# Patient Record
Sex: FEMALE | Race: BLACK OR AFRICAN AMERICAN | NOT HISPANIC OR LATINO
[De-identification: names, ages, dates, MRNs, and addresses within clinical notes are randomized per-mention and may not be internally consistent; named-entity substitution may affect disease eponyms.]

---

## 2022-06-20 PROBLEM — Z00.00 ENCOUNTER FOR PREVENTIVE HEALTH EXAMINATION: Status: ACTIVE | Noted: 2022-06-20

## 2022-06-21 PROBLEM — Z00.00 ENCOUNTER FOR PREVENTIVE HEALTH EXAMINATION: Status: ACTIVE | Noted: 2022-06-21

## 2022-07-07 ENCOUNTER — APPOINTMENT (OUTPATIENT)
Dept: VASCULAR SURGERY | Facility: CLINIC | Age: 77
End: 2022-07-07

## 2022-07-07 VITALS — HEART RATE: 84 BPM | DIASTOLIC BLOOD PRESSURE: 74 MMHG | SYSTOLIC BLOOD PRESSURE: 147 MMHG

## 2022-07-07 VITALS — HEART RATE: 84 BPM | DIASTOLIC BLOOD PRESSURE: 74 MMHG | TEMPERATURE: 97.3 F | SYSTOLIC BLOOD PRESSURE: 144 MMHG

## 2022-07-07 PROCEDURE — 99204 OFFICE O/P NEW MOD 45 MIN: CPT

## 2022-07-07 PROCEDURE — 93970 EXTREMITY STUDY: CPT

## 2022-07-15 ENCOUNTER — APPOINTMENT (OUTPATIENT)
Dept: WOUND CARE | Facility: CLINIC | Age: 77
End: 2022-07-15

## 2022-07-15 DIAGNOSIS — I89.0 LYMPHEDEMA, NOT ELSEWHERE CLASSIFIED: ICD-10-CM

## 2022-07-15 DIAGNOSIS — S80.821A BLISTER (NONTHERMAL), RIGHT LOWER LEG, INITIAL ENCOUNTER: ICD-10-CM

## 2022-07-15 DIAGNOSIS — M19.90 UNSPECIFIED OSTEOARTHRITIS, UNSPECIFIED SITE: ICD-10-CM

## 2022-07-15 DIAGNOSIS — Z78.9 OTHER SPECIFIED HEALTH STATUS: ICD-10-CM

## 2022-07-15 PROCEDURE — 99205 OFFICE O/P NEW HI 60 MIN: CPT

## 2022-07-15 RX ORDER — FUROSEMIDE 40 MG/1
40 TABLET ORAL
Refills: 0 | Status: ACTIVE | COMMUNITY

## 2022-07-15 RX ORDER — GABAPENTIN 100 MG/1
100 CAPSULE ORAL
Refills: 0 | Status: ACTIVE | COMMUNITY

## 2022-07-15 RX ORDER — ASPIRIN ENTERIC COATED TABLETS 81 MG 81 MG/1
81 TABLET, DELAYED RELEASE ORAL
Refills: 0 | Status: ACTIVE | COMMUNITY

## 2022-07-15 RX ORDER — BLOOD SUGAR DIAGNOSTIC
STRIP MISCELLANEOUS
Qty: 200 | Refills: 0 | Status: ACTIVE | COMMUNITY
Start: 2022-06-14

## 2022-07-15 RX ORDER — ATORVASTATIN CALCIUM 10 MG/1
10 TABLET, FILM COATED ORAL
Refills: 0 | Status: ACTIVE | COMMUNITY

## 2022-07-15 RX ORDER — AMLODIPINE BESYLATE 5 MG/1
5 TABLET ORAL
Refills: 0 | Status: ACTIVE | COMMUNITY

## 2022-07-15 RX ORDER — IRBESARTAN 300 MG/1
TABLET ORAL
Refills: 0 | Status: ACTIVE | COMMUNITY

## 2022-07-15 RX ORDER — NEBIVOLOL HYDROCHLORIDE 10 MG/1
10 TABLET ORAL
Refills: 0 | Status: ACTIVE | COMMUNITY

## 2022-07-15 RX ORDER — ALBUTEROL SULFATE 90 UG/1
108 (90 BASE) INHALANT RESPIRATORY (INHALATION)
Qty: 7 | Refills: 0 | Status: ACTIVE | COMMUNITY
Start: 2022-01-29

## 2022-07-15 RX ORDER — SEMAGLUTIDE 2.68 MG/ML
8 INJECTION, SOLUTION SUBCUTANEOUS
Qty: 9 | Refills: 0 | Status: ACTIVE | COMMUNITY
Start: 2022-06-14

## 2023-07-26 ENCOUNTER — APPOINTMENT (OUTPATIENT)
Dept: UROGYNECOLOGY | Facility: CLINIC | Age: 78
End: 2023-07-26
Payer: MEDICARE

## 2023-07-26 VITALS
WEIGHT: 238 LBS | BODY MASS INDEX: 40.63 KG/M2 | SYSTOLIC BLOOD PRESSURE: 150 MMHG | DIASTOLIC BLOOD PRESSURE: 75 MMHG | HEIGHT: 64 IN

## 2023-07-26 DIAGNOSIS — Z82.49 FAMILY HISTORY OF ISCHEMIC HEART DISEASE AND OTHER DISEASES OF THE CIRCULATORY SYSTEM: ICD-10-CM

## 2023-07-26 DIAGNOSIS — N39.41 URGE INCONTINENCE: ICD-10-CM

## 2023-07-26 DIAGNOSIS — Z86.79 PERSONAL HISTORY OF OTHER DISEASES OF THE CIRCULATORY SYSTEM: ICD-10-CM

## 2023-07-26 DIAGNOSIS — N36.41 HYPERMOBILITY OF URETHRA: ICD-10-CM

## 2023-07-26 DIAGNOSIS — Z86.39 PERSONAL HISTORY OF OTHER ENDOCRINE, NUTRITIONAL AND METABOLIC DISEASE: ICD-10-CM

## 2023-07-26 DIAGNOSIS — I38 ENDOCARDITIS, VALVE UNSPECIFIED: ICD-10-CM

## 2023-07-26 DIAGNOSIS — I51.9 HEART DISEASE, UNSPECIFIED: ICD-10-CM

## 2023-07-26 DIAGNOSIS — Z87.09 PERSONAL HISTORY OF OTHER DISEASES OF THE RESPIRATORY SYSTEM: ICD-10-CM

## 2023-07-26 DIAGNOSIS — Z82.5 FAMILY HISTORY OF ASTHMA AND OTHER CHRONIC LOWER RESPIRATORY DISEASES: ICD-10-CM

## 2023-07-26 DIAGNOSIS — Z83.3 FAMILY HISTORY OF DIABETES MELLITUS: ICD-10-CM

## 2023-07-26 DIAGNOSIS — Z83.42 FAMILY HISTORY OF FAMILIAL HYPERCHOLESTEROLEMIA: ICD-10-CM

## 2023-07-26 DIAGNOSIS — Z83.49 FAMILY HISTORY OF OTHER ENDOCRINE, NUTRITIONAL AND METABOLIC DISEASES: ICD-10-CM

## 2023-07-26 LAB
BILIRUB UR QL STRIP: NORMAL
CLARITY UR: CLEAR
COLLECTION METHOD: NORMAL
GLUCOSE UR-MCNC: 500
HCG UR QL: 0.2 EU/DL
HGB UR QL STRIP.AUTO: NORMAL
KETONES UR-MCNC: NORMAL
LEUKOCYTE ESTERASE UR QL STRIP: NORMAL
NITRITE UR QL STRIP: NORMAL
PH UR STRIP: 5.5
PROT UR STRIP-MCNC: 100
SP GR UR STRIP: 1.01

## 2023-07-26 PROCEDURE — 81003 URINALYSIS AUTO W/O SCOPE: CPT | Mod: QW

## 2023-07-26 PROCEDURE — 99205 OFFICE O/P NEW HI 60 MIN: CPT | Mod: 25

## 2023-07-26 PROCEDURE — 51701 INSERT BLADDER CATHETER: CPT

## 2023-07-26 NOTE — REASON FOR VISIT
[Initial Visit ___] : an initial visit for [unfilled] [Questionnaire Received] : Patient questionnaire received [Intake Form Reviewed] : Patient intake form with past medical history, surgical history, family history and social history reviewed today [Declined  Services] : Patient was offered free  services in ~his/her~ preferred language and declined services. Patient insisted on family member providing interpretation   [Source: ______] : History obtained from [unfilled]

## 2023-07-27 PROBLEM — Z83.3 FAMILY HISTORY OF DIABETES MELLITUS: Status: ACTIVE | Noted: 2023-07-27

## 2023-07-27 PROBLEM — Z82.49 FAMILY HISTORY OF HYPERTENSION: Status: ACTIVE | Noted: 2023-07-27

## 2023-07-27 PROBLEM — Z86.79 HISTORY OF HYPERTENSION: Status: RESOLVED | Noted: 2022-07-15 | Resolved: 2023-07-27

## 2023-07-27 PROBLEM — Z83.42 FAMILY HISTORY OF HYPERCHOLESTEROLEMIA: Status: ACTIVE | Noted: 2023-07-27

## 2023-07-27 PROBLEM — I38 LEAKY HEART VALVE: Status: RESOLVED | Noted: 2023-07-27 | Resolved: 2023-07-27

## 2023-07-27 PROBLEM — Z82.5 FAMILY HISTORY OF ASTHMA: Status: ACTIVE | Noted: 2023-07-27

## 2023-07-27 PROBLEM — Z87.09 HISTORY OF ASTHMA: Status: RESOLVED | Noted: 2022-07-15 | Resolved: 2023-07-27

## 2023-07-27 PROBLEM — Z86.39 HISTORY OF DIABETES MELLITUS: Status: RESOLVED | Noted: 2022-07-15 | Resolved: 2023-07-27

## 2023-07-27 PROBLEM — Z83.49 FAMILY HISTORY OF OBESITY: Status: ACTIVE | Noted: 2023-07-27

## 2023-07-27 PROBLEM — I51.9 HEART PROBLEM: Status: RESOLVED | Noted: 2023-07-27 | Resolved: 2023-07-27

## 2023-07-27 PROBLEM — Z86.39 HISTORY OF HYPERCHOLESTEROLEMIA: Status: RESOLVED | Noted: 2023-07-27 | Resolved: 2023-07-27

## 2023-07-27 RX ORDER — MONTELUKAST 10 MG/1
10 TABLET, FILM COATED ORAL
Refills: 0 | Status: ACTIVE | COMMUNITY

## 2023-07-27 RX ORDER — DAPAGLIFLOZIN 10 MG/1
10 TABLET, FILM COATED ORAL
Refills: 0 | Status: ACTIVE | COMMUNITY

## 2023-07-27 RX ORDER — INSULIN GLARGINE 100 [IU]/ML
INJECTION, SOLUTION SUBCUTANEOUS
Refills: 0 | Status: ACTIVE | COMMUNITY

## 2023-07-27 RX ORDER — PANTOPRAZOLE 40 MG/1
40 TABLET, DELAYED RELEASE ORAL
Refills: 0 | Status: ACTIVE | COMMUNITY

## 2023-07-27 RX ORDER — FLUTICASONE PROPIONATE AND SALMETEROL 50; 250 UG/1; UG/1
250-50 POWDER RESPIRATORY (INHALATION)
Refills: 0 | Status: ACTIVE | COMMUNITY

## 2023-07-28 NOTE — PROCEDURE
[Good Fit] : fits well [Erosion] : evidence of erosion [Pessary Inserted] : inserted [None] : no bleeding [Refit] : refit is not needed [Erythema] : no erythema [Discharge] : no vaginal discharge [Infection] : no evidence of infection [de-identified] : see above [FreeTextEntry1] : A catheterized urine was performed to rule out urinary tract infection and/or retention

## 2023-07-28 NOTE — COUNSELING
[FreeTextEntry1] : I reviewed the above findings with the patient with visual illustrations. Treatment options for the prolapse were discussed and included doing nothing, Kegel exercises and behavioral modification, a pessary, or surgical correction.  Is interested in surgical correction and not sexually active and is interested in vaginal closure.  We discussed due to the thickened extent of the prolapse today of placing a pessary which she was in agreement with.

## 2023-07-28 NOTE — PHYSICAL EXAM
[Chaperone Present] : A chaperone was present in the examining room during all aspects of the physical examination [No Acute Distress] : in no acute distress [Well developed] : well developed [Well Nourished] : ~L well nourished [Warm and Dry] : was warm and dry to touch [Normal] : normal external genitalia [Vulvar Atrophy] : vulvar atrophy [Normal Appearance] : general appearance was normal [Atrophy] : atrophy [Absent] : absent [Normal rectal exam] : was normal [Rectocele] : a rectocele [Cystocele] : a cystocele [Aa ____] : Aa [unfilled] [Ba ____] : Ba [unfilled] [C ____] : C [unfilled] [GH ____] : GH [unfilled] [Ap ____] : Ap [unfilled] [Bp ____] : Bp [unfilled] [Post Void Residual ____ml] : post void residual was [unfilled] ml [Tenderness] : ~T no ~M abdominal tenderness observed [Distended] : not distended [FreeTextEntry3] : + hypermobility [FreeTextEntry4] : + vaginal vault prolapse, + 2 small ulcerations

## 2023-07-28 NOTE — HISTORY OF PRESENT ILLNESS
[Uterine Prolapse] : no [] : years ago [Unable To Restrain Bowel Movement] : no [Urinary Frequency] : no [Urinary Tract Infection] : no [FreeTextEntry4] : one episode in April 2023 [FreeTextEntry5] : daily  [de-identified] : 2 [de-identified] : wears a diaper at night  [de-identified] : sometimes [de-identified] : sometimes  [de-identified] : voids in diapper  [de-identified] : wears a diapper  [de-identified] : not sexually active [FreeTextEntry1] : pt wears a diapper at night\par pt tried a doughnut in the past

## 2023-07-28 NOTE — DISCUSSION/SUMMARY
[FreeTextEntry1] : Pessary fitted as above.   We discussed that she i is at increased risk for morbidity and possibly mortality with surgical correction due to her medical history.  After further discussion she would like to try a pessary and will continue with it. \par We discussed the possibility of vaginal discharge developing, the pessary falling out, urinary incontinence developing/worsening, and the possibility of vaginal bleeding. I've asked her to call the office if any of the above happens.\par We discussed that urine dipstick showed glucosuria and protein and have asked her to follow-up with her medical doctor for such\par f/u in 2 weeks. \par All questions answered. \par IUGA patient info on pelvic organ prolapse, pessary, and overactive bladder was given to her.

## 2023-08-09 ENCOUNTER — APPOINTMENT (OUTPATIENT)
Dept: UROGYNECOLOGY | Facility: CLINIC | Age: 78
End: 2023-08-09

## 2023-08-16 ENCOUNTER — APPOINTMENT (OUTPATIENT)
Dept: UROGYNECOLOGY | Facility: CLINIC | Age: 78
End: 2023-08-16
Payer: MEDICARE

## 2023-08-16 DIAGNOSIS — N99.3 PROLAPSE OF VAGINAL VAULT AFTER HYSTERECTOMY: ICD-10-CM

## 2023-08-16 PROCEDURE — A4562: CPT

## 2023-08-16 PROCEDURE — 99214 OFFICE O/P EST MOD 30 MIN: CPT

## 2023-08-17 NOTE — HISTORY OF PRESENT ILLNESS
[FreeTextEntry1] : Krystina is a 76 y/o who presents for follow up for pelvic prolapse. She was fitted with a GH LS 3 1/4 at her last visit. Her sister reports that it fell out 3 days later. She would like to be refitted today.

## 2023-08-17 NOTE — PROCEDURE
[Refit] : refit needed [Pessary Inserted] : inserted [None] : no bleeding [Medication Review] : Medicaiton Review: Patient verbalizes understanding of risks and benefits [Bowel Management] : Bowel Management: patient verbalizes understanding of daily dietary fiber intake [Good Fit] : fit is not good [Erosion] : no evidence of erosion [Erythema] : no erythema [Discharge] : no vaginal discharge [Infection] : no evidence of infection [FreeTextEntry1] : GH LS 3 1/4 [FreeTextEntry8] : REviewed pericare

## 2023-08-17 NOTE — DISCUSSION/SUMMARY
[FreeTextEntry1] : Pelvic Prolapse: -refitted with GHG LS 3 1/2 -RTO in 2 weeks or sooner for recheck -Discussed the possibility of urinary incontinence developing/worsening. Patient knows to contact the office with any urinary issues. -Discussed the possibility of the pessary falling out. Importance of avoiding constipation reinforced. -We discussed the normal occurrence of vaginal discharge and minimal vaginal bleeding after placement. Bleeding precautions reviewed. She will contact the office with any questions or concerns. She is aware there is a covering MD overnight and during the weekend. Patient verbalizes understanding.

## 2023-08-17 NOTE — PHYSICAL EXAM
[No Acute Distress] : in no acute distress [Well developed] : well developed [Well Nourished] : ~L well nourished [Good Hygeine] : demonstrates good hygeine [Oriented x3] : oriented to person, place, and time [Normal Memory] : ~T memory was ~L unimpaired [Normal Mood/Affect] : mood and affect are normal [Vulvar Atrophy] : vulvar atrophy [Labia Majora] : were normal [Normal] : was normal [Atrophy] : atrophy [Rectocele] : a rectocele [Cystocele] : a cystocele [Uterine Prolapse] : uterine prolapse [No Bleeding] : there was no active vaginal bleeding [Anxiety] : patient is not anxious [de-identified] : ambulates with walker

## 2023-08-30 ENCOUNTER — APPOINTMENT (OUTPATIENT)
Dept: UROGYNECOLOGY | Facility: CLINIC | Age: 78
End: 2023-08-30
Payer: MEDICARE

## 2023-08-30 DIAGNOSIS — N81.6 RECTOCELE: ICD-10-CM

## 2023-08-30 DIAGNOSIS — N81.11 CYSTOCELE, MIDLINE: ICD-10-CM

## 2023-08-30 PROCEDURE — 99213 OFFICE O/P EST LOW 20 MIN: CPT

## 2023-08-30 NOTE — DISCUSSION/SUMMARY
[FreeTextEntry1] : Pelvic Prolapse: -Pessary left out for pelvic rest -Precautions and instructions reviewed -Advised patient to call the office with any questions or concerns -Will f/u in 3 weeks or sooner

## 2023-08-30 NOTE — PHYSICAL EXAM
[No Acute Distress] : in no acute distress [Well developed] : well developed [Well Nourished] : ~L well nourished [Good Hygeine] : demonstrates good hygeine [Oriented x3] : oriented to person, place, and time [Normal Memory] : ~T memory was ~L unimpaired [Normal Mood/Affect] : mood and affect are normal [Anxiety] : patient is not anxious [Vulvar Atrophy] : vulvar atrophy [Labia Majora] : were normal [Normal] : was normal [Atrophy] : atrophy [Rectocele] : a rectocele [Cystocele] : a cystocele [Uterine Prolapse] : uterine prolapse [No Bleeding] : there was no active vaginal bleeding [de-identified] : ambulates with walker [FreeTextEntry4] :  The pessary has mal rotated and the stem of pessary is facing towards the posterior wall of vagina.  There is no rectovaginal fistula on examination. Indentation on posterior vaginal wall.

## 2023-08-30 NOTE — HISTORY OF PRESENT ILLNESS
[FreeTextEntry1] : Krystina is a 78 y/o who presents for follow up for pelvic prolapse. She was fitted with a GH LS 3 1/2 at her last visit. Prior to that she had a GH LS 3 1/4 that fell out. She denies any vaginal bleeding, pelvic pain or discomfort. She denies any issues with urination or moving her bowels.

## 2023-08-30 NOTE — PROCEDURE
[Good Fit] : fit is not good [Refit] : refit is not needed [Erosion] : no evidence of erosion [Erythema] : erythema noted [Discharge] : no vaginal discharge [Infection] : no evidence of infection [Pessary Out] : removed [None] : no bleeding [Medication Review] : Medicaiton Review: Patient verbalizes understanding of risks and benefits [Bowel Management] : Bowel Management: patient verbalizes understanding of daily dietary fiber intake [FreeTextEntry1] : GH LS 3 1/4 [de-identified] : posterior vaginal wall [FreeTextEntry8] : REviewed pericare

## 2023-11-01 ENCOUNTER — APPOINTMENT (OUTPATIENT)
Dept: UROGYNECOLOGY | Facility: CLINIC | Age: 78
End: 2023-11-01

## 2023-11-23 ENCOUNTER — INPATIENT (INPATIENT)
Facility: HOSPITAL | Age: 78
LOS: 11 days | Discharge: NURSING, REHAB OR LONG-TERM CARE | End: 2023-12-05
Attending: INTERNAL MEDICINE | Admitting: INTERNAL MEDICINE
Payer: MEDICARE

## 2023-11-23 VITALS
HEART RATE: 80 BPM | TEMPERATURE: 97 F | DIASTOLIC BLOOD PRESSURE: 114 MMHG | OXYGEN SATURATION: 97 % | SYSTOLIC BLOOD PRESSURE: 154 MMHG | RESPIRATION RATE: 18 BRPM

## 2023-11-23 LAB
A1C WITH ESTIMATED AVERAGE GLUCOSE RESULT: 8.3 % — HIGH (ref 4–5.6)
A1C WITH ESTIMATED AVERAGE GLUCOSE RESULT: 8.3 % — HIGH (ref 4–5.6)
ALBUMIN SERPL ELPH-MCNC: 4.6 G/DL — SIGNIFICANT CHANGE UP (ref 3.3–5)
ALBUMIN SERPL ELPH-MCNC: 4.6 G/DL — SIGNIFICANT CHANGE UP (ref 3.3–5)
ALP SERPL-CCNC: 137 U/L — HIGH (ref 40–120)
ALP SERPL-CCNC: 137 U/L — HIGH (ref 40–120)
ALT FLD-CCNC: 17 U/L — SIGNIFICANT CHANGE UP (ref 4–33)
ALT FLD-CCNC: 17 U/L — SIGNIFICANT CHANGE UP (ref 4–33)
ANION GAP SERPL CALC-SCNC: 12 MMOL/L — SIGNIFICANT CHANGE UP (ref 7–14)
ANION GAP SERPL CALC-SCNC: 12 MMOL/L — SIGNIFICANT CHANGE UP (ref 7–14)
ANION GAP SERPL CALC-SCNC: 17 MMOL/L — HIGH (ref 7–14)
ANION GAP SERPL CALC-SCNC: 17 MMOL/L — HIGH (ref 7–14)
AST SERPL-CCNC: 26 U/L — SIGNIFICANT CHANGE UP (ref 4–32)
AST SERPL-CCNC: 26 U/L — SIGNIFICANT CHANGE UP (ref 4–32)
B-OH-BUTYR SERPL-SCNC: 0.6 MMOL/L — HIGH (ref 0–0.4)
B-OH-BUTYR SERPL-SCNC: 0.6 MMOL/L — HIGH (ref 0–0.4)
BASE EXCESS BLDV CALC-SCNC: -1 MMOL/L — SIGNIFICANT CHANGE UP (ref -2–3)
BASE EXCESS BLDV CALC-SCNC: -1 MMOL/L — SIGNIFICANT CHANGE UP (ref -2–3)
BASE EXCESS BLDV CALC-SCNC: -3.7 MMOL/L — LOW (ref -2–3)
BASE EXCESS BLDV CALC-SCNC: -3.7 MMOL/L — LOW (ref -2–3)
BASOPHILS # BLD AUTO: 0.01 K/UL — SIGNIFICANT CHANGE UP (ref 0–0.2)
BASOPHILS # BLD AUTO: 0.01 K/UL — SIGNIFICANT CHANGE UP (ref 0–0.2)
BASOPHILS NFR BLD AUTO: 0.1 % — SIGNIFICANT CHANGE UP (ref 0–2)
BASOPHILS NFR BLD AUTO: 0.1 % — SIGNIFICANT CHANGE UP (ref 0–2)
BILIRUB SERPL-MCNC: 0.4 MG/DL — SIGNIFICANT CHANGE UP (ref 0.2–1.2)
BILIRUB SERPL-MCNC: 0.4 MG/DL — SIGNIFICANT CHANGE UP (ref 0.2–1.2)
BLOOD GAS VENOUS COMPREHENSIVE RESULT: SIGNIFICANT CHANGE UP
BUN SERPL-MCNC: 38 MG/DL — HIGH (ref 7–23)
BUN SERPL-MCNC: 38 MG/DL — HIGH (ref 7–23)
BUN SERPL-MCNC: 46 MG/DL — HIGH (ref 7–23)
BUN SERPL-MCNC: 46 MG/DL — HIGH (ref 7–23)
CALCIUM SERPL-MCNC: 10.4 MG/DL — SIGNIFICANT CHANGE UP (ref 8.4–10.5)
CALCIUM SERPL-MCNC: 10.4 MG/DL — SIGNIFICANT CHANGE UP (ref 8.4–10.5)
CALCIUM SERPL-MCNC: 8.1 MG/DL — LOW (ref 8.4–10.5)
CALCIUM SERPL-MCNC: 8.1 MG/DL — LOW (ref 8.4–10.5)
CHLORIDE BLDV-SCNC: 103 MMOL/L — SIGNIFICANT CHANGE UP (ref 96–108)
CHLORIDE BLDV-SCNC: 103 MMOL/L — SIGNIFICANT CHANGE UP (ref 96–108)
CHLORIDE BLDV-SCNC: 99 MMOL/L — SIGNIFICANT CHANGE UP (ref 96–108)
CHLORIDE BLDV-SCNC: 99 MMOL/L — SIGNIFICANT CHANGE UP (ref 96–108)
CHLORIDE SERPL-SCNC: 106 MMOL/L — SIGNIFICANT CHANGE UP (ref 98–107)
CHLORIDE SERPL-SCNC: 106 MMOL/L — SIGNIFICANT CHANGE UP (ref 98–107)
CHLORIDE SERPL-SCNC: 95 MMOL/L — LOW (ref 98–107)
CHLORIDE SERPL-SCNC: 95 MMOL/L — LOW (ref 98–107)
CO2 BLDV-SCNC: 26.1 MMOL/L — HIGH (ref 22–26)
CO2 BLDV-SCNC: 26.1 MMOL/L — HIGH (ref 22–26)
CO2 BLDV-SCNC: 28.5 MMOL/L — HIGH (ref 22–26)
CO2 BLDV-SCNC: 28.5 MMOL/L — HIGH (ref 22–26)
CO2 SERPL-SCNC: 17 MMOL/L — LOW (ref 22–31)
CO2 SERPL-SCNC: 17 MMOL/L — LOW (ref 22–31)
CO2 SERPL-SCNC: 21 MMOL/L — LOW (ref 22–31)
CO2 SERPL-SCNC: 21 MMOL/L — LOW (ref 22–31)
CREAT SERPL-MCNC: 0.9 MG/DL — SIGNIFICANT CHANGE UP (ref 0.5–1.3)
CREAT SERPL-MCNC: 0.9 MG/DL — SIGNIFICANT CHANGE UP (ref 0.5–1.3)
CREAT SERPL-MCNC: 1.18 MG/DL — SIGNIFICANT CHANGE UP (ref 0.5–1.3)
CREAT SERPL-MCNC: 1.18 MG/DL — SIGNIFICANT CHANGE UP (ref 0.5–1.3)
EGFR: 48 ML/MIN/1.73M2 — LOW
EGFR: 48 ML/MIN/1.73M2 — LOW
EGFR: 66 ML/MIN/1.73M2 — SIGNIFICANT CHANGE UP
EGFR: 66 ML/MIN/1.73M2 — SIGNIFICANT CHANGE UP
EOSINOPHIL # BLD AUTO: 0.01 K/UL — SIGNIFICANT CHANGE UP (ref 0–0.5)
EOSINOPHIL # BLD AUTO: 0.01 K/UL — SIGNIFICANT CHANGE UP (ref 0–0.5)
EOSINOPHIL NFR BLD AUTO: 0.1 % — SIGNIFICANT CHANGE UP (ref 0–6)
EOSINOPHIL NFR BLD AUTO: 0.1 % — SIGNIFICANT CHANGE UP (ref 0–6)
ESTIMATED AVERAGE GLUCOSE: 192 — SIGNIFICANT CHANGE UP
ESTIMATED AVERAGE GLUCOSE: 192 — SIGNIFICANT CHANGE UP
GAS PNL BLDV: 125 MMOL/L — LOW (ref 136–145)
GAS PNL BLDV: 125 MMOL/L — LOW (ref 136–145)
GAS PNL BLDV: 132 MMOL/L — LOW (ref 136–145)
GAS PNL BLDV: 132 MMOL/L — LOW (ref 136–145)
GLUCOSE BLDV-MCNC: 318 MG/DL — HIGH (ref 70–99)
GLUCOSE BLDV-MCNC: 318 MG/DL — HIGH (ref 70–99)
GLUCOSE BLDV-MCNC: 447 MG/DL — HIGH (ref 70–99)
GLUCOSE BLDV-MCNC: 447 MG/DL — HIGH (ref 70–99)
GLUCOSE SERPL-MCNC: 280 MG/DL — HIGH (ref 70–99)
GLUCOSE SERPL-MCNC: 280 MG/DL — HIGH (ref 70–99)
GLUCOSE SERPL-MCNC: 392 MG/DL — HIGH (ref 70–99)
GLUCOSE SERPL-MCNC: 392 MG/DL — HIGH (ref 70–99)
HCO3 BLDV-SCNC: 24 MMOL/L — SIGNIFICANT CHANGE UP (ref 22–29)
HCO3 BLDV-SCNC: 24 MMOL/L — SIGNIFICANT CHANGE UP (ref 22–29)
HCO3 BLDV-SCNC: 27 MMOL/L — SIGNIFICANT CHANGE UP (ref 22–29)
HCO3 BLDV-SCNC: 27 MMOL/L — SIGNIFICANT CHANGE UP (ref 22–29)
HCT VFR BLD CALC: 40.2 % — SIGNIFICANT CHANGE UP (ref 34.5–45)
HCT VFR BLD CALC: 40.2 % — SIGNIFICANT CHANGE UP (ref 34.5–45)
HCT VFR BLDA CALC: 38 % — SIGNIFICANT CHANGE UP (ref 34.5–46.5)
HCT VFR BLDA CALC: 38 % — SIGNIFICANT CHANGE UP (ref 34.5–46.5)
HCT VFR BLDA CALC: 41 % — SIGNIFICANT CHANGE UP (ref 34.5–46.5)
HCT VFR BLDA CALC: 41 % — SIGNIFICANT CHANGE UP (ref 34.5–46.5)
HGB BLD CALC-MCNC: 12.7 G/DL — SIGNIFICANT CHANGE UP (ref 11.7–16.1)
HGB BLD CALC-MCNC: 12.7 G/DL — SIGNIFICANT CHANGE UP (ref 11.7–16.1)
HGB BLD CALC-MCNC: 13.6 G/DL — SIGNIFICANT CHANGE UP (ref 11.7–16.1)
HGB BLD CALC-MCNC: 13.6 G/DL — SIGNIFICANT CHANGE UP (ref 11.7–16.1)
HGB BLD-MCNC: 13 G/DL — SIGNIFICANT CHANGE UP (ref 11.5–15.5)
HGB BLD-MCNC: 13 G/DL — SIGNIFICANT CHANGE UP (ref 11.5–15.5)
IANC: 7.06 K/UL — SIGNIFICANT CHANGE UP (ref 1.8–7.4)
IANC: 7.06 K/UL — SIGNIFICANT CHANGE UP (ref 1.8–7.4)
IMM GRANULOCYTES NFR BLD AUTO: 0.4 % — SIGNIFICANT CHANGE UP (ref 0–0.9)
IMM GRANULOCYTES NFR BLD AUTO: 0.4 % — SIGNIFICANT CHANGE UP (ref 0–0.9)
LACTATE BLDV-MCNC: 1.5 MMOL/L — SIGNIFICANT CHANGE UP (ref 0.5–2)
LACTATE BLDV-MCNC: 1.5 MMOL/L — SIGNIFICANT CHANGE UP (ref 0.5–2)
LACTATE BLDV-MCNC: 1.8 MMOL/L — SIGNIFICANT CHANGE UP (ref 0.5–2)
LACTATE BLDV-MCNC: 1.8 MMOL/L — SIGNIFICANT CHANGE UP (ref 0.5–2)
LIDOCAIN IGE QN: 34 U/L — SIGNIFICANT CHANGE UP (ref 7–60)
LIDOCAIN IGE QN: 34 U/L — SIGNIFICANT CHANGE UP (ref 7–60)
LYMPHOCYTES # BLD AUTO: 0.65 K/UL — LOW (ref 1–3.3)
LYMPHOCYTES # BLD AUTO: 0.65 K/UL — LOW (ref 1–3.3)
LYMPHOCYTES # BLD AUTO: 8.1 % — LOW (ref 13–44)
LYMPHOCYTES # BLD AUTO: 8.1 % — LOW (ref 13–44)
MAGNESIUM SERPL-MCNC: 2 MG/DL — SIGNIFICANT CHANGE UP (ref 1.6–2.6)
MAGNESIUM SERPL-MCNC: 2 MG/DL — SIGNIFICANT CHANGE UP (ref 1.6–2.6)
MAGNESIUM SERPL-MCNC: 2.5 MG/DL — SIGNIFICANT CHANGE UP (ref 1.6–2.6)
MAGNESIUM SERPL-MCNC: 2.5 MG/DL — SIGNIFICANT CHANGE UP (ref 1.6–2.6)
MCHC RBC-ENTMCNC: 29.4 PG — SIGNIFICANT CHANGE UP (ref 27–34)
MCHC RBC-ENTMCNC: 29.4 PG — SIGNIFICANT CHANGE UP (ref 27–34)
MCHC RBC-ENTMCNC: 32.3 GM/DL — SIGNIFICANT CHANGE UP (ref 32–36)
MCHC RBC-ENTMCNC: 32.3 GM/DL — SIGNIFICANT CHANGE UP (ref 32–36)
MCV RBC AUTO: 91 FL — SIGNIFICANT CHANGE UP (ref 80–100)
MCV RBC AUTO: 91 FL — SIGNIFICANT CHANGE UP (ref 80–100)
MONOCYTES # BLD AUTO: 0.22 K/UL — SIGNIFICANT CHANGE UP (ref 0–0.9)
MONOCYTES # BLD AUTO: 0.22 K/UL — SIGNIFICANT CHANGE UP (ref 0–0.9)
MONOCYTES NFR BLD AUTO: 2.8 % — SIGNIFICANT CHANGE UP (ref 2–14)
MONOCYTES NFR BLD AUTO: 2.8 % — SIGNIFICANT CHANGE UP (ref 2–14)
NEUTROPHILS # BLD AUTO: 7.06 K/UL — SIGNIFICANT CHANGE UP (ref 1.8–7.4)
NEUTROPHILS # BLD AUTO: 7.06 K/UL — SIGNIFICANT CHANGE UP (ref 1.8–7.4)
NEUTROPHILS NFR BLD AUTO: 88.5 % — HIGH (ref 43–77)
NEUTROPHILS NFR BLD AUTO: 88.5 % — HIGH (ref 43–77)
NRBC # BLD: 0 /100 WBCS — SIGNIFICANT CHANGE UP (ref 0–0)
NRBC # BLD: 0 /100 WBCS — SIGNIFICANT CHANGE UP (ref 0–0)
NRBC # FLD: 0 K/UL — SIGNIFICANT CHANGE UP (ref 0–0)
NRBC # FLD: 0 K/UL — SIGNIFICANT CHANGE UP (ref 0–0)
PCO2 BLDV: 57 MMHG — HIGH (ref 39–52)
PH BLDV: 7.24 — LOW (ref 7.32–7.43)
PH BLDV: 7.24 — LOW (ref 7.32–7.43)
PH BLDV: 7.28 — LOW (ref 7.32–7.43)
PH BLDV: 7.28 — LOW (ref 7.32–7.43)
PHOSPHATE SERPL-MCNC: 3.6 MG/DL — SIGNIFICANT CHANGE UP (ref 2.5–4.5)
PHOSPHATE SERPL-MCNC: 3.6 MG/DL — SIGNIFICANT CHANGE UP (ref 2.5–4.5)
PHOSPHATE SERPL-MCNC: 4.4 MG/DL — SIGNIFICANT CHANGE UP (ref 2.5–4.5)
PHOSPHATE SERPL-MCNC: 4.4 MG/DL — SIGNIFICANT CHANGE UP (ref 2.5–4.5)
PLATELET # BLD AUTO: 237 K/UL — SIGNIFICANT CHANGE UP (ref 150–400)
PLATELET # BLD AUTO: 237 K/UL — SIGNIFICANT CHANGE UP (ref 150–400)
PO2 BLDV: 28 MMHG — SIGNIFICANT CHANGE UP (ref 25–45)
PO2 BLDV: 28 MMHG — SIGNIFICANT CHANGE UP (ref 25–45)
PO2 BLDV: 34 MMHG — SIGNIFICANT CHANGE UP (ref 25–45)
PO2 BLDV: 34 MMHG — SIGNIFICANT CHANGE UP (ref 25–45)
POTASSIUM BLDV-SCNC: 4.9 MMOL/L — SIGNIFICANT CHANGE UP (ref 3.5–5.1)
POTASSIUM BLDV-SCNC: 4.9 MMOL/L — SIGNIFICANT CHANGE UP (ref 3.5–5.1)
POTASSIUM BLDV-SCNC: SIGNIFICANT CHANGE UP MMOL/L (ref 3.5–5.1)
POTASSIUM BLDV-SCNC: SIGNIFICANT CHANGE UP MMOL/L (ref 3.5–5.1)
POTASSIUM SERPL-MCNC: 5 MMOL/L — SIGNIFICANT CHANGE UP (ref 3.5–5.3)
POTASSIUM SERPL-MCNC: 5 MMOL/L — SIGNIFICANT CHANGE UP (ref 3.5–5.3)
POTASSIUM SERPL-MCNC: 5.6 MMOL/L — HIGH (ref 3.5–5.3)
POTASSIUM SERPL-MCNC: 5.6 MMOL/L — HIGH (ref 3.5–5.3)
POTASSIUM SERPL-SCNC: 5 MMOL/L — SIGNIFICANT CHANGE UP (ref 3.5–5.3)
POTASSIUM SERPL-SCNC: 5 MMOL/L — SIGNIFICANT CHANGE UP (ref 3.5–5.3)
POTASSIUM SERPL-SCNC: 5.6 MMOL/L — HIGH (ref 3.5–5.3)
POTASSIUM SERPL-SCNC: 5.6 MMOL/L — HIGH (ref 3.5–5.3)
PROT SERPL-MCNC: 9.2 G/DL — HIGH (ref 6–8.3)
PROT SERPL-MCNC: 9.2 G/DL — HIGH (ref 6–8.3)
RBC # BLD: 4.42 M/UL — SIGNIFICANT CHANGE UP (ref 3.8–5.2)
RBC # BLD: 4.42 M/UL — SIGNIFICANT CHANGE UP (ref 3.8–5.2)
RBC # FLD: 12.1 % — SIGNIFICANT CHANGE UP (ref 10.3–14.5)
RBC # FLD: 12.1 % — SIGNIFICANT CHANGE UP (ref 10.3–14.5)
SAO2 % BLDV: 36.9 % — LOW (ref 67–88)
SAO2 % BLDV: 36.9 % — LOW (ref 67–88)
SAO2 % BLDV: 49.1 % — LOW (ref 67–88)
SAO2 % BLDV: 49.1 % — LOW (ref 67–88)
SODIUM SERPL-SCNC: 133 MMOL/L — LOW (ref 135–145)
SODIUM SERPL-SCNC: 133 MMOL/L — LOW (ref 135–145)
SODIUM SERPL-SCNC: 135 MMOL/L — SIGNIFICANT CHANGE UP (ref 135–145)
SODIUM SERPL-SCNC: 135 MMOL/L — SIGNIFICANT CHANGE UP (ref 135–145)
TROPONIN T, HIGH SENSITIVITY RESULT: 36 NG/L — SIGNIFICANT CHANGE UP
TROPONIN T, HIGH SENSITIVITY RESULT: 36 NG/L — SIGNIFICANT CHANGE UP
TROPONIN T, HIGH SENSITIVITY RESULT: 48 NG/L — SIGNIFICANT CHANGE UP
TROPONIN T, HIGH SENSITIVITY RESULT: 48 NG/L — SIGNIFICANT CHANGE UP
WBC # BLD: 7.98 K/UL — SIGNIFICANT CHANGE UP (ref 3.8–10.5)
WBC # BLD: 7.98 K/UL — SIGNIFICANT CHANGE UP (ref 3.8–10.5)
WBC # FLD AUTO: 7.98 K/UL — SIGNIFICANT CHANGE UP (ref 3.8–10.5)
WBC # FLD AUTO: 7.98 K/UL — SIGNIFICANT CHANGE UP (ref 3.8–10.5)

## 2023-11-23 PROCEDURE — 74177 CT ABD & PELVIS W/CONTRAST: CPT | Mod: 26,MA

## 2023-11-23 PROCEDURE — 76700 US EXAM ABDOM COMPLETE: CPT | Mod: 26

## 2023-11-23 PROCEDURE — 71045 X-RAY EXAM CHEST 1 VIEW: CPT | Mod: 26

## 2023-11-23 PROCEDURE — 99285 EMERGENCY DEPT VISIT HI MDM: CPT

## 2023-11-23 RX ORDER — ONDANSETRON 8 MG/1
4 TABLET, FILM COATED ORAL ONCE
Refills: 0 | Status: COMPLETED | OUTPATIENT
Start: 2023-11-23 | End: 2023-11-23

## 2023-11-23 RX ORDER — FUROSEMIDE 40 MG
10 TABLET ORAL ONCE
Refills: 0 | Status: DISCONTINUED | OUTPATIENT
Start: 2023-11-23 | End: 2023-11-23

## 2023-11-23 RX ORDER — ACETAMINOPHEN 500 MG
1000 TABLET ORAL ONCE
Refills: 0 | Status: COMPLETED | OUTPATIENT
Start: 2023-11-23 | End: 2023-11-23

## 2023-11-23 RX ORDER — SODIUM CHLORIDE 9 MG/ML
1000 INJECTION INTRAMUSCULAR; INTRAVENOUS; SUBCUTANEOUS ONCE
Refills: 0 | Status: COMPLETED | OUTPATIENT
Start: 2023-11-23 | End: 2023-11-23

## 2023-11-23 RX ORDER — INSULIN LISPRO 100/ML
5 VIAL (ML) SUBCUTANEOUS ONCE
Refills: 0 | Status: COMPLETED | OUTPATIENT
Start: 2023-11-23 | End: 2023-11-23

## 2023-11-23 RX ORDER — AMLODIPINE BESYLATE 2.5 MG/1
10 TABLET ORAL ONCE
Refills: 0 | Status: COMPLETED | OUTPATIENT
Start: 2023-11-23 | End: 2023-11-23

## 2023-11-23 RX ORDER — FAMOTIDINE 10 MG/ML
20 INJECTION INTRAVENOUS ONCE
Refills: 0 | Status: COMPLETED | OUTPATIENT
Start: 2023-11-23 | End: 2023-11-23

## 2023-11-23 RX ORDER — FUROSEMIDE 40 MG
20 TABLET ORAL ONCE
Refills: 0 | Status: COMPLETED | OUTPATIENT
Start: 2023-11-23 | End: 2023-11-23

## 2023-11-23 RX ADMIN — Medication 5 UNIT(S): at 17:30

## 2023-11-23 RX ADMIN — SODIUM CHLORIDE 1000 MILLILITER(S): 9 INJECTION INTRAMUSCULAR; INTRAVENOUS; SUBCUTANEOUS at 17:29

## 2023-11-23 RX ADMIN — FAMOTIDINE 20 MILLIGRAM(S): 10 INJECTION INTRAVENOUS at 16:16

## 2023-11-23 RX ADMIN — SODIUM CHLORIDE 1000 MILLILITER(S): 9 INJECTION INTRAMUSCULAR; INTRAVENOUS; SUBCUTANEOUS at 16:16

## 2023-11-23 RX ADMIN — AMLODIPINE BESYLATE 10 MILLIGRAM(S): 2.5 TABLET ORAL at 18:47

## 2023-11-23 RX ADMIN — Medication 20 MILLIGRAM(S): at 18:46

## 2023-11-23 RX ADMIN — Medication 400 MILLIGRAM(S): at 16:18

## 2023-11-23 RX ADMIN — ONDANSETRON 4 MILLIGRAM(S): 8 TABLET, FILM COATED ORAL at 16:14

## 2023-11-23 NOTE — ED ADULT NURSE NOTE - OBJECTIVE STATEMENT
Patient received in room 23. Patient A&Ox3 and ambulatory with walker and assistance at baseline. Patient brought in by EMS c/o abdominal pain and nausea for approximately 1 day. phx DM type 2, HTN, asthma. Patient states she has been experiencing generalized weakness, lethargy, chills, abdominal pain and nausea for approximately 1 day. Additionally, patient states she did not take her night time dose of lantus due to arthritis in her hands. Airway patent, chest rise equal bilaterally, respirations unlabored. Patient denies dyspnea, shortness of breath, and chest pain. Abdomen flat, soft and non-distended; upon palpation, patient endorses tenderness to right upper and lower quadrants. Patient denies changes in BMs/urine. Pulse/motor/sensory present and no edema noted to all four extremities. Skin warm/pink/dry; dark and rough skin noted to bilateral ankles. Labs collected and sent, medications administered as prescribed. Safety measures in place, call bell within reach and family at bedside.

## 2023-11-23 NOTE — ED PROVIDER NOTE - OBJECTIVE STATEMENT
78-year-old female with past medical history of hypertension, hyperlipidemia, diabetes on insulin (patient did not take her Lantus 60 units at bedtime yesterday because she was having difficulty using the injecting device) complaining of a 1 day history of lightheadedness, nausea, vomiting, localized right upper quadrant abdominal pain, and generalized weakness.  Patient is otherwise asymptomatic. Denies fevers, chills, dizziness, chest pain, SOB, dysuria, hematuria.

## 2023-11-23 NOTE — ED PROVIDER NOTE - CLINICAL SUMMARY MEDICAL DECISION MAKING FREE TEXT BOX
78-year-old female with past medical history of hypertension, hyperlipidemia, diabetes on insulin (patient did not take her Lantus 60 units at bedtime yesterday because she was having difficulty using the injecting device) complaining of a 1 day history of lightheadedness, nausea, vomiting, localized right upper quadrant abdominal pain, and generalized weakness.  Patient is otherwise asymptomatic. Denies fevers, chills, dizziness, chest pain, SOB, dysuria, hematuria. RUQ abd TTP, otherwise benign exam. Will screen for DKA, RUQ US for cholecystitis, lipase, and reassess.

## 2023-11-23 NOTE — ED PROVIDER NOTE - ATTENDING CONTRIBUTION TO CARE
78-year-old female past medical history hypertension, hyperlipidemia, diabetes (unsure what type, is on insulin) presents to ED for 1 day of right upper quadrant pain, nausea, vomiting, lightheadedness, fatigue.  Family at bedside note fingersticks at home have been elevated, in ED noted to be 343.  No fever, chills, chest pain, palpitations, shortness of breath, cough.  No diarrhea.  No urinary symptoms reported.    Exam as above    Will assess for acute intra-abdominal infection versus DKA, screen for ACS given hyperglycemia although overall lower suspicion.  Plan: Labs, chest x-ray, right upper quadrant ultrasound, IV fluids, symptom relief as needed, reassess.

## 2023-11-23 NOTE — ED PROVIDER NOTE - CARE PLAN
Principal Discharge DX:	Unspecified abdominal pain   1 Principal Discharge DX:	SBO (small bowel obstruction)

## 2023-11-23 NOTE — ED PROVIDER NOTE - PROGRESS NOTE DETAILS
Rodrigo [place]: Furosemide 10mg, amlodipine 10mg, nebivolol 10mg are the home BP medications. Will administer meds given 200s/100s BP. Walter [place] PGY3: CT showing SBO. evaluated by surgery who will admit.

## 2023-11-23 NOTE — ED ADULT TRIAGE NOTE - CHIEF COMPLAINT QUOTE
Pt. c/o nausea, vomiting, weakness, dizziness and right sided abdominal pain since this morning. Daughter states pt. was hyperglycemic and hypertensive. Hasn't taken her medications since yesterday. Phx: DM2, HTN, asthma

## 2023-11-23 NOTE — ED PROVIDER NOTE - PHYSICAL EXAMINATION
GENERAL: NAD  HEENT:  Atraumatic  CHEST/LUNG: Chest rise equal bilaterally  HEART: Regular rate and rhythm  ABDOMEN: Soft, RUQ abd TTP, Nondistended  EXTREMITIES:  Extremities warm  PSYCH: A&Ox3  SKIN: No obvious rashes or lesions  NEUROLOGY: strength and sensation intact in all extremities GENERAL: NAD  HEENT:  Atraumatic. PERRL, EOMI. MMM  CHEST/LUNG: Chest rise equal bilaterally. lungs clear to ausculation.   HEART: Regular rate and rhythm  ABDOMEN: Soft, mild RUQ abd TTP, Nondistended, no rebound or guarding.   MSK; Extremities warm, FROM. no midline or paraspinal tenderness.   PSYCH: A&Ox3  SKIN: No rash  NEUROLOGY: strength and sensation intact in all extremities

## 2023-11-24 DIAGNOSIS — Z98.891 HISTORY OF UTERINE SCAR FROM PREVIOUS SURGERY: Chronic | ICD-10-CM

## 2023-11-24 DIAGNOSIS — K56.609 UNSPECIFIED INTESTINAL OBSTRUCTION, UNSPECIFIED AS TO PARTIAL VERSUS COMPLETE OBSTRUCTION: ICD-10-CM

## 2023-11-24 DIAGNOSIS — Z90.710 ACQUIRED ABSENCE OF BOTH CERVIX AND UTERUS: Chronic | ICD-10-CM

## 2023-11-24 LAB
ANION GAP SERPL CALC-SCNC: 12 MMOL/L — SIGNIFICANT CHANGE UP (ref 7–14)
ANION GAP SERPL CALC-SCNC: 12 MMOL/L — SIGNIFICANT CHANGE UP (ref 7–14)
BUN SERPL-MCNC: 34 MG/DL — HIGH (ref 7–23)
BUN SERPL-MCNC: 34 MG/DL — HIGH (ref 7–23)
CALCIUM SERPL-MCNC: 9 MG/DL — SIGNIFICANT CHANGE UP (ref 8.4–10.5)
CALCIUM SERPL-MCNC: 9 MG/DL — SIGNIFICANT CHANGE UP (ref 8.4–10.5)
CHLORIDE SERPL-SCNC: 104 MMOL/L — SIGNIFICANT CHANGE UP (ref 98–107)
CHLORIDE SERPL-SCNC: 104 MMOL/L — SIGNIFICANT CHANGE UP (ref 98–107)
CO2 SERPL-SCNC: 25 MMOL/L — SIGNIFICANT CHANGE UP (ref 22–31)
CO2 SERPL-SCNC: 25 MMOL/L — SIGNIFICANT CHANGE UP (ref 22–31)
CREAT SERPL-MCNC: 1.14 MG/DL — SIGNIFICANT CHANGE UP (ref 0.5–1.3)
CREAT SERPL-MCNC: 1.14 MG/DL — SIGNIFICANT CHANGE UP (ref 0.5–1.3)
EGFR: 50 ML/MIN/1.73M2 — LOW
EGFR: 50 ML/MIN/1.73M2 — LOW
GLUCOSE BLDC GLUCOMTR-MCNC: 141 MG/DL — HIGH (ref 70–99)
GLUCOSE BLDC GLUCOMTR-MCNC: 141 MG/DL — HIGH (ref 70–99)
GLUCOSE BLDC GLUCOMTR-MCNC: 142 MG/DL — HIGH (ref 70–99)
GLUCOSE BLDC GLUCOMTR-MCNC: 142 MG/DL — HIGH (ref 70–99)
GLUCOSE BLDC GLUCOMTR-MCNC: 167 MG/DL — HIGH (ref 70–99)
GLUCOSE BLDC GLUCOMTR-MCNC: 167 MG/DL — HIGH (ref 70–99)
GLUCOSE BLDC GLUCOMTR-MCNC: 184 MG/DL — HIGH (ref 70–99)
GLUCOSE BLDC GLUCOMTR-MCNC: 184 MG/DL — HIGH (ref 70–99)
GLUCOSE SERPL-MCNC: 172 MG/DL — HIGH (ref 70–99)
GLUCOSE SERPL-MCNC: 172 MG/DL — HIGH (ref 70–99)
HCT VFR BLD CALC: 35.8 % — SIGNIFICANT CHANGE UP (ref 34.5–45)
HCT VFR BLD CALC: 35.8 % — SIGNIFICANT CHANGE UP (ref 34.5–45)
HCV AB S/CO SERPL IA: 0.09 S/CO — SIGNIFICANT CHANGE UP (ref 0–0.99)
HCV AB S/CO SERPL IA: 0.09 S/CO — SIGNIFICANT CHANGE UP (ref 0–0.99)
HCV AB SERPL-IMP: SIGNIFICANT CHANGE UP
HCV AB SERPL-IMP: SIGNIFICANT CHANGE UP
HGB BLD-MCNC: 11.6 G/DL — SIGNIFICANT CHANGE UP (ref 11.5–15.5)
HGB BLD-MCNC: 11.6 G/DL — SIGNIFICANT CHANGE UP (ref 11.5–15.5)
MAGNESIUM SERPL-MCNC: 2.1 MG/DL — SIGNIFICANT CHANGE UP (ref 1.6–2.6)
MAGNESIUM SERPL-MCNC: 2.1 MG/DL — SIGNIFICANT CHANGE UP (ref 1.6–2.6)
MCHC RBC-ENTMCNC: 30.1 PG — SIGNIFICANT CHANGE UP (ref 27–34)
MCHC RBC-ENTMCNC: 30.1 PG — SIGNIFICANT CHANGE UP (ref 27–34)
MCHC RBC-ENTMCNC: 32.4 GM/DL — SIGNIFICANT CHANGE UP (ref 32–36)
MCHC RBC-ENTMCNC: 32.4 GM/DL — SIGNIFICANT CHANGE UP (ref 32–36)
MCV RBC AUTO: 92.7 FL — SIGNIFICANT CHANGE UP (ref 80–100)
MCV RBC AUTO: 92.7 FL — SIGNIFICANT CHANGE UP (ref 80–100)
NRBC # BLD: 0 /100 WBCS — SIGNIFICANT CHANGE UP (ref 0–0)
NRBC # BLD: 0 /100 WBCS — SIGNIFICANT CHANGE UP (ref 0–0)
NRBC # FLD: 0 K/UL — SIGNIFICANT CHANGE UP (ref 0–0)
NRBC # FLD: 0 K/UL — SIGNIFICANT CHANGE UP (ref 0–0)
PHOSPHATE SERPL-MCNC: 3.3 MG/DL — SIGNIFICANT CHANGE UP (ref 2.5–4.5)
PHOSPHATE SERPL-MCNC: 3.3 MG/DL — SIGNIFICANT CHANGE UP (ref 2.5–4.5)
PLATELET # BLD AUTO: 200 K/UL — SIGNIFICANT CHANGE UP (ref 150–400)
PLATELET # BLD AUTO: 200 K/UL — SIGNIFICANT CHANGE UP (ref 150–400)
POTASSIUM SERPL-MCNC: 4.4 MMOL/L — SIGNIFICANT CHANGE UP (ref 3.5–5.3)
POTASSIUM SERPL-MCNC: 4.4 MMOL/L — SIGNIFICANT CHANGE UP (ref 3.5–5.3)
POTASSIUM SERPL-SCNC: 4.4 MMOL/L — SIGNIFICANT CHANGE UP (ref 3.5–5.3)
POTASSIUM SERPL-SCNC: 4.4 MMOL/L — SIGNIFICANT CHANGE UP (ref 3.5–5.3)
RBC # BLD: 3.86 M/UL — SIGNIFICANT CHANGE UP (ref 3.8–5.2)
RBC # BLD: 3.86 M/UL — SIGNIFICANT CHANGE UP (ref 3.8–5.2)
RBC # FLD: 12.6 % — SIGNIFICANT CHANGE UP (ref 10.3–14.5)
RBC # FLD: 12.6 % — SIGNIFICANT CHANGE UP (ref 10.3–14.5)
SODIUM SERPL-SCNC: 141 MMOL/L — SIGNIFICANT CHANGE UP (ref 135–145)
SODIUM SERPL-SCNC: 141 MMOL/L — SIGNIFICANT CHANGE UP (ref 135–145)
WBC # BLD: 7.05 K/UL — SIGNIFICANT CHANGE UP (ref 3.8–10.5)
WBC # BLD: 7.05 K/UL — SIGNIFICANT CHANGE UP (ref 3.8–10.5)
WBC # FLD AUTO: 7.05 K/UL — SIGNIFICANT CHANGE UP (ref 3.8–10.5)
WBC # FLD AUTO: 7.05 K/UL — SIGNIFICANT CHANGE UP (ref 3.8–10.5)

## 2023-11-24 PROCEDURE — 99232 SBSQ HOSP IP/OBS MODERATE 35: CPT | Mod: GC

## 2023-11-24 PROCEDURE — 71045 X-RAY EXAM CHEST 1 VIEW: CPT | Mod: 26,76

## 2023-11-24 RX ORDER — LOSARTAN POTASSIUM 100 MG/1
100 TABLET, FILM COATED ORAL DAILY
Refills: 0 | Status: DISCONTINUED | OUTPATIENT
Start: 2023-11-24 | End: 2023-11-26

## 2023-11-24 RX ORDER — GABAPENTIN 400 MG/1
100 CAPSULE ORAL
Refills: 0 | DISCHARGE

## 2023-11-24 RX ORDER — ACETAMINOPHEN 500 MG
975 TABLET ORAL EVERY 6 HOURS
Refills: 0 | Status: DISCONTINUED | OUTPATIENT
Start: 2023-11-24 | End: 2023-11-26

## 2023-11-24 RX ORDER — IRBESARTAN 75 MG/1
1 TABLET ORAL
Refills: 0 | DISCHARGE

## 2023-11-24 RX ORDER — DEXTROSE 50 % IN WATER 50 %
25 SYRINGE (ML) INTRAVENOUS ONCE
Refills: 0 | Status: DISCONTINUED | OUTPATIENT
Start: 2023-11-24 | End: 2023-12-05

## 2023-11-24 RX ORDER — ALBUTEROL 90 UG/1
2 AEROSOL, METERED ORAL DAILY
Refills: 0 | Status: DISCONTINUED | OUTPATIENT
Start: 2023-11-24 | End: 2023-12-05

## 2023-11-24 RX ORDER — ATORVASTATIN CALCIUM 80 MG/1
1 TABLET, FILM COATED ORAL
Refills: 0 | DISCHARGE

## 2023-11-24 RX ORDER — MONTELUKAST 4 MG/1
1 TABLET, CHEWABLE ORAL
Refills: 0 | DISCHARGE

## 2023-11-24 RX ORDER — SODIUM CHLORIDE 9 MG/ML
1000 INJECTION, SOLUTION INTRAVENOUS
Refills: 0 | Status: DISCONTINUED | OUTPATIENT
Start: 2023-11-24 | End: 2023-12-05

## 2023-11-24 RX ORDER — PANTOPRAZOLE SODIUM 20 MG/1
1 TABLET, DELAYED RELEASE ORAL
Refills: 0 | DISCHARGE

## 2023-11-24 RX ORDER — DEXTROSE 50 % IN WATER 50 %
12.5 SYRINGE (ML) INTRAVENOUS ONCE
Refills: 0 | Status: DISCONTINUED | OUTPATIENT
Start: 2023-11-24 | End: 2023-12-05

## 2023-11-24 RX ORDER — MONTELUKAST 4 MG/1
10 TABLET, CHEWABLE ORAL DAILY
Refills: 0 | Status: DISCONTINUED | OUTPATIENT
Start: 2023-11-24 | End: 2023-11-26

## 2023-11-24 RX ORDER — INSULIN LISPRO 100/ML
VIAL (ML) SUBCUTANEOUS EVERY 6 HOURS
Refills: 0 | Status: DISCONTINUED | OUTPATIENT
Start: 2023-11-24 | End: 2023-11-27

## 2023-11-24 RX ORDER — DEXTROSE 50 % IN WATER 50 %
15 SYRINGE (ML) INTRAVENOUS ONCE
Refills: 0 | Status: DISCONTINUED | OUTPATIENT
Start: 2023-11-24 | End: 2023-12-05

## 2023-11-24 RX ORDER — AMLODIPINE BESYLATE 2.5 MG/1
1 TABLET ORAL
Refills: 0 | DISCHARGE

## 2023-11-24 RX ORDER — ALBUTEROL 90 UG/1
2 AEROSOL, METERED ORAL
Refills: 0 | DISCHARGE

## 2023-11-24 RX ORDER — AMLODIPINE BESYLATE 2.5 MG/1
5 TABLET ORAL DAILY
Refills: 0 | Status: DISCONTINUED | OUTPATIENT
Start: 2023-11-24 | End: 2023-11-26

## 2023-11-24 RX ORDER — SERTRALINE 25 MG/1
1 TABLET, FILM COATED ORAL
Refills: 0 | DISCHARGE

## 2023-11-24 RX ORDER — HEPARIN SODIUM 5000 [USP'U]/ML
5000 INJECTION INTRAVENOUS; SUBCUTANEOUS EVERY 8 HOURS
Refills: 0 | Status: DISCONTINUED | OUTPATIENT
Start: 2023-11-24 | End: 2023-12-05

## 2023-11-24 RX ORDER — SODIUM CHLORIDE 9 MG/ML
1000 INJECTION, SOLUTION INTRAVENOUS
Refills: 0 | Status: DISCONTINUED | OUTPATIENT
Start: 2023-11-24 | End: 2023-11-25

## 2023-11-24 RX ADMIN — Medication 2: at 06:39

## 2023-11-24 RX ADMIN — HEPARIN SODIUM 5000 UNIT(S): 5000 INJECTION INTRAVENOUS; SUBCUTANEOUS at 06:06

## 2023-11-24 RX ADMIN — HEPARIN SODIUM 5000 UNIT(S): 5000 INJECTION INTRAVENOUS; SUBCUTANEOUS at 23:56

## 2023-11-24 RX ADMIN — ALBUTEROL 2 PUFF(S): 90 AEROSOL, METERED ORAL at 23:39

## 2023-11-24 RX ADMIN — HEPARIN SODIUM 5000 UNIT(S): 5000 INJECTION INTRAVENOUS; SUBCUTANEOUS at 15:12

## 2023-11-24 RX ADMIN — Medication 2: at 12:37

## 2023-11-24 NOTE — H&P ADULT - ATTENDING COMMENTS
I saw and examined the patient and agree with the above note.    K56.50 Small Bowel Obstruction due to adhesions  -NPO/IVF  -Resuscitation and trend labs and urine output   -Pain control via PRN IV meds  -Small bowel follow through  ---After initial CT scan, oral contrast should be administered through the NGT(then clamped for 2 hours) and an abd xray should be obtained after 4-6 hours to evaluate if contrast passes obstruction and is visualized within the  If no passage into the colon at this time, an abd x ray should be ordered 24 hours from when the contrast was administered.  If oral contrast was given at the initial CT scan, obtain xrays at the same intervals without administering more oral contrast.  -Operative indications include prolonged SBO, increasing pain/generalized peritonitis and/or concerning repeat imaging.    I spent 15min reviewing data, images and documentation as well as in care coordination.  Greater than 50% of my time was spent in discussion regarding pre- and post-operative care as well as risk and benefits of operative intervention.    Iron Montelongo MD   Acute and Critical Care Surgery  (Cell: [phone])  Email: [email or web address]    The Acute Care Surgery (B Team) Attending Group Practice:  Dr. Damari Pedersen, Dr. William Sheriff, Dr. Glenn Thomas, Dr. Iron Montelongo and Dr. Linette Knox    Urgent issues - spectra [phone] or [phone]  Nonurgent issues - [phone]  Patient appointments or after hours - [phone] I saw and examined the patient and agree with the above note.    K56.50 Small Bowel Obstruction due to adhesions  -NPO/IVF  -Resuscitation and trend labs and urine output   -Pain control via PRN IV meds  -Small bowel follow through  ---After initial CT scan, oral contrast should be administered through the NGT(then clamped for 2 hours) and an abd xray should be obtained after 4-6 hours to evaluate if contrast passes obstruction and is visualized within the  If no passage into the colon at this time, an abd x ray should be ordered 24 hours from when the contrast was administered.  If oral contrast was given at the initial CT scan, obtain xrays at the same intervals without administering more oral contrast.  -Operative indications include prolonged SBO, increasing pain/generalized peritonitis and/or concerning repeat imaging.    I spent 15min reviewing data, images and documentation as well as in care coordination.  Greater than 50% of my time was spent in discussion regarding pre- and post-operative care as well as risk and benefits of operative intervention.    Iron Montelongo MD   Acute and Critical Care Surgery  (Cell: [phone])  Email: [email or web address]    The Acute Care Surgery (B Team) Attending Group Practice:  Dr. Damari Pedersen, Dr. William Sheriff, Dr. Glenn Thomas, Dr. Iron Montelongo and Dr. Linette Knox    Urgent issues - spectra [phone] or [phone]  Nonurgent issues - [phone]  Patient appointments or after hours - [phone] I saw and examined the patient and agree with the above note.    K56.50 Small Bowel Obstruction due to adhesions  -NPO/IVF  -Resuscitation and trend labs and urine output   -Pain control via PRN IV meds  -Small bowel follow through  ---After initial CT scan, oral contrast should be administered through the NGT(then clamped for 2 hours) and an abd xray should be obtained after 4-6 hours to evaluate if contrast passes obstruction and is visualized within the  If no passage into the colon at this time, an abd x ray should be ordered 24 hours from when the contrast was administered.  If oral contrast was given at the initial CT scan, obtain xrays at the same intervals without administering more oral contrast.  -Operative indications include prolonged SBO, increasing pain/generalized peritonitis and/or concerning repeat imaging.    I spent 15min reviewing data, images and documentation as well as in care coordination.  Greater than 50% of my time was spent in discussion regarding pre- and post-operative care as well as risk and benefits of operative intervention.    Iron Montelongo MD   Acute and Critical Care Surgery  (Cell: [phone])  Email: [email or web address]    The Acute Care Surgery (B Team) Attending Group Practice:  Dr. Damari Pedersen, Dr. William Sheriff, Dr. Glenn Thomas, Dr. Iron Montelongo and Dr. Linette Knox    Urgent issues - spectra [phone] or [phone]  Nonurgent issues - [phone]  Patient appointments or after hours - [phone]

## 2023-11-24 NOTE — ED ADULT NURSE REASSESSMENT NOTE - NS ED NURSE REASSESS COMMENT FT1
Patient returns from ultrasound. Patient awake and resting in stretcher; respirations even and unlabored, no signs/symptoms of acute distress. Patient denies dyspnea, shortness of breath, and chest pain. Patient denies pain and offers no complaints at this time. Patient cleaned and changed; skin intact; patient placed in position of comfort and medications administered per eMAR. Safety measures in place and call bell within reach.
Return from break, patient awake and resting in stretcher; respirations even and unlabored, no signs/symptoms of acute distress. Patient denies dyspnea, shortness of breath, and chest pain. Patient denies pain and offers no complaints at this time. Patient cleaned and changed, safety measures in place and call bell within reach.
report received from MICKY Porter. pt received A&Ox4 c/o N/V. surgery at bedside placing NG tube to L nostril at this time. respirations even and unlabored. VS as noted in flowsheet. brief and linens changed at this time. denies c/p, SOB, weakness, fevers, diarrhea. awaiting bed assignment. safety maintained, call bell within reach

## 2023-11-24 NOTE — PROGRESS NOTE ADULT - SUBJECTIVE AND OBJECTIVE BOX
General Surgery Progress Note    Subjective: Patient resting in bed. Patient denies subjective fever/chills, CP, SOB, n/v, or abdominal pain. +Flatus/-BM. Pain well controlled.    Objective:  Vitals:  T(C): 36.8 (11-24-23 @ 10:00), Max: 37.2 (11-24-23 @ 06:09)  HR: 83 (11-24-23 @ 10:00) (80 - 100)  BP: 166/68 (11-24-23 @ 10:00) (132/83 - 218/75)  RR: 18 (11-24-23 @ 10:00) (18 - 19)  SpO2: 98% (11-24-23 @ 10:00) (96% - 100%)  Wt(kg): --    11-23 @ 07:01  -  11-24 @ 07:00  --------------------------------------------------------  IN:    Lactated Ringers: 400 mL  Total IN: 400 mL    OUT:    IV PiggyBack: 0 mL    Nasogastric/Oral tube (mL): 50 mL    Oral Fluid: 0 mL  Total OUT: 50 mL    Total NET: 350 mL          Physical Exam:  General Appearance: no acute distress, NTND   Chest: airway intact, non-labored breathing  CV: no JVD, palpable pulses b/l  Abdomen: soft, non-tender, non-distended, +BS   Extremities: [facility]       Labs:                        13.0   7.98  )-----------( 237      ( 23 Nov 2023 16:12 )             40.2     11-23    135  |  106  |  38<H>  ----------------------------<  280<H>  5.0   |  17<L>  |  0.90    Ca    8.1<L>      23 Nov 2023 18:54  Phos  3.6     11-23  Mg     2.00     11-23    TPro  9.2<H>  /  Alb  4.6  /  TBili  0.4  /  DBili  x   /  AST  26  /  ALT  17  /  AlkPhos  137<H>  11-23    LIVER FUNCTIONS - ( 23 Nov 2023 16:12 )  Alb: 4.6 g/dL / Pro: 9.2 g/dL / ALK PHOS: 137 U/L / ALT: 17 U/L / AST: 26 U/L / GGT: x             Urinalysis Basic - ( 23 Nov 2023 18:54 )    Color: x / Appearance: x / SG: x / pH: x  Gluc: 280 mg/dL / Ketone: x  / Bili: x / Urobili: x   Blood: x / Protein: x / Nitrite: x   Leuk Esterase: x / RBC: x / WBC x   Sq Epi: x / Non Sq Epi: x / Bacteria: x

## 2023-11-24 NOTE — PROGRESS NOTE ADULT - ASSESSMENT
78F w/ pmh of hypertension, hyperlipidemia, diabetes on insulin, s/p  and AVINASH complaining of 2 days abdominal pain n/v. Pain is RLQ, associated with multiple episodes of large volume emesis. She is not passing flatus, last BM 2 days ago. She denies fevers/chills.    Plan:  - remove NGT, c/w NPO sips and chip  - IVF   - DVT PPX  - F/u med rec  - trend bowel fucntion    B team surgery [id number]

## 2023-11-24 NOTE — H&P ADULT - NSHPPHYSICALEXAM_GEN_ALL_CORE
T(C): 36.7 (11-23-23 @ 20:03), Max: 36.7 (11-23-23 @ 18:12)  HR: 94 (11-23-23 @ 20:03) (80 - 94)  BP: 176/63 (11-23-23 @ 20:03) (154/114 - 218/75)  RR: 18 (11-23-23 @ 20:03) (18 - 18)  SpO2: 100% (11-23-23 @ 20:03) (97% - 100%)  Wt(kg): --    Physical Exam:    General: WN/WD NAD  Neurology: A&Ox3, nonfocal, follows commands  Eyes: PERRLA/ EOMI  Respiratory: CTA B/L  CV: Normal rate regular rhythm  Abdominal: Softly distended, tender RLQ  Extremities: No edema  Skin: No Rashes, Hematoma, Ecchymosis

## 2023-11-24 NOTE — H&P ADULT - ASSESSMENT
78F hx , AVINASH p/w 2 days abdominal pain N/V, concern for SBO    -admit to surgery, Dr. Montelongo  -NPO/IVF  -NGT placed, follow up cxr to confirm placement  -Sister to bring home med list tomorrow  -pain control prn    B surgery  [id number] 78F hx , AVINASH p/w 2 days abdominal pain N/V, concern for SBO    -admit to surgery, Dr. Montelongo  -NPO/IVF  -NGT placed, follow up cxr to confirm placement  -Sister to bring home med list tomorrow  -pain control prn    B surgery  [id number] 78F hx , AVINASH p/w 2 days abdominal pain N/V, concern for SBO    -admit to surgery, Dr. Montelongo  -NPO/IVF  -NGT placed, follow up cxr to confirm placement  -Sister to bring home med list tomorrow  -pain control prn    B surgery  [id number]

## 2023-11-24 NOTE — PATIENT PROFILE ADULT - FALL HARM RISK - HARM RISK INTERVENTIONS

## 2023-11-24 NOTE — H&P ADULT - NSHPLABSRESULTS_GEN_ALL_CORE
13.0   7.98  )-----------( 237      ( 23 Nov 2023 16:12 )             40.2     11-23    135  |  106  |  38<H>  ----------------------------<  280<H>  5.0   |  17<L>  |  0.90    Ca    8.1<L>      23 Nov 2023 18:54  Phos  3.6     11-23  Mg     2.00     11-23    TPro  9.2<H>  /  Alb  4.6  /  TBili  0.4  /  DBili  x   /  AST  26  /  ALT  17  /  AlkPhos  137<H>  11-23    LIVER FUNCTIONS - ( 23 Nov 2023 16:12 )  Alb: 4.6 g/dL / Pro: 9.2 g/dL / ALK PHOS: 137 U/L / ALT: 17 U/L / AST: 26 U/L / GGT: x             Urinalysis Basic - ( 23 Nov 2023 18:54 )    Color: x / Appearance: x / SG: x / pH: x  Gluc: 280 mg/dL / Ketone: x  / Bili: x / Urobili: x   Blood: x / Protein: x / Nitrite: x   Leuk Esterase: x / RBC: x / WBC x   Sq Epi: x / Non Sq Epi: x / Bacteria: x      < from: CT Abdomen and Pelvis w/ IV Cont (11.23.23 @ 20:47) >    FINDINGS:  LOWER CHEST: Clear    LIVER: Within normal limits.  BILE DUCTS: Normal caliber.  GALLBLADDER: Within normal limits.  SPLEEN: Within normal limits.  PANCREAS: Mild atrophy.  ADRENALS: Within normal limits.  KIDNEYS/URETERS: Right renal cysts. Duplicated right collecting system.   Moderate right hydroureteronephrosis. Mild left hydronephrosis..    BLADDER: Bladder is distended with severe prolapse, with approximately   10.2 cm of the bladder located below the level of the pubic symphysis.  REPRODUCTIVE ORGANS: Hysterectomy.    BOWEL: Small fluid in the distal esophagus. Diffusely dilated loops of   small bowel with a transition point in the right lower quadrant (301-82).    Appendix is obscured.  PERITONEUM: Trace perihepatic ascites.  VESSELS: Atherosclerotic changes.  RETROPERITONEUM/LYMPH NODES: No lymphadenopathy.  ABDOMINAL WALL: Fat-containing periumbilical hernia.  BONES: Degenerative changes of the spine.    IMPRESSION:    1. High-grade small bowel obstruction with transition in the right lower   quadrant.  2. Moderate right hydroureteronephrosis and mild left hydronephrosis, may   be secondary to underlying severe bladder prolapse.    < end of copied text >

## 2023-11-24 NOTE — H&P ADULT - HISTORY OF PRESENT ILLNESS
78F w/ pmh of hypertension, hyperlipidemia, diabetes on insulin, s/p  and AVINASH complaining of 2 days abdominal pain n/v. Pain is RLQ, associated with multiple episodes of large volume emesis. She is not passing flatus, last BM 2 days ago. She denies fevers/chills.

## 2023-11-25 LAB
ANION GAP SERPL CALC-SCNC: 12 MMOL/L — SIGNIFICANT CHANGE UP (ref 7–14)
ANION GAP SERPL CALC-SCNC: 12 MMOL/L — SIGNIFICANT CHANGE UP (ref 7–14)
BUN SERPL-MCNC: 30 MG/DL — HIGH (ref 7–23)
BUN SERPL-MCNC: 30 MG/DL — HIGH (ref 7–23)
CALCIUM SERPL-MCNC: 9 MG/DL — SIGNIFICANT CHANGE UP (ref 8.4–10.5)
CALCIUM SERPL-MCNC: 9 MG/DL — SIGNIFICANT CHANGE UP (ref 8.4–10.5)
CHLORIDE SERPL-SCNC: 105 MMOL/L — SIGNIFICANT CHANGE UP (ref 98–107)
CHLORIDE SERPL-SCNC: 105 MMOL/L — SIGNIFICANT CHANGE UP (ref 98–107)
CO2 SERPL-SCNC: 24 MMOL/L — SIGNIFICANT CHANGE UP (ref 22–31)
CO2 SERPL-SCNC: 24 MMOL/L — SIGNIFICANT CHANGE UP (ref 22–31)
CREAT SERPL-MCNC: 1.05 MG/DL — SIGNIFICANT CHANGE UP (ref 0.5–1.3)
CREAT SERPL-MCNC: 1.05 MG/DL — SIGNIFICANT CHANGE UP (ref 0.5–1.3)
EGFR: 55 ML/MIN/1.73M2 — LOW
EGFR: 55 ML/MIN/1.73M2 — LOW
GLUCOSE BLDC GLUCOMTR-MCNC: 125 MG/DL — HIGH (ref 70–99)
GLUCOSE BLDC GLUCOMTR-MCNC: 125 MG/DL — HIGH (ref 70–99)
GLUCOSE BLDC GLUCOMTR-MCNC: 136 MG/DL — HIGH (ref 70–99)
GLUCOSE BLDC GLUCOMTR-MCNC: 136 MG/DL — HIGH (ref 70–99)
GLUCOSE BLDC GLUCOMTR-MCNC: 140 MG/DL — HIGH (ref 70–99)
GLUCOSE BLDC GLUCOMTR-MCNC: 140 MG/DL — HIGH (ref 70–99)
GLUCOSE BLDC GLUCOMTR-MCNC: 145 MG/DL — HIGH (ref 70–99)
GLUCOSE BLDC GLUCOMTR-MCNC: 145 MG/DL — HIGH (ref 70–99)
GLUCOSE SERPL-MCNC: 145 MG/DL — HIGH (ref 70–99)
GLUCOSE SERPL-MCNC: 145 MG/DL — HIGH (ref 70–99)
HCT VFR BLD CALC: 35.9 % — SIGNIFICANT CHANGE UP (ref 34.5–45)
HCT VFR BLD CALC: 35.9 % — SIGNIFICANT CHANGE UP (ref 34.5–45)
HGB BLD-MCNC: 11.6 G/DL — SIGNIFICANT CHANGE UP (ref 11.5–15.5)
HGB BLD-MCNC: 11.6 G/DL — SIGNIFICANT CHANGE UP (ref 11.5–15.5)
MAGNESIUM SERPL-MCNC: 1.9 MG/DL — SIGNIFICANT CHANGE UP (ref 1.6–2.6)
MAGNESIUM SERPL-MCNC: 1.9 MG/DL — SIGNIFICANT CHANGE UP (ref 1.6–2.6)
MCHC RBC-ENTMCNC: 30.2 PG — SIGNIFICANT CHANGE UP (ref 27–34)
MCHC RBC-ENTMCNC: 30.2 PG — SIGNIFICANT CHANGE UP (ref 27–34)
MCHC RBC-ENTMCNC: 32.3 GM/DL — SIGNIFICANT CHANGE UP (ref 32–36)
MCHC RBC-ENTMCNC: 32.3 GM/DL — SIGNIFICANT CHANGE UP (ref 32–36)
MCV RBC AUTO: 93.5 FL — SIGNIFICANT CHANGE UP (ref 80–100)
MCV RBC AUTO: 93.5 FL — SIGNIFICANT CHANGE UP (ref 80–100)
NRBC # BLD: 0 /100 WBCS — SIGNIFICANT CHANGE UP (ref 0–0)
NRBC # BLD: 0 /100 WBCS — SIGNIFICANT CHANGE UP (ref 0–0)
NRBC # FLD: 0 K/UL — SIGNIFICANT CHANGE UP (ref 0–0)
NRBC # FLD: 0 K/UL — SIGNIFICANT CHANGE UP (ref 0–0)
PHOSPHATE SERPL-MCNC: 2.9 MG/DL — SIGNIFICANT CHANGE UP (ref 2.5–4.5)
PHOSPHATE SERPL-MCNC: 2.9 MG/DL — SIGNIFICANT CHANGE UP (ref 2.5–4.5)
PLATELET # BLD AUTO: 192 K/UL — SIGNIFICANT CHANGE UP (ref 150–400)
PLATELET # BLD AUTO: 192 K/UL — SIGNIFICANT CHANGE UP (ref 150–400)
POTASSIUM SERPL-MCNC: 3.7 MMOL/L — SIGNIFICANT CHANGE UP (ref 3.5–5.3)
POTASSIUM SERPL-MCNC: 3.7 MMOL/L — SIGNIFICANT CHANGE UP (ref 3.5–5.3)
POTASSIUM SERPL-SCNC: 3.7 MMOL/L — SIGNIFICANT CHANGE UP (ref 3.5–5.3)
POTASSIUM SERPL-SCNC: 3.7 MMOL/L — SIGNIFICANT CHANGE UP (ref 3.5–5.3)
RBC # BLD: 3.84 M/UL — SIGNIFICANT CHANGE UP (ref 3.8–5.2)
RBC # BLD: 3.84 M/UL — SIGNIFICANT CHANGE UP (ref 3.8–5.2)
RBC # FLD: 12.7 % — SIGNIFICANT CHANGE UP (ref 10.3–14.5)
RBC # FLD: 12.7 % — SIGNIFICANT CHANGE UP (ref 10.3–14.5)
SODIUM SERPL-SCNC: 141 MMOL/L — SIGNIFICANT CHANGE UP (ref 135–145)
SODIUM SERPL-SCNC: 141 MMOL/L — SIGNIFICANT CHANGE UP (ref 135–145)
WBC # BLD: 10.98 K/UL — HIGH (ref 3.8–10.5)
WBC # BLD: 10.98 K/UL — HIGH (ref 3.8–10.5)
WBC # FLD AUTO: 10.98 K/UL — HIGH (ref 3.8–10.5)
WBC # FLD AUTO: 10.98 K/UL — HIGH (ref 3.8–10.5)

## 2023-11-25 PROCEDURE — 99232 SBSQ HOSP IP/OBS MODERATE 35: CPT | Mod: GC

## 2023-11-25 PROCEDURE — 74018 RADEX ABDOMEN 1 VIEW: CPT | Mod: 26

## 2023-11-25 RX ORDER — SODIUM CHLORIDE 9 MG/ML
1000 INJECTION, SOLUTION INTRAVENOUS
Refills: 0 | Status: DISCONTINUED | OUTPATIENT
Start: 2023-11-25 | End: 2023-11-26

## 2023-11-25 RX ORDER — ONDANSETRON 8 MG/1
4 TABLET, FILM COATED ORAL EVERY 6 HOURS
Refills: 0 | Status: DISCONTINUED | OUTPATIENT
Start: 2023-11-25 | End: 2023-11-26

## 2023-11-25 RX ORDER — INSULIN GLARGINE 100 [IU]/ML
30 INJECTION, SOLUTION SUBCUTANEOUS AT BEDTIME
Refills: 0 | Status: DISCONTINUED | OUTPATIENT
Start: 2023-11-25 | End: 2023-11-28

## 2023-11-25 RX ORDER — POLYETHYLENE GLYCOL 3350 17 G/17G
17 POWDER, FOR SOLUTION ORAL DAILY
Refills: 0 | Status: DISCONTINUED | OUTPATIENT
Start: 2023-11-25 | End: 2023-11-26

## 2023-11-25 RX ADMIN — HEPARIN SODIUM 5000 UNIT(S): 5000 INJECTION INTRAVENOUS; SUBCUTANEOUS at 07:03

## 2023-11-25 RX ADMIN — Medication 975 MILLIGRAM(S): at 13:00

## 2023-11-25 RX ADMIN — Medication 975 MILLIGRAM(S): at 17:43

## 2023-11-25 RX ADMIN — MONTELUKAST 10 MILLIGRAM(S): 4 TABLET, CHEWABLE ORAL at 12:24

## 2023-11-25 RX ADMIN — SODIUM CHLORIDE 100 MILLILITER(S): 9 INJECTION, SOLUTION INTRAVENOUS at 20:00

## 2023-11-25 RX ADMIN — Medication 975 MILLIGRAM(S): at 22:30

## 2023-11-25 RX ADMIN — ONDANSETRON 4 MILLIGRAM(S): 8 TABLET, FILM COATED ORAL at 14:51

## 2023-11-25 RX ADMIN — HEPARIN SODIUM 5000 UNIT(S): 5000 INJECTION INTRAVENOUS; SUBCUTANEOUS at 22:41

## 2023-11-25 RX ADMIN — AMLODIPINE BESYLATE 5 MILLIGRAM(S): 2.5 TABLET ORAL at 07:01

## 2023-11-25 RX ADMIN — Medication 975 MILLIGRAM(S): at 02:08

## 2023-11-25 RX ADMIN — INSULIN GLARGINE 30 UNIT(S): 100 INJECTION, SOLUTION SUBCUTANEOUS at 22:31

## 2023-11-25 RX ADMIN — Medication 975 MILLIGRAM(S): at 12:23

## 2023-11-25 RX ADMIN — SODIUM CHLORIDE 100 MILLILITER(S): 9 INJECTION, SOLUTION INTRAVENOUS at 17:39

## 2023-11-25 RX ADMIN — HEPARIN SODIUM 5000 UNIT(S): 5000 INJECTION INTRAVENOUS; SUBCUTANEOUS at 14:52

## 2023-11-25 RX ADMIN — Medication 975 MILLIGRAM(S): at 18:10

## 2023-11-25 RX ADMIN — ALBUTEROL 2 PUFF(S): 90 AEROSOL, METERED ORAL at 10:00

## 2023-11-25 RX ADMIN — POLYETHYLENE GLYCOL 3350 17 GRAM(S): 17 POWDER, FOR SOLUTION ORAL at 14:52

## 2023-11-25 RX ADMIN — LOSARTAN POTASSIUM 100 MILLIGRAM(S): 100 TABLET, FILM COATED ORAL at 07:03

## 2023-11-25 RX ADMIN — Medication 975 MILLIGRAM(S): at 07:01

## 2023-11-25 RX ADMIN — Medication 975 MILLIGRAM(S): at 01:25

## 2023-11-25 NOTE — PROGRESS NOTE ADULT - SUBJECTIVE AND OBJECTIVE BOX
General Surgery Progress Note    Subjective: Patient resting in bed. Patient denies subjective fever/chills, CP, SOB, n/v, or abdominal pain. +Flatus/+BM. Pain well controlled, tolerating sips and chips.     Objective:  Vitals:  T(C): 37 (11-25-23 @ 06:00), Max: 38.1 (11-24-23 @ 17:27)  HR: 84 (11-25-23 @ 06:00) (83 - 93)  BP: 158/73 (11-25-23 @ 06:00) (121/49 - 178/75)  RR: 18 (11-25-23 @ 06:00) (17 - 18)  SpO2: 100% (11-25-23 @ 06:00) (96% - 100%)  Wt(kg): --    11-24 @ 07:01  -  11-25 @ 07:00  --------------------------------------------------------  IN:    Lactated Ringers: 1600 mL    Oral Fluid: 400 mL  Total IN: 2000 mL    OUT:    IV PiggyBack: 0 mL    Nasogastric/Oral tube (mL): 100 mL    Stool (mL): 1 mL    Voided (mL): 300 mL  Total OUT: 401 mL    Total NET: 1599 mL          Physical Exam:  General Appearance: no acute distress, NTND   Chest: airway intact, non-labored breathing  CV: no JVD, palpable pulses b/l  Abdomen: soft, non-tender, non-distended, +BS   Extremities: WWP       Labs:                        11.6   10.98 )-----------( 192      ( 25 Nov 2023 06:35 )             35.9     11-25    141  |  105  |  30<H>  ----------------------------<  145<H>  3.7   |  24  |  1.05    Ca    9.0      25 Nov 2023 06:35  Phos  2.9     11-25  Mg     1.90     11-25    TPro  9.2<H>  /  Alb  4.6  /  TBili  0.4  /  DBili  x   /  AST  26  /  ALT  17  /  AlkPhos  137<H>  11-23    LIVER FUNCTIONS - ( 23 Nov 2023 16:12 )  Alb: 4.6 g/dL / Pro: 9.2 g/dL / ALK PHOS: 137 U/L / ALT: 17 U/L / AST: 26 U/L / GGT: x             Urinalysis Basic - ( 25 Nov 2023 06:35 )    Color: x / Appearance: x / SG: x / pH: x  Gluc: 145 mg/dL / Ketone: x  / Bili: x / Urobili: x   Blood: x / Protein: x / Nitrite: x   Leuk Esterase: x / RBC: x / WBC x   Sq Epi: x / Non Sq Epi: x / Bacteria: x

## 2023-11-25 NOTE — PROGRESS NOTE ADULT - ASSESSMENT
78F w/ pmh of hypertension, hyperlipidemia, diabetes on insulin, s/p  and AVINASH complaining of 2 days abdominal pain n/v. Pain is RLQ, associated with multiple episodes of large volume emesis. She is not passing flatus, last BM 2 days ago. She denies fevers/chills.    Plan:  - CLD (CCD), advance to reg diet for lunch if tolerating  - Home meds restarted  - DVT PPX  - trend bowel fucntion    B team surgery [id number]

## 2023-11-26 LAB
ANION GAP SERPL CALC-SCNC: 12 MMOL/L — SIGNIFICANT CHANGE UP (ref 7–14)
ANION GAP SERPL CALC-SCNC: 12 MMOL/L — SIGNIFICANT CHANGE UP (ref 7–14)
BASOPHILS # BLD AUTO: 0.01 K/UL — SIGNIFICANT CHANGE UP (ref 0–0.2)
BASOPHILS # BLD AUTO: 0.01 K/UL — SIGNIFICANT CHANGE UP (ref 0–0.2)
BASOPHILS NFR BLD AUTO: 0.1 % — SIGNIFICANT CHANGE UP (ref 0–2)
BASOPHILS NFR BLD AUTO: 0.1 % — SIGNIFICANT CHANGE UP (ref 0–2)
BUN SERPL-MCNC: 26 MG/DL — HIGH (ref 7–23)
BUN SERPL-MCNC: 26 MG/DL — HIGH (ref 7–23)
CALCIUM SERPL-MCNC: 8.8 MG/DL — SIGNIFICANT CHANGE UP (ref 8.4–10.5)
CALCIUM SERPL-MCNC: 8.8 MG/DL — SIGNIFICANT CHANGE UP (ref 8.4–10.5)
CHLORIDE SERPL-SCNC: 101 MMOL/L — SIGNIFICANT CHANGE UP (ref 98–107)
CHLORIDE SERPL-SCNC: 101 MMOL/L — SIGNIFICANT CHANGE UP (ref 98–107)
CO2 SERPL-SCNC: 23 MMOL/L — SIGNIFICANT CHANGE UP (ref 22–31)
CO2 SERPL-SCNC: 23 MMOL/L — SIGNIFICANT CHANGE UP (ref 22–31)
CREAT SERPL-MCNC: 0.9 MG/DL — SIGNIFICANT CHANGE UP (ref 0.5–1.3)
CREAT SERPL-MCNC: 0.9 MG/DL — SIGNIFICANT CHANGE UP (ref 0.5–1.3)
EGFR: 66 ML/MIN/1.73M2 — SIGNIFICANT CHANGE UP
EGFR: 66 ML/MIN/1.73M2 — SIGNIFICANT CHANGE UP
EOSINOPHIL # BLD AUTO: 0.12 K/UL — SIGNIFICANT CHANGE UP (ref 0–0.5)
EOSINOPHIL # BLD AUTO: 0.12 K/UL — SIGNIFICANT CHANGE UP (ref 0–0.5)
EOSINOPHIL NFR BLD AUTO: 1.2 % — SIGNIFICANT CHANGE UP (ref 0–6)
EOSINOPHIL NFR BLD AUTO: 1.2 % — SIGNIFICANT CHANGE UP (ref 0–6)
GLUCOSE BLDC GLUCOMTR-MCNC: 100 MG/DL — HIGH (ref 70–99)
GLUCOSE BLDC GLUCOMTR-MCNC: 100 MG/DL — HIGH (ref 70–99)
GLUCOSE BLDC GLUCOMTR-MCNC: 101 MG/DL — HIGH (ref 70–99)
GLUCOSE BLDC GLUCOMTR-MCNC: 101 MG/DL — HIGH (ref 70–99)
GLUCOSE BLDC GLUCOMTR-MCNC: 110 MG/DL — HIGH (ref 70–99)
GLUCOSE BLDC GLUCOMTR-MCNC: 110 MG/DL — HIGH (ref 70–99)
GLUCOSE BLDC GLUCOMTR-MCNC: 127 MG/DL — HIGH (ref 70–99)
GLUCOSE BLDC GLUCOMTR-MCNC: 127 MG/DL — HIGH (ref 70–99)
GLUCOSE BLDC GLUCOMTR-MCNC: 59 MG/DL — LOW (ref 70–99)
GLUCOSE BLDC GLUCOMTR-MCNC: 59 MG/DL — LOW (ref 70–99)
GLUCOSE BLDC GLUCOMTR-MCNC: 62 MG/DL — LOW (ref 70–99)
GLUCOSE BLDC GLUCOMTR-MCNC: 62 MG/DL — LOW (ref 70–99)
GLUCOSE BLDC GLUCOMTR-MCNC: 64 MG/DL — LOW (ref 70–99)
GLUCOSE BLDC GLUCOMTR-MCNC: 64 MG/DL — LOW (ref 70–99)
GLUCOSE SERPL-MCNC: 122 MG/DL — HIGH (ref 70–99)
GLUCOSE SERPL-MCNC: 122 MG/DL — HIGH (ref 70–99)
HCT VFR BLD CALC: 35.9 % — SIGNIFICANT CHANGE UP (ref 34.5–45)
HCT VFR BLD CALC: 35.9 % — SIGNIFICANT CHANGE UP (ref 34.5–45)
HGB BLD-MCNC: 11.3 G/DL — LOW (ref 11.5–15.5)
HGB BLD-MCNC: 11.3 G/DL — LOW (ref 11.5–15.5)
IANC: 8.64 K/UL — HIGH (ref 1.8–7.4)
IANC: 8.64 K/UL — HIGH (ref 1.8–7.4)
IMM GRANULOCYTES NFR BLD AUTO: 0.3 % — SIGNIFICANT CHANGE UP (ref 0–0.9)
IMM GRANULOCYTES NFR BLD AUTO: 0.3 % — SIGNIFICANT CHANGE UP (ref 0–0.9)
LYMPHOCYTES # BLD AUTO: 0.79 K/UL — LOW (ref 1–3.3)
LYMPHOCYTES # BLD AUTO: 0.79 K/UL — LOW (ref 1–3.3)
LYMPHOCYTES # BLD AUTO: 7.9 % — LOW (ref 13–44)
LYMPHOCYTES # BLD AUTO: 7.9 % — LOW (ref 13–44)
MAGNESIUM SERPL-MCNC: 1.9 MG/DL — SIGNIFICANT CHANGE UP (ref 1.6–2.6)
MAGNESIUM SERPL-MCNC: 1.9 MG/DL — SIGNIFICANT CHANGE UP (ref 1.6–2.6)
MCHC RBC-ENTMCNC: 29.7 PG — SIGNIFICANT CHANGE UP (ref 27–34)
MCHC RBC-ENTMCNC: 29.7 PG — SIGNIFICANT CHANGE UP (ref 27–34)
MCHC RBC-ENTMCNC: 31.5 GM/DL — LOW (ref 32–36)
MCHC RBC-ENTMCNC: 31.5 GM/DL — LOW (ref 32–36)
MCV RBC AUTO: 94.5 FL — SIGNIFICANT CHANGE UP (ref 80–100)
MCV RBC AUTO: 94.5 FL — SIGNIFICANT CHANGE UP (ref 80–100)
MONOCYTES # BLD AUTO: 0.38 K/UL — SIGNIFICANT CHANGE UP (ref 0–0.9)
MONOCYTES # BLD AUTO: 0.38 K/UL — SIGNIFICANT CHANGE UP (ref 0–0.9)
MONOCYTES NFR BLD AUTO: 3.8 % — SIGNIFICANT CHANGE UP (ref 2–14)
MONOCYTES NFR BLD AUTO: 3.8 % — SIGNIFICANT CHANGE UP (ref 2–14)
NEUTROPHILS # BLD AUTO: 8.64 K/UL — HIGH (ref 1.8–7.4)
NEUTROPHILS # BLD AUTO: 8.64 K/UL — HIGH (ref 1.8–7.4)
NEUTROPHILS NFR BLD AUTO: 86.7 % — HIGH (ref 43–77)
NEUTROPHILS NFR BLD AUTO: 86.7 % — HIGH (ref 43–77)
NRBC # BLD: 0 /100 WBCS — SIGNIFICANT CHANGE UP (ref 0–0)
NRBC # BLD: 0 /100 WBCS — SIGNIFICANT CHANGE UP (ref 0–0)
NRBC # FLD: 0 K/UL — SIGNIFICANT CHANGE UP (ref 0–0)
NRBC # FLD: 0 K/UL — SIGNIFICANT CHANGE UP (ref 0–0)
PHOSPHATE SERPL-MCNC: 2.7 MG/DL — SIGNIFICANT CHANGE UP (ref 2.5–4.5)
PHOSPHATE SERPL-MCNC: 2.7 MG/DL — SIGNIFICANT CHANGE UP (ref 2.5–4.5)
PLATELET # BLD AUTO: 195 K/UL — SIGNIFICANT CHANGE UP (ref 150–400)
PLATELET # BLD AUTO: 195 K/UL — SIGNIFICANT CHANGE UP (ref 150–400)
POTASSIUM SERPL-MCNC: 4.3 MMOL/L — SIGNIFICANT CHANGE UP (ref 3.5–5.3)
POTASSIUM SERPL-MCNC: 4.3 MMOL/L — SIGNIFICANT CHANGE UP (ref 3.5–5.3)
POTASSIUM SERPL-SCNC: 4.3 MMOL/L — SIGNIFICANT CHANGE UP (ref 3.5–5.3)
POTASSIUM SERPL-SCNC: 4.3 MMOL/L — SIGNIFICANT CHANGE UP (ref 3.5–5.3)
RBC # BLD: 3.8 M/UL — SIGNIFICANT CHANGE UP (ref 3.8–5.2)
RBC # BLD: 3.8 M/UL — SIGNIFICANT CHANGE UP (ref 3.8–5.2)
RBC # FLD: 12.6 % — SIGNIFICANT CHANGE UP (ref 10.3–14.5)
RBC # FLD: 12.6 % — SIGNIFICANT CHANGE UP (ref 10.3–14.5)
SODIUM SERPL-SCNC: 136 MMOL/L — SIGNIFICANT CHANGE UP (ref 135–145)
SODIUM SERPL-SCNC: 136 MMOL/L — SIGNIFICANT CHANGE UP (ref 135–145)
WBC # BLD: 9.97 K/UL — SIGNIFICANT CHANGE UP (ref 3.8–10.5)
WBC # BLD: 9.97 K/UL — SIGNIFICANT CHANGE UP (ref 3.8–10.5)
WBC # FLD AUTO: 9.97 K/UL — SIGNIFICANT CHANGE UP (ref 3.8–10.5)
WBC # FLD AUTO: 9.97 K/UL — SIGNIFICANT CHANGE UP (ref 3.8–10.5)

## 2023-11-26 PROCEDURE — 99232 SBSQ HOSP IP/OBS MODERATE 35: CPT | Mod: GC

## 2023-11-26 PROCEDURE — 74018 RADEX ABDOMEN 1 VIEW: CPT | Mod: 26

## 2023-11-26 RX ORDER — ACETAMINOPHEN 500 MG
1000 TABLET ORAL ONCE
Refills: 0 | Status: COMPLETED | OUTPATIENT
Start: 2023-11-26 | End: 2023-11-26

## 2023-11-26 RX ORDER — DEXTROSE MONOHYDRATE, SODIUM CHLORIDE, AND POTASSIUM CHLORIDE 50; .745; 4.5 G/1000ML; G/1000ML; G/1000ML
1000 INJECTION, SOLUTION INTRAVENOUS
Refills: 0 | Status: DISCONTINUED | OUTPATIENT
Start: 2023-11-26 | End: 2023-11-29

## 2023-11-26 RX ORDER — ONDANSETRON 8 MG/1
4 TABLET, FILM COATED ORAL ONCE
Refills: 0 | Status: COMPLETED | OUTPATIENT
Start: 2023-11-26 | End: 2023-11-28

## 2023-11-26 RX ADMIN — Medication 1.25 MILLIGRAM(S): at 12:34

## 2023-11-26 RX ADMIN — INSULIN GLARGINE 30 UNIT(S): 100 INJECTION, SOLUTION SUBCUTANEOUS at 22:34

## 2023-11-26 RX ADMIN — HEPARIN SODIUM 5000 UNIT(S): 5000 INJECTION INTRAVENOUS; SUBCUTANEOUS at 14:13

## 2023-11-26 RX ADMIN — ALBUTEROL 2 PUFF(S): 90 AEROSOL, METERED ORAL at 12:34

## 2023-11-26 RX ADMIN — Medication 1000 MILLIGRAM(S): at 15:35

## 2023-11-26 RX ADMIN — LOSARTAN POTASSIUM 100 MILLIGRAM(S): 100 TABLET, FILM COATED ORAL at 07:06

## 2023-11-26 RX ADMIN — AMLODIPINE BESYLATE 5 MILLIGRAM(S): 2.5 TABLET ORAL at 07:06

## 2023-11-26 RX ADMIN — HEPARIN SODIUM 5000 UNIT(S): 5000 INJECTION INTRAVENOUS; SUBCUTANEOUS at 22:31

## 2023-11-26 RX ADMIN — Medication 1.25 MILLIGRAM(S): at 18:08

## 2023-11-26 RX ADMIN — DEXTROSE MONOHYDRATE, SODIUM CHLORIDE, AND POTASSIUM CHLORIDE 100 MILLILITER(S): 50; .745; 4.5 INJECTION, SOLUTION INTRAVENOUS at 09:46

## 2023-11-26 RX ADMIN — Medication 400 MILLIGRAM(S): at 15:02

## 2023-11-26 RX ADMIN — Medication 975 MILLIGRAM(S): at 07:05

## 2023-11-26 RX ADMIN — HEPARIN SODIUM 5000 UNIT(S): 5000 INJECTION INTRAVENOUS; SUBCUTANEOUS at 07:05

## 2023-11-26 NOTE — PROGRESS NOTE ADULT - ASSESSMENT
78F w/ pmh of hypertension, hyperlipidemia, diabetes on insulin, s/p  and AVINASH complaining of 2 days abdominal pain n/v. Pain is RLQ, associated with multiple episodes of large volume emesis. She is not passing flatus, last BM 2 days ago. She denies fevers/chills. Given resumed bowel function NGT was removed yesterday, however today she reports abdominal pain and one episode of emesis. AXR noted increased gas pattern and physical exam concerning for possible bowel obstruction.     Plan:  - NPO/IVF/NGT  - IV Meds  - DVT PPX  - Trend bowel function  - Abdominal exams before pain meds    B team surgery [id number] 78F w/ pmh of hypertension, hyperlipidemia, diabetes on insulin, s/p  and AVINASH complaining of 2 days abdominal pain n/v. Pain is RLQ, associated with multiple episodes of large volume emesis. She is not passing flatus, last BM 2 days ago. She denies fevers/chills. Given resumed bowel function NGT was removed yesterday, however today she reports abdominal pain and one episode of emesis. AXR noted increased gas pattern and physical exam concerning for possible bowel obstruction.     Plan:  - NPO/IVF (containing dextrose)/NGT  - Transition of meds to IV  - DVT PPX  - Trend bowel function  - Abdominal exams before pain meds    B team surgery [id number] 78F w/ pmh of hypertension, hyperlipidemia, diabetes on insulin, s/p  and AVINASH complaining of 2 days abdominal pain n/v. Pain is RLQ, associated with multiple episodes of large volume emesis. She is not passing flatus, last BM 2 days ago. She denies fevers/chills. Given resumed bowel function NGT was removed yesterday, however today she reports abdominal pain and one episode of emesis. AXR noted increased gas pattern and physical exam concerning for possible bowel obstruction.     Plan:  - NPO/IVF (containing dextrose)/NGT  - Transition of meds to IV  - DVT PPX  - Trend bowel function  - Abdominal exams before pain meds  - Patient can get gum and sips of water to favor peristalsis    B team surgery [id number]

## 2023-11-26 NOTE — PROGRESS NOTE ADULT - ATTENDING COMMENTS
clinically unresolved partial SBO, presumed adhesive in origin  recommendations as above  may need to consider operative intervention if fails to completely resolve in next 24-48hrs

## 2023-11-27 LAB
ANION GAP SERPL CALC-SCNC: 8 MMOL/L — SIGNIFICANT CHANGE UP (ref 7–14)
ANION GAP SERPL CALC-SCNC: 8 MMOL/L — SIGNIFICANT CHANGE UP (ref 7–14)
BUN SERPL-MCNC: 18 MG/DL — SIGNIFICANT CHANGE UP (ref 7–23)
BUN SERPL-MCNC: 18 MG/DL — SIGNIFICANT CHANGE UP (ref 7–23)
CALCIUM SERPL-MCNC: 8.3 MG/DL — LOW (ref 8.4–10.5)
CALCIUM SERPL-MCNC: 8.3 MG/DL — LOW (ref 8.4–10.5)
CHLORIDE SERPL-SCNC: 104 MMOL/L — SIGNIFICANT CHANGE UP (ref 98–107)
CHLORIDE SERPL-SCNC: 104 MMOL/L — SIGNIFICANT CHANGE UP (ref 98–107)
CO2 SERPL-SCNC: 24 MMOL/L — SIGNIFICANT CHANGE UP (ref 22–31)
CO2 SERPL-SCNC: 24 MMOL/L — SIGNIFICANT CHANGE UP (ref 22–31)
CREAT SERPL-MCNC: 0.83 MG/DL — SIGNIFICANT CHANGE UP (ref 0.5–1.3)
CREAT SERPL-MCNC: 0.83 MG/DL — SIGNIFICANT CHANGE UP (ref 0.5–1.3)
EGFR: 73 ML/MIN/1.73M2 — SIGNIFICANT CHANGE UP
EGFR: 73 ML/MIN/1.73M2 — SIGNIFICANT CHANGE UP
GLUCOSE BLDC GLUCOMTR-MCNC: 108 MG/DL — HIGH (ref 70–99)
GLUCOSE BLDC GLUCOMTR-MCNC: 108 MG/DL — HIGH (ref 70–99)
GLUCOSE BLDC GLUCOMTR-MCNC: 117 MG/DL — HIGH (ref 70–99)
GLUCOSE BLDC GLUCOMTR-MCNC: 117 MG/DL — HIGH (ref 70–99)
GLUCOSE BLDC GLUCOMTR-MCNC: 140 MG/DL — HIGH (ref 70–99)
GLUCOSE BLDC GLUCOMTR-MCNC: 140 MG/DL — HIGH (ref 70–99)
GLUCOSE BLDC GLUCOMTR-MCNC: 167 MG/DL — HIGH (ref 70–99)
GLUCOSE BLDC GLUCOMTR-MCNC: 167 MG/DL — HIGH (ref 70–99)
GLUCOSE SERPL-MCNC: 109 MG/DL — HIGH (ref 70–99)
GLUCOSE SERPL-MCNC: 109 MG/DL — HIGH (ref 70–99)
HCT VFR BLD CALC: 31.6 % — LOW (ref 34.5–45)
HCT VFR BLD CALC: 31.6 % — LOW (ref 34.5–45)
HGB BLD-MCNC: 10.2 G/DL — LOW (ref 11.5–15.5)
HGB BLD-MCNC: 10.2 G/DL — LOW (ref 11.5–15.5)
MAGNESIUM SERPL-MCNC: 1.8 MG/DL — SIGNIFICANT CHANGE UP (ref 1.6–2.6)
MAGNESIUM SERPL-MCNC: 1.8 MG/DL — SIGNIFICANT CHANGE UP (ref 1.6–2.6)
MCHC RBC-ENTMCNC: 30.1 PG — SIGNIFICANT CHANGE UP (ref 27–34)
MCHC RBC-ENTMCNC: 30.1 PG — SIGNIFICANT CHANGE UP (ref 27–34)
MCHC RBC-ENTMCNC: 32.3 GM/DL — SIGNIFICANT CHANGE UP (ref 32–36)
MCHC RBC-ENTMCNC: 32.3 GM/DL — SIGNIFICANT CHANGE UP (ref 32–36)
MCV RBC AUTO: 93.2 FL — SIGNIFICANT CHANGE UP (ref 80–100)
MCV RBC AUTO: 93.2 FL — SIGNIFICANT CHANGE UP (ref 80–100)
NRBC # BLD: 0 /100 WBCS — SIGNIFICANT CHANGE UP (ref 0–0)
NRBC # BLD: 0 /100 WBCS — SIGNIFICANT CHANGE UP (ref 0–0)
NRBC # FLD: 0 K/UL — SIGNIFICANT CHANGE UP (ref 0–0)
NRBC # FLD: 0 K/UL — SIGNIFICANT CHANGE UP (ref 0–0)
PHOSPHATE SERPL-MCNC: 2.2 MG/DL — LOW (ref 2.5–4.5)
PHOSPHATE SERPL-MCNC: 2.2 MG/DL — LOW (ref 2.5–4.5)
PLATELET # BLD AUTO: 183 K/UL — SIGNIFICANT CHANGE UP (ref 150–400)
PLATELET # BLD AUTO: 183 K/UL — SIGNIFICANT CHANGE UP (ref 150–400)
POTASSIUM SERPL-MCNC: 4.1 MMOL/L — SIGNIFICANT CHANGE UP (ref 3.5–5.3)
POTASSIUM SERPL-MCNC: 4.1 MMOL/L — SIGNIFICANT CHANGE UP (ref 3.5–5.3)
POTASSIUM SERPL-SCNC: 4.1 MMOL/L — SIGNIFICANT CHANGE UP (ref 3.5–5.3)
POTASSIUM SERPL-SCNC: 4.1 MMOL/L — SIGNIFICANT CHANGE UP (ref 3.5–5.3)
RBC # BLD: 3.39 M/UL — LOW (ref 3.8–5.2)
RBC # BLD: 3.39 M/UL — LOW (ref 3.8–5.2)
RBC # FLD: 12.5 % — SIGNIFICANT CHANGE UP (ref 10.3–14.5)
RBC # FLD: 12.5 % — SIGNIFICANT CHANGE UP (ref 10.3–14.5)
SODIUM SERPL-SCNC: 136 MMOL/L — SIGNIFICANT CHANGE UP (ref 135–145)
SODIUM SERPL-SCNC: 136 MMOL/L — SIGNIFICANT CHANGE UP (ref 135–145)
WBC # BLD: 7.65 K/UL — SIGNIFICANT CHANGE UP (ref 3.8–10.5)
WBC # BLD: 7.65 K/UL — SIGNIFICANT CHANGE UP (ref 3.8–10.5)
WBC # FLD AUTO: 7.65 K/UL — SIGNIFICANT CHANGE UP (ref 3.8–10.5)
WBC # FLD AUTO: 7.65 K/UL — SIGNIFICANT CHANGE UP (ref 3.8–10.5)

## 2023-11-27 PROCEDURE — 99232 SBSQ HOSP IP/OBS MODERATE 35: CPT | Mod: GC

## 2023-11-27 PROCEDURE — 74018 RADEX ABDOMEN 1 VIEW: CPT | Mod: 26

## 2023-11-27 RX ORDER — PANTOPRAZOLE SODIUM 20 MG/1
40 TABLET, DELAYED RELEASE ORAL
Refills: 0 | Status: DISCONTINUED | OUTPATIENT
Start: 2023-11-27 | End: 2023-12-05

## 2023-11-27 RX ORDER — GABAPENTIN 400 MG/1
100 CAPSULE ORAL THREE TIMES A DAY
Refills: 0 | Status: DISCONTINUED | OUTPATIENT
Start: 2023-11-27 | End: 2023-12-05

## 2023-11-27 RX ORDER — DIATRIZOATE MEGLUMINE 180 MG/ML
120 INJECTION, SOLUTION INTRAVESICAL ONCE
Refills: 0 | Status: DISCONTINUED | OUTPATIENT
Start: 2023-11-27 | End: 2023-11-27

## 2023-11-27 RX ORDER — AMLODIPINE BESYLATE 2.5 MG/1
5 TABLET ORAL DAILY
Refills: 0 | Status: DISCONTINUED | OUTPATIENT
Start: 2023-11-27 | End: 2023-12-04

## 2023-11-27 RX ORDER — LOSARTAN POTASSIUM 100 MG/1
100 TABLET, FILM COATED ORAL DAILY
Refills: 0 | Status: DISCONTINUED | OUTPATIENT
Start: 2023-11-27 | End: 2023-12-04

## 2023-11-27 RX ORDER — LABETALOL HCL 100 MG
5 TABLET ORAL ONCE
Refills: 0 | Status: COMPLETED | OUTPATIENT
Start: 2023-11-27 | End: 2023-11-27

## 2023-11-27 RX ORDER — LOSARTAN POTASSIUM 100 MG/1
100 TABLET, FILM COATED ORAL DAILY
Refills: 0 | Status: DISCONTINUED | OUTPATIENT
Start: 2023-11-27 | End: 2023-11-27

## 2023-11-27 RX ORDER — DIATRIZOATE MEGLUMINE 180 MG/ML
90 INJECTION, SOLUTION INTRAVESICAL ONCE
Refills: 0 | Status: COMPLETED | OUTPATIENT
Start: 2023-11-27 | End: 2023-11-27

## 2023-11-27 RX ORDER — INSULIN LISPRO 100/ML
VIAL (ML) SUBCUTANEOUS
Refills: 0 | Status: DISCONTINUED | OUTPATIENT
Start: 2023-11-27 | End: 2023-11-28

## 2023-11-27 RX ORDER — ATORVASTATIN CALCIUM 80 MG/1
20 TABLET, FILM COATED ORAL AT BEDTIME
Refills: 0 | Status: DISCONTINUED | OUTPATIENT
Start: 2023-11-27 | End: 2023-12-05

## 2023-11-27 RX ORDER — MONTELUKAST 4 MG/1
10 TABLET, CHEWABLE ORAL DAILY
Refills: 0 | Status: DISCONTINUED | OUTPATIENT
Start: 2023-11-27 | End: 2023-12-05

## 2023-11-27 RX ADMIN — HEPARIN SODIUM 5000 UNIT(S): 5000 INJECTION INTRAVENOUS; SUBCUTANEOUS at 21:41

## 2023-11-27 RX ADMIN — HEPARIN SODIUM 5000 UNIT(S): 5000 INJECTION INTRAVENOUS; SUBCUTANEOUS at 14:42

## 2023-11-27 RX ADMIN — AMLODIPINE BESYLATE 5 MILLIGRAM(S): 2.5 TABLET ORAL at 12:28

## 2023-11-27 RX ADMIN — GABAPENTIN 100 MILLIGRAM(S): 400 CAPSULE ORAL at 14:43

## 2023-11-27 RX ADMIN — MONTELUKAST 10 MILLIGRAM(S): 4 TABLET, CHEWABLE ORAL at 12:28

## 2023-11-27 RX ADMIN — Medication 5 MILLIGRAM(S): at 11:30

## 2023-11-27 RX ADMIN — DEXTROSE MONOHYDRATE, SODIUM CHLORIDE, AND POTASSIUM CHLORIDE 75 MILLILITER(S): 50; .745; 4.5 INJECTION, SOLUTION INTRAVENOUS at 16:06

## 2023-11-27 RX ADMIN — ATORVASTATIN CALCIUM 20 MILLIGRAM(S): 80 TABLET, FILM COATED ORAL at 21:41

## 2023-11-27 RX ADMIN — GABAPENTIN 100 MILLIGRAM(S): 400 CAPSULE ORAL at 21:41

## 2023-11-27 RX ADMIN — DIATRIZOATE MEGLUMINE 90 MILLILITER(S): 180 INJECTION, SOLUTION INTRAVESICAL at 09:37

## 2023-11-27 RX ADMIN — LOSARTAN POTASSIUM 100 MILLIGRAM(S): 100 TABLET, FILM COATED ORAL at 11:30

## 2023-11-27 RX ADMIN — Medication 2: at 21:38

## 2023-11-27 RX ADMIN — ALBUTEROL 2 PUFF(S): 90 AEROSOL, METERED ORAL at 12:28

## 2023-11-27 RX ADMIN — Medication 1.25 MILLIGRAM(S): at 05:59

## 2023-11-27 RX ADMIN — Medication 1.25 MILLIGRAM(S): at 01:34

## 2023-11-27 RX ADMIN — HEPARIN SODIUM 5000 UNIT(S): 5000 INJECTION INTRAVENOUS; SUBCUTANEOUS at 05:56

## 2023-11-27 RX ADMIN — DEXTROSE MONOHYDRATE, SODIUM CHLORIDE, AND POTASSIUM CHLORIDE 100 MILLILITER(S): 50; .745; 4.5 INJECTION, SOLUTION INTRAVENOUS at 05:52

## 2023-11-27 RX ADMIN — Medication 63.75 MILLIMOLE(S): at 11:30

## 2023-11-27 RX ADMIN — INSULIN GLARGINE 30 UNIT(S): 100 INJECTION, SOLUTION SUBCUTANEOUS at 21:40

## 2023-11-27 NOTE — PROGRESS NOTE ADULT - ASSESSMENT
78F w/ pmh of hypertension, hyperlipidemia, diabetes on insulin, s/p  and AVINASH complaining of 2 days abdominal pain n/v. Pain is RLQ, associated with multiple episodes of large volume emesis. She is not passing flatus, last BM 2 days ago. She denies fevers/chills. Given resumed bowel function NGT was removed yesterday, however today she reports abdominal pain and one episode of emesis. AXR noted increased gas pattern and physical exam concerning for possible bowel obstruction. Patient with resumed bowel function and no further nausea, will follow closely today.    Plan:  - GG challenge today PO, AXR in 4 hours  - NPO/IVF (containing dextrose)  - Multiple attempts to place a NGT failed  - Transition of meds to PO  - DVT PPX  - Trend bowel function  - Abdominal exams before pain meds  - Patient can get gum and sips of water to favor peristalsis    B team surgery [id number]

## 2023-11-27 NOTE — PROGRESS NOTE ADULT - ATTENDING COMMENTS
Patient with partial sbo, having diarrhea  gastrografin challenge  gyn evaluation    I have personally interviewed and examined this patient, reviewed pertinent labs and imaging, and discussed the case with colleagues, residents, and physician assistants on B Team rounds.    The active care issues are:  1. partial sbo    The Acute Care Surgery (B Team) Attending Group Practice:  Dr. Damari Pedersen, Dr. William Sheriff, Dr. Iron Montelongo, Dr. Glenn Thomas,     urgent issues - spectra [id number]  nonurgent issues - [phone]  patient appointments or afterhours - [phone]

## 2023-11-27 NOTE — PROGRESS NOTE ADULT - SUBJECTIVE AND OBJECTIVE BOX
B TEAM SURGERY DAILY PROGRESS NOTE:     INTERVAL EVENTS: Bowel function overnight    SUBJECTIVE/ROS: Patient seen and examined at bedside by surgical team. Reports two episodes of BM, and passing gas. Denies N/V, fever/chills, sob/chest pain. Reports mild abdominal pain.     OBJECTIVE:  Vital Signs Last 24 Hrs  T(C): 36.8 (27 Nov 2023 06:00), Max: 37.2 (26 Nov 2023 10:46)  T(F): 98.2 (27 Nov 2023 06:00), Max: 99 (26 Nov 2023 22:28)  HR: 82 (27 Nov 2023 06:00) (70 - 85)  BP: 159/67 (27 Nov 2023 06:00) (154/46 - 177/77)  BP(mean): --  RR: 17 (27 Nov 2023 06:00) (16 - 18)  SpO2: 97% (27 Nov 2023 06:00) (95% - 100%)    Parameters below as of 27 Nov 2023 06:00  Patient On (Oxygen Delivery Method): room air                            10.2   7.65  )-----------( 183      ( 27 Nov 2023 06:45 )             31.6     11-27    136  |  104  |  18  ----------------------------<  109<H>  4.1   |  24  |  0.83    Ca    8.3<L>      27 Nov 2023 06:45  Phos  2.2     11-27  Mg     1.80     11-27       I&O's Detail    26 Nov 2023 07:01  -  27 Nov 2023 07:00  --------------------------------------------------------  IN:    dextrose 5% + sodium chloride 0.9% w/ Additives: 1700 mL    IV PiggyBack: 100 mL    Oral Fluid: 100 mL  Total IN: 1900 mL    OUT:    Stool (mL): 2 mL    Voided (mL): 800 mL  Total OUT: 802 mL    Total NET: 1098 mL          IMAGING:      PHYSICAL EXAM:  Constitutional: NAD  Respiratory: non-labored breathing, patent airway  Gastrointestinal: abdomen soft, nontender, nondistended  Extremities: warm  Neurological: intact

## 2023-11-28 ENCOUNTER — TRANSCRIPTION ENCOUNTER (OUTPATIENT)
Age: 78
End: 2023-11-28

## 2023-11-28 LAB
ANION GAP SERPL CALC-SCNC: 7 MMOL/L — SIGNIFICANT CHANGE UP (ref 7–14)
ANION GAP SERPL CALC-SCNC: 7 MMOL/L — SIGNIFICANT CHANGE UP (ref 7–14)
BUN SERPL-MCNC: 13 MG/DL — SIGNIFICANT CHANGE UP (ref 7–23)
BUN SERPL-MCNC: 13 MG/DL — SIGNIFICANT CHANGE UP (ref 7–23)
CALCIUM SERPL-MCNC: 8.5 MG/DL — SIGNIFICANT CHANGE UP (ref 8.4–10.5)
CALCIUM SERPL-MCNC: 8.5 MG/DL — SIGNIFICANT CHANGE UP (ref 8.4–10.5)
CHLORIDE SERPL-SCNC: 107 MMOL/L — SIGNIFICANT CHANGE UP (ref 98–107)
CHLORIDE SERPL-SCNC: 107 MMOL/L — SIGNIFICANT CHANGE UP (ref 98–107)
CO2 SERPL-SCNC: 24 MMOL/L — SIGNIFICANT CHANGE UP (ref 22–31)
CO2 SERPL-SCNC: 24 MMOL/L — SIGNIFICANT CHANGE UP (ref 22–31)
CREAT SERPL-MCNC: 0.79 MG/DL — SIGNIFICANT CHANGE UP (ref 0.5–1.3)
CREAT SERPL-MCNC: 0.79 MG/DL — SIGNIFICANT CHANGE UP (ref 0.5–1.3)
EGFR: 77 ML/MIN/1.73M2 — SIGNIFICANT CHANGE UP
EGFR: 77 ML/MIN/1.73M2 — SIGNIFICANT CHANGE UP
GLUCOSE BLDC GLUCOMTR-MCNC: 139 MG/DL — HIGH (ref 70–99)
GLUCOSE BLDC GLUCOMTR-MCNC: 139 MG/DL — HIGH (ref 70–99)
GLUCOSE BLDC GLUCOMTR-MCNC: 155 MG/DL — HIGH (ref 70–99)
GLUCOSE BLDC GLUCOMTR-MCNC: 155 MG/DL — HIGH (ref 70–99)
GLUCOSE BLDC GLUCOMTR-MCNC: 159 MG/DL — HIGH (ref 70–99)
GLUCOSE BLDC GLUCOMTR-MCNC: 159 MG/DL — HIGH (ref 70–99)
GLUCOSE BLDC GLUCOMTR-MCNC: 228 MG/DL — HIGH (ref 70–99)
GLUCOSE BLDC GLUCOMTR-MCNC: 228 MG/DL — HIGH (ref 70–99)
GLUCOSE BLDC GLUCOMTR-MCNC: 46 MG/DL — CRITICAL LOW (ref 70–99)
GLUCOSE BLDC GLUCOMTR-MCNC: 46 MG/DL — CRITICAL LOW (ref 70–99)
GLUCOSE BLDC GLUCOMTR-MCNC: 52 MG/DL — CRITICAL LOW (ref 70–99)
GLUCOSE BLDC GLUCOMTR-MCNC: 52 MG/DL — CRITICAL LOW (ref 70–99)
GLUCOSE BLDC GLUCOMTR-MCNC: 56 MG/DL — LOW (ref 70–99)
GLUCOSE BLDC GLUCOMTR-MCNC: 56 MG/DL — LOW (ref 70–99)
GLUCOSE BLDC GLUCOMTR-MCNC: 65 MG/DL — LOW (ref 70–99)
GLUCOSE BLDC GLUCOMTR-MCNC: 65 MG/DL — LOW (ref 70–99)
GLUCOSE BLDC GLUCOMTR-MCNC: 67 MG/DL — LOW (ref 70–99)
GLUCOSE BLDC GLUCOMTR-MCNC: 67 MG/DL — LOW (ref 70–99)
GLUCOSE BLDC GLUCOMTR-MCNC: 83 MG/DL — SIGNIFICANT CHANGE UP (ref 70–99)
GLUCOSE BLDC GLUCOMTR-MCNC: 83 MG/DL — SIGNIFICANT CHANGE UP (ref 70–99)
GLUCOSE SERPL-MCNC: 50 MG/DL — CRITICAL LOW (ref 70–99)
GLUCOSE SERPL-MCNC: 50 MG/DL — CRITICAL LOW (ref 70–99)
HCT VFR BLD CALC: 31.8 % — LOW (ref 34.5–45)
HCT VFR BLD CALC: 31.8 % — LOW (ref 34.5–45)
HGB BLD-MCNC: 9.9 G/DL — LOW (ref 11.5–15.5)
HGB BLD-MCNC: 9.9 G/DL — LOW (ref 11.5–15.5)
MAGNESIUM SERPL-MCNC: 1.7 MG/DL — SIGNIFICANT CHANGE UP (ref 1.6–2.6)
MAGNESIUM SERPL-MCNC: 1.7 MG/DL — SIGNIFICANT CHANGE UP (ref 1.6–2.6)
MCHC RBC-ENTMCNC: 30 PG — SIGNIFICANT CHANGE UP (ref 27–34)
MCHC RBC-ENTMCNC: 30 PG — SIGNIFICANT CHANGE UP (ref 27–34)
MCHC RBC-ENTMCNC: 31.1 GM/DL — LOW (ref 32–36)
MCHC RBC-ENTMCNC: 31.1 GM/DL — LOW (ref 32–36)
MCV RBC AUTO: 96.4 FL — SIGNIFICANT CHANGE UP (ref 80–100)
MCV RBC AUTO: 96.4 FL — SIGNIFICANT CHANGE UP (ref 80–100)
NRBC # BLD: 0 /100 WBCS — SIGNIFICANT CHANGE UP (ref 0–0)
NRBC # BLD: 0 /100 WBCS — SIGNIFICANT CHANGE UP (ref 0–0)
NRBC # FLD: 0 K/UL — SIGNIFICANT CHANGE UP (ref 0–0)
NRBC # FLD: 0 K/UL — SIGNIFICANT CHANGE UP (ref 0–0)
PHOSPHATE SERPL-MCNC: 2.4 MG/DL — LOW (ref 2.5–4.5)
PHOSPHATE SERPL-MCNC: 2.4 MG/DL — LOW (ref 2.5–4.5)
PLATELET # BLD AUTO: 201 K/UL — SIGNIFICANT CHANGE UP (ref 150–400)
PLATELET # BLD AUTO: 201 K/UL — SIGNIFICANT CHANGE UP (ref 150–400)
POTASSIUM SERPL-MCNC: 3.8 MMOL/L — SIGNIFICANT CHANGE UP (ref 3.5–5.3)
POTASSIUM SERPL-MCNC: 3.8 MMOL/L — SIGNIFICANT CHANGE UP (ref 3.5–5.3)
POTASSIUM SERPL-SCNC: 3.8 MMOL/L — SIGNIFICANT CHANGE UP (ref 3.5–5.3)
POTASSIUM SERPL-SCNC: 3.8 MMOL/L — SIGNIFICANT CHANGE UP (ref 3.5–5.3)
RBC # BLD: 3.3 M/UL — LOW (ref 3.8–5.2)
RBC # BLD: 3.3 M/UL — LOW (ref 3.8–5.2)
RBC # FLD: 12.8 % — SIGNIFICANT CHANGE UP (ref 10.3–14.5)
RBC # FLD: 12.8 % — SIGNIFICANT CHANGE UP (ref 10.3–14.5)
SODIUM SERPL-SCNC: 138 MMOL/L — SIGNIFICANT CHANGE UP (ref 135–145)
SODIUM SERPL-SCNC: 138 MMOL/L — SIGNIFICANT CHANGE UP (ref 135–145)
WBC # BLD: 6.33 K/UL — SIGNIFICANT CHANGE UP (ref 3.8–10.5)
WBC # BLD: 6.33 K/UL — SIGNIFICANT CHANGE UP (ref 3.8–10.5)
WBC # FLD AUTO: 6.33 K/UL — SIGNIFICANT CHANGE UP (ref 3.8–10.5)
WBC # FLD AUTO: 6.33 K/UL — SIGNIFICANT CHANGE UP (ref 3.8–10.5)

## 2023-11-28 PROCEDURE — 99232 SBSQ HOSP IP/OBS MODERATE 35: CPT | Mod: GC

## 2023-11-28 PROCEDURE — 74018 RADEX ABDOMEN 1 VIEW: CPT | Mod: 26

## 2023-11-28 RX ORDER — ACETAMINOPHEN 500 MG
1000 TABLET ORAL EVERY 6 HOURS
Refills: 0 | Status: COMPLETED | OUTPATIENT
Start: 2023-11-28 | End: 2023-11-29

## 2023-11-28 RX ORDER — SODIUM,POTASSIUM PHOSPHATES 278-250MG
1 POWDER IN PACKET (EA) ORAL
Refills: 0 | Status: COMPLETED | OUTPATIENT
Start: 2023-11-28 | End: 2023-11-29

## 2023-11-28 RX ORDER — INSULIN LISPRO 100/ML
VIAL (ML) SUBCUTANEOUS
Refills: 0 | Status: DISCONTINUED | OUTPATIENT
Start: 2023-11-28 | End: 2023-11-29

## 2023-11-28 RX ORDER — INSULIN LISPRO 100/ML
VIAL (ML) SUBCUTANEOUS EVERY 6 HOURS
Refills: 0 | Status: DISCONTINUED | OUTPATIENT
Start: 2023-11-28 | End: 2023-11-28

## 2023-11-28 RX ORDER — ACETAMINOPHEN 500 MG
975 TABLET ORAL ONCE
Refills: 0 | Status: COMPLETED | OUTPATIENT
Start: 2023-11-28 | End: 2023-11-28

## 2023-11-28 RX ORDER — DEXTROSE 50 % IN WATER 50 %
25 SYRINGE (ML) INTRAVENOUS ONCE
Refills: 0 | Status: COMPLETED | OUTPATIENT
Start: 2023-11-28 | End: 2023-11-28

## 2023-11-28 RX ORDER — MAGNESIUM SULFATE 500 MG/ML
2 VIAL (ML) INJECTION ONCE
Refills: 0 | Status: COMPLETED | OUTPATIENT
Start: 2023-11-28 | End: 2023-11-28

## 2023-11-28 RX ORDER — INSULIN GLARGINE 100 [IU]/ML
20 INJECTION, SOLUTION SUBCUTANEOUS AT BEDTIME
Refills: 0 | Status: DISCONTINUED | OUTPATIENT
Start: 2023-11-28 | End: 2023-11-29

## 2023-11-28 RX ADMIN — Medication 25 GRAM(S): at 21:33

## 2023-11-28 RX ADMIN — Medication 25 GRAM(S): at 08:40

## 2023-11-28 RX ADMIN — LOSARTAN POTASSIUM 100 MILLIGRAM(S): 100 TABLET, FILM COATED ORAL at 06:13

## 2023-11-28 RX ADMIN — HEPARIN SODIUM 5000 UNIT(S): 5000 INJECTION INTRAVENOUS; SUBCUTANEOUS at 22:22

## 2023-11-28 RX ADMIN — Medication 1 PACKET(S): at 18:43

## 2023-11-28 RX ADMIN — GABAPENTIN 100 MILLIGRAM(S): 400 CAPSULE ORAL at 18:43

## 2023-11-28 RX ADMIN — Medication 25 GRAM(S): at 11:05

## 2023-11-28 RX ADMIN — Medication 975 MILLIGRAM(S): at 02:51

## 2023-11-28 RX ADMIN — AMLODIPINE BESYLATE 5 MILLIGRAM(S): 2.5 TABLET ORAL at 06:13

## 2023-11-28 RX ADMIN — Medication 400 MILLIGRAM(S): at 22:22

## 2023-11-28 RX ADMIN — Medication 400 MILLIGRAM(S): at 07:37

## 2023-11-28 RX ADMIN — GABAPENTIN 100 MILLIGRAM(S): 400 CAPSULE ORAL at 22:22

## 2023-11-28 RX ADMIN — PANTOPRAZOLE SODIUM 40 MILLIGRAM(S): 20 TABLET, DELAYED RELEASE ORAL at 06:13

## 2023-11-28 RX ADMIN — HEPARIN SODIUM 5000 UNIT(S): 5000 INJECTION INTRAVENOUS; SUBCUTANEOUS at 06:14

## 2023-11-28 RX ADMIN — GABAPENTIN 100 MILLIGRAM(S): 400 CAPSULE ORAL at 06:12

## 2023-11-28 RX ADMIN — HEPARIN SODIUM 5000 UNIT(S): 5000 INJECTION INTRAVENOUS; SUBCUTANEOUS at 18:43

## 2023-11-28 RX ADMIN — Medication 975 MILLIGRAM(S): at 02:21

## 2023-11-28 RX ADMIN — ALBUTEROL 2 PUFF(S): 90 AEROSOL, METERED ORAL at 12:47

## 2023-11-28 RX ADMIN — ONDANSETRON 4 MILLIGRAM(S): 8 TABLET, FILM COATED ORAL at 06:22

## 2023-11-28 RX ADMIN — ATORVASTATIN CALCIUM 20 MILLIGRAM(S): 80 TABLET, FILM COATED ORAL at 22:22

## 2023-11-28 RX ADMIN — Medication 1000 MILLIGRAM(S): at 08:07

## 2023-11-28 RX ADMIN — MONTELUKAST 10 MILLIGRAM(S): 4 TABLET, CHEWABLE ORAL at 12:48

## 2023-11-28 NOTE — PHYSICAL THERAPY INITIAL EVALUATION ADULT - ASSISTIVE DEVICE:SIT/SUPINE, REHAB EVAL
Anemia    Anxiety    Bipolar disorder    Depression    Diabetes    High cholesterol    Hypertension    Shingles
bed rails

## 2023-11-28 NOTE — PROGRESS NOTE ADULT - SUBJECTIVE AND OBJECTIVE BOX
General Surgery Progress Note    Subjective: Patient resting in bed. Reporting persistent mild diffuse abdominal pain described as gas like pain. Patient denies subjective fever/chills, CP, SOB, or n/v. -Flatus/+BM loose x4.  controlled.     Objective:  Vitals:  T(C): 37.3 (11-28-23 @ 14:35), Max: 37.4 (11-27-23 @ 22:00)  HR: 79 (11-28-23 @ 14:35) (67 - 84)  BP: 155/77 (11-28-23 @ 14:35) (145/56 - 165/73)  RR: 18 (11-28-23 @ 14:35) (18 - 19)  SpO2: 100% (11-28-23 @ 14:35) (97% - 100%)  Wt(kg): --    11-27 @ 07:01  -  11-28 @ 07:00  --------------------------------------------------------  IN:    dextrose 5% + sodium chloride 0.9% w/ Additives: 1050 mL    Oral Fluid: 880 mL  Total IN: 1930 mL    OUT:    Stool (mL): 1 mL  Total OUT: 1 mL    Total NET: 1929 mL      11-28 @ 07:01  -  11-28 @ 15:59  --------------------------------------------------------  IN:    dextrose 5% + sodium chloride 0.9% w/ Additives: 400 mL  Total IN: 400 mL    OUT:  Total OUT: 0 mL    Total NET: 400 mL          Physical Exam:  General Appearance: no acute distress, NTND   Chest: airway intact, non-labored breathing  CV: no JVD, palpable pulses b/l  Abdomen: soft, non-tender, non-distended, +BS   Extremities: WWP       Labs:                        9.9    6.33  )-----------( 201      ( 28 Nov 2023 04:45 )             31.8     11-28    138  |  107  |  13  ----------------------------<  50<LL>  3.8   |  24  |  0.79    Ca    8.5      28 Nov 2023 04:45  Phos  2.4     11-28  Mg     1.70     11-28          Urinalysis Basic - ( 28 Nov 2023 04:45 )    Color: x / Appearance: x / SG: x / pH: x  Gluc: 50 mg/dL / Ketone: x  / Bili: x / Urobili: x   Blood: x / Protein: x / Nitrite: x   Leuk Esterase: x / RBC: x / WBC x   Sq Epi: x / Non Sq Epi: x / Bacteria: x

## 2023-11-28 NOTE — PHYSICAL THERAPY INITIAL EVALUATION ADULT - PERTINENT HX OF CURRENT PROBLEM, REHAB EVAL
78 female with prior medical history of hypertension, hyperlipidemia, diabetes on insulin, complaining of 2 days abdominal pain + nausea and vomiting. Pain is RLQ, associated with multiple episodes of large volume emesis. Concerning for possible bowel obstruction.

## 2023-11-28 NOTE — PHYSICAL THERAPY INITIAL EVALUATION ADULT - ADDITIONAL COMMENTS
Patient lives alone in an apartment, +2 steps to enter. Patient reports using rollator and independence with ADLs. Does not have home health aide, family occasionally stays over to assist if needed.     Patient left semi-supine in no apparent distress, +bed alarm, +call bell.

## 2023-11-28 NOTE — DISCHARGE NOTE PROVIDER - NSDCFUSCHEDAPPT_GEN_ALL_CORE_FT
Kellen Armando  [facility]  UROGYN [address]  Scheduled Appointment: 12/27/2023     Kellen Armando  [facility]  UROGYN [address]  Scheduled Appointment: 12/27/2023     Kellen Armando  [facility]  UROGYN [address]  Scheduled Appointment: 12/27/2023

## 2023-11-28 NOTE — DISCHARGE NOTE PROVIDER - NSDCMRMEDTOKEN_GEN_ALL_CORE_FT
albuterol 90 mcg/inh inhalation aerosol: 2 puff(s) inhaled once a day  amLODIPine 5 mg oral tablet: 1 tab(s) orally once a day  atorvastatin 20 mg oral tablet: 1 tab(s) orally once a day  Farxiga 10 mg oral tablet: 1 tab(s) orally once a day  furosemide 40 mg oral tablet: 1 tab(s) orally once a day  gabapentin 100 mg oral tablet: 100 milligram(s) orally 3 times a day  insulin glargine 100 units/mL subcutaneous solution: 60 unit(s) subcutaneous once a day (at bedtime)  irbesartan 300 mg oral tablet: 1 tab(s) orally once a day  montelukast 10 mg oral tablet: 1 tab(s) orally once a day  Ozempic 8 mg/3 mL (2 mg dose) subcutaneous solution: 2 milligram(s) subcutaneously once a day  pantoprazole 40 mg oral delayed release tablet: 1 tab(s) orally once a day  sertraline 50 mg oral tablet: 1 tab(s) orally once a day   acetaminophen 325 mg oral tablet: 3 tab(s) orally every 6 hours As needed Mild Pain (1 - 3)  Admelog 100 units/mL injectable solution: 1 unit(s) subcutaneous 3 times a day (before meals) Sliding scale  1 unit if glucose 151-200.  2 units if glucose 201-250  3units if glucose 251-300.  4 units if glucose 301-350  5 units if glucose 351-400.  6units if 401+.  albuterol 90 mcg/inh inhalation aerosol: 2 puff(s) inhaled once a day  amLODIPine 5 mg oral tablet: 1 tab(s) orally once a day  atorvastatin 20 mg oral tablet: 1 tab(s) orally once a day  gabapentin 100 mg oral tablet: 100 milligram(s) orally 3 times a day  insulin glargine 100 units/mL subcutaneous solution: 10 unit(s) subcutaneous once a day (at bedtime)  insulin lispro 100 units/mL injectable solution: 1 unit(s) subcutaneous once a day (at bedtime) Sliding scale  1 unit if glucose 251-300.   2 units if glucose 301-350.   3 units if glucose 351-400.   4 units if glucose 400+.  irbesartan 300 mg oral tablet: 1 tab(s) orally once a day  montelukast 10 mg oral tablet: 1 tab(s) orally once a day  pantoprazole 40 mg oral delayed release tablet: 1 tab(s) orally once a day  sertraline 50 mg oral tablet: 1 tab(s) orally once a day

## 2023-11-28 NOTE — DISCHARGE NOTE PROVIDER - DETAILS OF MALNUTRITION DIAGNOSIS/DIAGNOSES
Solution: GlySal 7.5% Vacuum Pressure Low Setting (Will Not Render If Set To 0): 12 Number Of Passes: 0 Vacuum Pressure Low Setting (Will Not Render If Set To 0): [address] Procedure: Fusion Solution: Beta-HD Vacuum Pressure Low Setting (Will Not Render If Set To 0): [address] [facility] Solution Override Vacuum Pressure Low Setting (Will Not Render If Set To 0): [address] [facility] Solution: Antiox-6 Consent: Written consent obtained, risks reviewed including but not limited to crusting, scabbing, blistering, scarring, darker or lighter pigmentary change, bruising, and/or incomplete response. Post-Care Instructions: I reviewed with the patient in detail post-care instructions. Patient should stay away from the sun and wear sun protection. \\n\\nPatient was instructed to not use tretinoin, retinol, scrubs, or glycolics for 5 day post treatment. Tip: Hydropeel Tip, Blue Vacuum Pressure Low Setting (Will Not Render If Set To 0): [address] Procedure: Exfoliation Tip: Hydropeel Tip, Teal Indication: skin texture Number Of Passes: 2 Tip: Hydropeel Tip, Clear Number Of Passes: 1 Location: face Tip Override Procedure: Peel Solution: Activ-4 Procedure: Extraction This patient has been assessed with a concern for Malnutrition and was treated during this hospitalization for the following Nutrition diagnosis/diagnoses:     -  12/01/2023: Moderate protein-calorie malnutrition

## 2023-11-28 NOTE — DISCHARGE NOTE PROVIDER - CARE PROVIDER_API CALL
Glenn Thomas [facility]  Surgery  [address] [place] [id number]  Phone: [phone]  Fax: [phone]  Follow Up Time: 2 weeks   Glenn Thomas [facility]  Surgery  [address] [place] [id number]  Phone: [phone]  Fax: [phone]  Follow Up Time: 2 weeks   Glenn Thomas [facility]  Surgery  [address] [place] [id number]  Phone: [phone]  Fax: [phone]  Follow Up Time: 2 weeks

## 2023-11-28 NOTE — PHYSICAL THERAPY INITIAL EVALUATION ADULT - GAIT DEVIATIONS NOTED, PT EVAL
due to left ankle pain in weightbearing/decreased janice/increased time in double stance/decreased velocity of limb motion/decreased step length/decreased stride length/decreased weight-shifting ability

## 2023-11-28 NOTE — PROGRESS NOTE ADULT - ASSESSMENT
78F w/ pmh of hypertension, hyperlipidemia, diabetes on insulin, s/p  and AVINASH complaining of 2 days abdominal pain n/v. Pain is RLQ, associated with multiple episodes of large volume emesis. She is not passing flatus, last BM 2 days ago. She denies fevers/chills. Given resumed bowel function NGT was removed yesterday, however today she reports abdominal pain and one episode of emesis. AXR noted increased gas pattern and physical exam concerning for possible bowel obstruction. Patient with resumed bowel function and no further nausea, will follow closely today.    Plan:  - NPO/IVF w/ sips and chips   - If patient vomits >> CT w/ PO contrast  - Multiple attempts to place a NGT failed  - Transition of meds to PO  - DVT PPX  - Trend bowel function  - Abdominal exams before pain meds    B team surgery [id number]

## 2023-11-28 NOTE — DISCHARGE NOTE PROVIDER - HOSPITAL COURSE
Patient was admitted with a small bowel obstruction and treated conservatively with NGT decompression and bowel rest.   With decompression, patient had return of bowel function and NGT was removed. However, patient with increased abdominal distension, nausea and vomiting.  On 11/28  Patient was given gastrograffin and serial abdominal Xrays were taken, **** PENDING **** revealing contrast moving throughout the bowel and colon. Their diet was then slowly advanced as tolerated. Once patient was tolerating regular diet, voiding and ambulating without difficulty, they were found to be stable for discharge to home. Patient was admitted with a small bowel obstruction and treated conservatively with NGT decompression and bowel rest.   With decompression, patient had return of bowel function and NGT was removed. However, patient with increased abdominal distension, nausea and vomiting.  On 11/28  Patient was given gastrograffin and serial abdominal Xrays were taken revealing contrast moving throughout the bowel and colon. Their diet was then slowly advanced as tolerated.   11/29 GYN consulted for h/o prolapse. Recommended outpatient follow up.  11/30 Endocrine consulted, patient taking Lantus 60u at home however during admission only on Lantus 10u. For discharge, it was recommended ....   Once patient was tolerating regular diet, voiding and ambulating without difficulty, they were found to be stable for discharge. Patient was admitted with a small bowel obstruction and treated conservatively with NGT decompression and bowel rest.   With decompression, patient had return of bowel function and NGT was removed. However, patient with increased abdominal distension, nausea and vomiting.  On 11/28  Patient was given gastrograffin and serial abdominal Xrays were taken revealing contrast moving throughout the bowel and colon. Their diet was then slowly advanced as tolerated.  11/29 GYN consulted for h/o prolapse. Recommended outpatient follow up.  11/30 Endocrine consulted, patient taking Lantus 60u at home however during admission only on Lantus 10u. For discharge, it was recommended to continue lantus 10u at bedtime, with low dose sliding scale with meals and at bedtime.   Once patient was tolerating regular diet, voiding, and ambulating without difficulty, they were found to be stable for discharge to rehab.

## 2023-11-28 NOTE — PROGRESS NOTE ADULT - ATTENDING COMMENTS
Patient with partial sbo, multiple episodes of diarrhea. Patient appears distended however gastrografin challenge with contrast in colon  plan  sips and chips  advance as tolerated  if vomits will rescan    I have personally interviewed and examined this patient, reviewed pertinent labs and imaging, and discussed the case with colleagues, residents, and physician assistants on B Team rounds.    The active care issues are:  1. partial sbo    The Acute Care Surgery (B Team) Attending Group Practice:  Dr. Damari Pedersen, Dr. William Sheriff, Dr. Iron Montelongo, Dr. Glenn Thomas,     urgent issues - spectra [id number]  nonurgent issues - [phone]  patient appointments or afterhours - [phone]

## 2023-11-28 NOTE — DISCHARGE NOTE PROVIDER - CARE PROVIDERS DIRECT ADDRESSES
[email or web address]

## 2023-11-28 NOTE — PHYSICAL THERAPY INITIAL EVALUATION ADULT - GENERAL OBSERVATIONS, REHAB EVAL
Patient received semi-supine in no apparent distress, +IV, agreeable to participate in PT session. 99% oxygen saturation on room air.

## 2023-11-28 NOTE — DISCHARGE NOTE PROVIDER - PROVIDER TOKENS
PROVIDER:[TOKEN:[[id number]],FOLLOWUP:[2 weeks]] PROVIDER:[TOKEN:[[id number]],FOLLOWUP:[2 weeks]] PROVIDER:[TOKEN:[[id number]],FOLLOWUP:[2 weeks]]

## 2023-11-28 NOTE — DISCHARGE NOTE PROVIDER - NSDCCPCAREPLAN_GEN_ALL_CORE_FT
PRINCIPAL DISCHARGE DIAGNOSIS  Diagnosis: SBO (small bowel obstruction)  Assessment and Plan of Treatment: DIET: Return to your usual diet.  NOTIFY YOUR SURGEON IF YOU HAVE: any bleeding that does not stop, any pus draining from your wound(s), any fever (over 100.4 F) persistent nausea/vomiting, or if your pain is not controlled on your discharge pain medications, unable to urinate.  Please follow up with your primary care physician in one week regarding your hospitalization, bring copies of your discharge paperwork.  Please follow up with your surgeon, Dr. Thomas within 3 weeks of discharge. Please call to schedule an appoinment.     PRINCIPAL DISCHARGE DIAGNOSIS  Diagnosis: SBO (small bowel obstruction)  Assessment and Plan of Treatment: DIET: Return to your usual diet.  NOTIFY YOUR SURGEON IF YOU HAVE: any bleeding that does not stop, any pus draining from your wound(s), any fever (over 100.4 F) persistent nausea/vomiting, or if your pain is not controlled on your discharge pain medications, unable to urinate.  Please follow up with your primary care physician in one week regarding your hospitalization, bring copies of your discharge paperwork.  Please follow up with your surgeon, Dr. Thomas within 3 weeks of discharge. Please call to schedule an appoinment.      SECONDARY DISCHARGE DIAGNOSES  Diagnosis: DM2 (diabetes mellitus, type 2)  Assessment and Plan of Treatment: Please follow up with outpatient Endocrinology regarding resuming home diabetic medications. If you do not have one, please contact  to schedule an appointment with [facility] Endocrine located at [address] [place] [address], [address].     PRINCIPAL DISCHARGE DIAGNOSIS  Diagnosis: SBO (small bowel obstruction)  Assessment and Plan of Treatment: DIET: Return to your usual diet.  NOTIFY YOUR SURGEON IF YOU HAVE: any bleeding that does not stop, any pus draining from your wound(s), any fever (over 100.4 F) persistent nausea/vomiting, or if your pain is not controlled on your discharge pain medications, unable to urinate.  Please follow up with your primary care physician in one week regarding your hospitalization, bring copies of your discharge paperwork.  Please follow up with your surgeon, Dr. Thomas within 3 weeks of discharge. Please call to schedule an appoinment.      SECONDARY DISCHARGE DIAGNOSES  Diagnosis: DM2 (diabetes mellitus, type 2)  Assessment and Plan of Treatment: Please follow up with outpatient Endocrinology regarding resuming home diabetic medications. If you do not have one, please contact  to schedule an appointment with [facility] Endocrine located at [address] [place] [address], [address].     PRINCIPAL DISCHARGE DIAGNOSIS  Diagnosis: SBO (small bowel obstruction)  Assessment and Plan of Treatment: DIET: Return to your usual diet.  NOTIFY YOUR SURGEON IF YOU HAVE: any bleeding that does not stop, any pus draining from your wound(s), any fever (over 100.4 F) persistent nausea/vomiting, or if your pain is not controlled on your discharge pain medications, unable to urinate.  Please follow up with your primary care physician in one week regarding your hospitalization, bring copies of your discharge paperwork.  Please follow up with your surgeon, Dr. Thomas within 3 weeks of discharge. Please call to schedule an appoinment.      SECONDARY DISCHARGE DIAGNOSES  Diagnosis: DM2 (diabetes mellitus, type 2)  Assessment and Plan of Treatment: Please follow up with outpatient Endocrinology regarding resuming home diabetic medications. If you do not have one, please contact  to schedule an appointment with [facility] Endocrine located at [address] [place] [address], [address].     PRINCIPAL DISCHARGE DIAGNOSIS  Diagnosis: SBO (small bowel obstruction)  Assessment and Plan of Treatment: DIET: Return to your usual diet.  NOTIFY YOUR SURGEON IF YOU HAVE: any bleeding that does not stop, any pus draining from your wound(s), any fever (over 100.4 F) persistent nausea/vomiting, or if your pain is not controlled on your discharge pain medications, unable to urinate.  Please follow up with your primary care physician in one week regarding your hospitalization, bring copies of your discharge paperwork.  Please follow up with your surgeon, Dr. Thomas within 3 weeks of discharge. Please call to schedule an appoinment.      SECONDARY DISCHARGE DIAGNOSES  Diagnosis: DM2 (diabetes mellitus, type 2)  Assessment and Plan of Treatment: Please follow up with outpatient Endocrinologist, Dr. Te Saucedo, at [phone]; [address] [facility] [address]. If you have any difficulty, please contact  to schedule an appointment with [facility] Endocrine located at [address] [facility], [address].     PRINCIPAL DISCHARGE DIAGNOSIS  Diagnosis: SBO (small bowel obstruction)  Assessment and Plan of Treatment: DIET: Return to your usual diet.  NOTIFY YOUR SURGEON IF YOU HAVE: any bleeding that does not stop, any pus draining from your wound(s), any fever (over 100.4 F) persistent nausea/vomiting, or if your pain is not controlled on your discharge pain medications, unable to urinate.  Please follow up with your primary care physician in one week regarding your hospitalization, bring copies of your discharge paperwork.  Please follow up with your surgeon, Dr. Thomas within 3 weeks of discharge. Please call to schedule an appoinment.      SECONDARY DISCHARGE DIAGNOSES  Diagnosis: DM2 (diabetes mellitus, type 2)  Assessment and Plan of Treatment: Please follow up with outpatient Endocrinologist, Dr. Te Saucedo, at [phone]; [address] [facility] [address]. If you have any difficulty, please contact  to schedule an appointment with [facility] Endocrine located at [address] [facility], [address].     PRINCIPAL DISCHARGE DIAGNOSIS  Diagnosis: SBO (small bowel obstruction)  Assessment and Plan of Treatment: DIET: Return to your usual diet.  NOTIFY YOUR SURGEON IF YOU HAVE: any bleeding that does not stop, any pus draining from your wound(s), any fever (over 100.4 F) persistent nausea/vomiting, or if your pain is not controlled on your discharge pain medications, unable to urinate.  Please follow up with your primary care physician in one week regarding your hospitalization, bring copies of your discharge paperwork.  Please follow up with your surgeon, Dr. Thomas within 3 weeks of discharge. Please call to schedule an appoinment.      SECONDARY DISCHARGE DIAGNOSES  Diagnosis: DM2 (diabetes mellitus, type 2)  Assessment and Plan of Treatment: Please follow up with outpatient Endocrinologist, Dr. Te Saucedo, at [phone]; [address] [facility] [address]. If you have any difficulty, please contact  to schedule an appointment with [facility] Endocrine located at [address] [facility], [address].     PRINCIPAL DISCHARGE DIAGNOSIS  Diagnosis: SBO (small bowel obstruction)  Assessment and Plan of Treatment: DIET: Return to your usual diet.  NOTIFY YOUR SURGEON IF YOU HAVE: any bleeding that does not stop, any pus draining from your wound(s), any fever (over 100.4 F) persistent nausea/vomiting, or if your pain is not controlled on your discharge pain medications, unable to urinate.  Please follow up with your primary care physician in one week regarding your hospitalization, bring copies of your discharge paperwork.  Please follow up with your surgeon, Dr. Thomas within 3 weeks of discharge. Please call to schedule an appoinment.      SECONDARY DISCHARGE DIAGNOSES  Diagnosis: DM2 (diabetes mellitus, type 2)  Assessment and Plan of Treatment: Please follow up with outpatient Endocrinologist, Dr. Te Saucedo, at [phone]; [address] [facility] [address]. If you have any difficulty, please contact  to schedule an appointment with [facility] Endocrine located at [address] [facility], [address].  C-peptide 2.0 (pt producing endogenous insulin) serum glucose 127; pt could possibly be off insulin and switched to an oral regimen from rehab to home; tentative plan could be Farxiga 10mg p.o. Daily, Metformin (dose TBD based on GFR), And Ozempic 2mg sq weekly (dose may need to be adjusted depending on how long pt has been off of Ozempic); If must go home on insulin; would not start metformin and would defer restarting Farxiga and Ozempic (sister administers) once follow up with Endocrinologist if glucose remains stable at rehab.  As per Sister she is able to give Lantus once a day for pt.     PRINCIPAL DISCHARGE DIAGNOSIS  Diagnosis: SBO (small bowel obstruction)  Assessment and Plan of Treatment: DIET: Return to your usual diet.  NOTIFY YOUR SURGEON IF YOU HAVE: any bleeding that does not stop, any pus draining from your wound(s), any fever (over 100.4 F) persistent nausea/vomiting, or if your pain is not controlled on your discharge pain medications, unable to urinate.  Please follow up with your primary care physician in one week regarding your hospitalization, bring copies of your discharge paperwork.  Please follow up with your surgeon, Dr. Thomas within 3 weeks of discharge. Please call to schedule an appoinment.      SECONDARY DISCHARGE DIAGNOSES  Diagnosis: DM2 (diabetes mellitus, type 2)  Assessment and Plan of Treatment: Please follow up with outpatient Endocrinologist, Dr. Te Saucedo, at [phone]; [address] [facility] [address]. If you have any difficulty, please contact  to schedule an appointment with [facility] Endocrine located at [address] [facility], [address].  C-peptide 2.0 (pt producing endogenous insulin) serum glucose 127; pt could possibly be off insulin and switched to an oral regimen from rehab to home; tentative plan could be Farxiga 10mg p.o. Daily, Metformin (dose TBD based on GFR), And Ozempic 2mg sq weekly (dose may need to be adjusted depending on how long pt has been off of Ozempic); If must go home on insulin; would not start metformin and would defer restarting Farxiga and Ozempic (sister administers) once follow up with Endocrinologist if glucose remains stable at rehab.  As per Sister she is able to give Lantus once a day for pt.     PRINCIPAL DISCHARGE DIAGNOSIS  Diagnosis: SBO (small bowel obstruction)  Assessment and Plan of Treatment: DIET: Return to your usual diet.  NOTIFY YOUR SURGEON IF YOU HAVE: any bleeding that does not stop, any pus draining from your wound(s), any fever (over 100.4 F) persistent nausea/vomiting, or if your pain is not controlled on your discharge pain medications, unable to urinate.  Please follow up with your primary care physician in one week regarding your hospitalization, bring copies of your discharge paperwork.  Please follow up with your surgeon, Dr. Thomas within 3 weeks of discharge. Please call to schedule an appoinment.      SECONDARY DISCHARGE DIAGNOSES  Diagnosis: DM2 (diabetes mellitus, type 2)  Assessment and Plan of Treatment: Please follow up with outpatient Endocrinologist, Dr. Te Saucedo, at [phone]; [phone] [facility] [address]. If you have any difficulty, please contact  to schedule an appointment with [facility] Endocrine located at [address] [facility], [address].  C-peptide 2.0 (pt producing endogenous insulin) serum glucose 127; pt could possibly be off insulin and switched to an oral regimen from rehab to home; tentative plan could be Farxiga 10mg p.o. Daily, Metformin (dose TBD based on GFR), And Ozempic 2mg sq weekly (dose may need to be adjusted depending on how long pt has been off of Ozempic); If must go home on insulin; would not start metformin and would defer restarting Farxiga and Ozempic (sister administers) once follow up with Endocrinologist if glucose remains stable at rehab.  As per Sister she is able to give Lantus once a day for pt.

## 2023-11-29 LAB
ANION GAP SERPL CALC-SCNC: 9 MMOL/L — SIGNIFICANT CHANGE UP (ref 7–14)
ANION GAP SERPL CALC-SCNC: 9 MMOL/L — SIGNIFICANT CHANGE UP (ref 7–14)
BUN SERPL-MCNC: 14 MG/DL — SIGNIFICANT CHANGE UP (ref 7–23)
BUN SERPL-MCNC: 14 MG/DL — SIGNIFICANT CHANGE UP (ref 7–23)
CALCIUM SERPL-MCNC: 8.5 MG/DL — SIGNIFICANT CHANGE UP (ref 8.4–10.5)
CALCIUM SERPL-MCNC: 8.5 MG/DL — SIGNIFICANT CHANGE UP (ref 8.4–10.5)
CHLORIDE SERPL-SCNC: 106 MMOL/L — SIGNIFICANT CHANGE UP (ref 98–107)
CHLORIDE SERPL-SCNC: 106 MMOL/L — SIGNIFICANT CHANGE UP (ref 98–107)
CO2 SERPL-SCNC: 22 MMOL/L — SIGNIFICANT CHANGE UP (ref 22–31)
CO2 SERPL-SCNC: 22 MMOL/L — SIGNIFICANT CHANGE UP (ref 22–31)
CREAT SERPL-MCNC: 1.04 MG/DL — SIGNIFICANT CHANGE UP (ref 0.5–1.3)
CREAT SERPL-MCNC: 1.04 MG/DL — SIGNIFICANT CHANGE UP (ref 0.5–1.3)
EGFR: 55 ML/MIN/1.73M2 — LOW
EGFR: 55 ML/MIN/1.73M2 — LOW
GLUCOSE BLDC GLUCOMTR-MCNC: 101 MG/DL — HIGH (ref 70–99)
GLUCOSE BLDC GLUCOMTR-MCNC: 101 MG/DL — HIGH (ref 70–99)
GLUCOSE BLDC GLUCOMTR-MCNC: 104 MG/DL — HIGH (ref 70–99)
GLUCOSE BLDC GLUCOMTR-MCNC: 104 MG/DL — HIGH (ref 70–99)
GLUCOSE BLDC GLUCOMTR-MCNC: 124 MG/DL — HIGH (ref 70–99)
GLUCOSE BLDC GLUCOMTR-MCNC: 124 MG/DL — HIGH (ref 70–99)
GLUCOSE BLDC GLUCOMTR-MCNC: 139 MG/DL — HIGH (ref 70–99)
GLUCOSE BLDC GLUCOMTR-MCNC: 139 MG/DL — HIGH (ref 70–99)
GLUCOSE BLDC GLUCOMTR-MCNC: 173 MG/DL — HIGH (ref 70–99)
GLUCOSE BLDC GLUCOMTR-MCNC: 173 MG/DL — HIGH (ref 70–99)
GLUCOSE BLDC GLUCOMTR-MCNC: 180 MG/DL — HIGH (ref 70–99)
GLUCOSE BLDC GLUCOMTR-MCNC: 180 MG/DL — HIGH (ref 70–99)
GLUCOSE SERPL-MCNC: 119 MG/DL — HIGH (ref 70–99)
GLUCOSE SERPL-MCNC: 119 MG/DL — HIGH (ref 70–99)
HCT VFR BLD CALC: 34.3 % — LOW (ref 34.5–45)
HCT VFR BLD CALC: 34.3 % — LOW (ref 34.5–45)
HGB BLD-MCNC: 10.4 G/DL — LOW (ref 11.5–15.5)
HGB BLD-MCNC: 10.4 G/DL — LOW (ref 11.5–15.5)
MAGNESIUM SERPL-MCNC: 2.1 MG/DL — SIGNIFICANT CHANGE UP (ref 1.6–2.6)
MAGNESIUM SERPL-MCNC: 2.1 MG/DL — SIGNIFICANT CHANGE UP (ref 1.6–2.6)
MCHC RBC-ENTMCNC: 29.5 PG — SIGNIFICANT CHANGE UP (ref 27–34)
MCHC RBC-ENTMCNC: 29.5 PG — SIGNIFICANT CHANGE UP (ref 27–34)
MCHC RBC-ENTMCNC: 30.3 GM/DL — LOW (ref 32–36)
MCHC RBC-ENTMCNC: 30.3 GM/DL — LOW (ref 32–36)
MCV RBC AUTO: 97.4 FL — SIGNIFICANT CHANGE UP (ref 80–100)
MCV RBC AUTO: 97.4 FL — SIGNIFICANT CHANGE UP (ref 80–100)
NRBC # BLD: 0 /100 WBCS — SIGNIFICANT CHANGE UP (ref 0–0)
NRBC # BLD: 0 /100 WBCS — SIGNIFICANT CHANGE UP (ref 0–0)
NRBC # FLD: 0 K/UL — SIGNIFICANT CHANGE UP (ref 0–0)
NRBC # FLD: 0 K/UL — SIGNIFICANT CHANGE UP (ref 0–0)
PHOSPHATE SERPL-MCNC: 3.1 MG/DL — SIGNIFICANT CHANGE UP (ref 2.5–4.5)
PHOSPHATE SERPL-MCNC: 3.1 MG/DL — SIGNIFICANT CHANGE UP (ref 2.5–4.5)
PLATELET # BLD AUTO: 228 K/UL — SIGNIFICANT CHANGE UP (ref 150–400)
PLATELET # BLD AUTO: 228 K/UL — SIGNIFICANT CHANGE UP (ref 150–400)
POTASSIUM SERPL-MCNC: 4.9 MMOL/L — SIGNIFICANT CHANGE UP (ref 3.5–5.3)
POTASSIUM SERPL-MCNC: 4.9 MMOL/L — SIGNIFICANT CHANGE UP (ref 3.5–5.3)
POTASSIUM SERPL-SCNC: 4.9 MMOL/L — SIGNIFICANT CHANGE UP (ref 3.5–5.3)
POTASSIUM SERPL-SCNC: 4.9 MMOL/L — SIGNIFICANT CHANGE UP (ref 3.5–5.3)
RBC # BLD: 3.52 M/UL — LOW (ref 3.8–5.2)
RBC # BLD: 3.52 M/UL — LOW (ref 3.8–5.2)
RBC # FLD: 12.9 % — SIGNIFICANT CHANGE UP (ref 10.3–14.5)
RBC # FLD: 12.9 % — SIGNIFICANT CHANGE UP (ref 10.3–14.5)
SODIUM SERPL-SCNC: 137 MMOL/L — SIGNIFICANT CHANGE UP (ref 135–145)
SODIUM SERPL-SCNC: 137 MMOL/L — SIGNIFICANT CHANGE UP (ref 135–145)
WBC # BLD: 5.57 K/UL — SIGNIFICANT CHANGE UP (ref 3.8–10.5)
WBC # BLD: 5.57 K/UL — SIGNIFICANT CHANGE UP (ref 3.8–10.5)
WBC # FLD AUTO: 5.57 K/UL — SIGNIFICANT CHANGE UP (ref 3.8–10.5)
WBC # FLD AUTO: 5.57 K/UL — SIGNIFICANT CHANGE UP (ref 3.8–10.5)

## 2023-11-29 PROCEDURE — 99232 SBSQ HOSP IP/OBS MODERATE 35: CPT | Mod: GC

## 2023-11-29 PROCEDURE — 99221 1ST HOSP IP/OBS SF/LOW 40: CPT

## 2023-11-29 RX ORDER — INSULIN LISPRO 100/ML
VIAL (ML) SUBCUTANEOUS AT BEDTIME
Refills: 0 | Status: DISCONTINUED | OUTPATIENT
Start: 2023-11-29 | End: 2023-12-05

## 2023-11-29 RX ORDER — INSULIN GLARGINE 100 [IU]/ML
10 INJECTION, SOLUTION SUBCUTANEOUS AT BEDTIME
Refills: 0 | Status: DISCONTINUED | OUTPATIENT
Start: 2023-11-29 | End: 2023-12-05

## 2023-11-29 RX ORDER — INSULIN LISPRO 100/ML
VIAL (ML) SUBCUTANEOUS
Refills: 0 | Status: DISCONTINUED | OUTPATIENT
Start: 2023-11-29 | End: 2023-12-05

## 2023-11-29 RX ADMIN — Medication 1 PACKET(S): at 06:35

## 2023-11-29 RX ADMIN — ATORVASTATIN CALCIUM 20 MILLIGRAM(S): 80 TABLET, FILM COATED ORAL at 21:30

## 2023-11-29 RX ADMIN — GABAPENTIN 100 MILLIGRAM(S): 400 CAPSULE ORAL at 21:30

## 2023-11-29 RX ADMIN — GABAPENTIN 100 MILLIGRAM(S): 400 CAPSULE ORAL at 13:30

## 2023-11-29 RX ADMIN — DEXTROSE MONOHYDRATE, SODIUM CHLORIDE, AND POTASSIUM CHLORIDE 50 MILLILITER(S): 50; .745; 4.5 INJECTION, SOLUTION INTRAVENOUS at 13:30

## 2023-11-29 RX ADMIN — Medication 400 MILLIGRAM(S): at 03:11

## 2023-11-29 RX ADMIN — HEPARIN SODIUM 5000 UNIT(S): 5000 INJECTION INTRAVENOUS; SUBCUTANEOUS at 06:36

## 2023-11-29 RX ADMIN — AMLODIPINE BESYLATE 5 MILLIGRAM(S): 2.5 TABLET ORAL at 06:35

## 2023-11-29 RX ADMIN — GABAPENTIN 100 MILLIGRAM(S): 400 CAPSULE ORAL at 06:35

## 2023-11-29 RX ADMIN — LOSARTAN POTASSIUM 100 MILLIGRAM(S): 100 TABLET, FILM COATED ORAL at 06:35

## 2023-11-29 RX ADMIN — HEPARIN SODIUM 5000 UNIT(S): 5000 INJECTION INTRAVENOUS; SUBCUTANEOUS at 21:30

## 2023-11-29 RX ADMIN — ALBUTEROL 2 PUFF(S): 90 AEROSOL, METERED ORAL at 13:36

## 2023-11-29 RX ADMIN — INSULIN GLARGINE 10 UNIT(S): 100 INJECTION, SOLUTION SUBCUTANEOUS at 21:30

## 2023-11-29 RX ADMIN — HEPARIN SODIUM 5000 UNIT(S): 5000 INJECTION INTRAVENOUS; SUBCUTANEOUS at 13:31

## 2023-11-29 RX ADMIN — MONTELUKAST 10 MILLIGRAM(S): 4 TABLET, CHEWABLE ORAL at 13:30

## 2023-11-29 RX ADMIN — PANTOPRAZOLE SODIUM 40 MILLIGRAM(S): 20 TABLET, DELAYED RELEASE ORAL at 06:35

## 2023-11-29 NOTE — PROVIDER CONTACT NOTE (HYPOGLYCEMIA EVENT) - NS PROVIDER CONTACT CONTRIBUTING FACTORS OF EPISODE
Patient NPO greater than 8 hours
Poor oral intake within the last 24 hours
Poor oral intake within the last 24 hours

## 2023-11-29 NOTE — CONSULT NOTE ADULT - SUBJECTIVE AND OBJECTIVE BOX
MALDONADO JACKSON  78y  Female [id number]    HPI:  78F w/ PMH of hypertension, hyperlipidemia, diabetes on insulin currently admitted for management of SBO. GYN consulted for erosions on pelvic organ prolapse.       Name of GYN Physician: [facility]- Dr. Man  OBHx: x2 c/s   GynHx: Denies fibroids, cysts, endometriosis, STI's, Abnormal pap smears   PMH: hypertension, hyperlipidemia, diabetes on insulin  PSH: AVINASH  Meds: Patient unsure of names of medication  Allx: Penicillin  Social History:  Denies smoking use, drug use, alcohol use.       Vital Signs Last 24 Hrs  T(C): 36.9 (29 Nov 2023 10:00), Max: 37.3 (28 Nov 2023 14:35)  T(F): 98.5 (29 Nov 2023 10:00), Max: 99.2 (28 Nov 2023 14:35)  HR: 73 (29 Nov 2023 10:00) (65 - 82)  BP: 157/54 (29 Nov 2023 10:00) (152/70 - 171/71)  BP(mean): --  RR: 18 (29 Nov 2023 10:00) (16 - 18)  SpO2: 98% (29 Nov 2023 10:00) (95% - 100%)    Parameters below as of 29 Nov 2023 10:00  Patient On (Oxygen Delivery Method): room air        Physical Exam:   General: lying comfortably in bed, NAD   HEENT: neck supple, full ROM  Lungs: normal respirations  Abd: Soft, non-tender, non-distended  : vaginal bulge visualized extending beyond the introitus. [id number] vertical erosion noted, no bleeding or purulence noted from erosion.          LABS:                        10.4   5.57  )-----------( 228      ( 29 Nov 2023 07:05 )             34.3     11-29    137  |  106  |  14  ----------------------------<  119<H>  4.9   |  22  |  1.04    Ca    8.5      29 Nov 2023 07:05  Phos  3.1     11-29  Mg     2.10     11-29      I&O's Detail    28 Nov 2023 07:01  -  29 Nov 2023 07:00  --------------------------------------------------------  IN:    dextrose 5% + sodium chloride 0.9% w/ Additives: 1300 mL    Oral Fluid: 580 mL  Total IN: 1880 mL    OUT:    Voided (mL): 400 mL  Total OUT: 400 mL    Total NET: 1480 mL      29 Nov 2023 07:01  -  29 Nov 2023 12:24  --------------------------------------------------------  IN:    dextrose 5% + sodium chloride 0.9% w/ Additives: 300 mL    Oral Fluid: 200 mL  Total IN: 500 mL    OUT:  Total OUT: 0 mL    Total NET: 500 mL          Urinalysis Basic - ( 29 Nov 2023 07:05 )    Color: x / Appearance: x / SG: x / pH: x  Gluc: 119 mg/dL / Ketone: x  / Bili: x / Urobili: x   Blood: x / Protein: x / Nitrite: x   Leuk Esterase: x / RBC: x / WBC x   Sq Epi: x / Non Sq Epi: x / Bacteria: x        RADIOLOGY & ADDITIONAL STUDIES:     MALDONADO JACKSON  78y  Female [id number]    HPI:  78F w/ PMH of hypertension, hyperlipidemia, diabetes on insulin currently admitted for management of SBO. GYN consulted for erosions on pelvic organ prolapse. Patient states she had a pessary placed for prolapse in the summer which she states fell out after one day. She states she then had another placed which helped for unknown duration of time but then was removed in September due to pain and discomfort. Patient states since the pessary was moved 3 months ago she has been dealing with the prolapse on the outside of the introitus She states recently she has noticed a red area on the mucosal wall that is causing a burning sensation.       Name of GYN Physician: [facility]- Dr. Man  OBHx: x2 c/s   GynHx: Denies fibroids, cysts, endometriosis, STI's, Abnormal pap smears   PMH: hypertension, hyperlipidemia, diabetes on insulin  PSH: AVINASH  Meds: Patient unsure of names of medication  Allx: Penicillin  Social History:  Denies smoking use, drug use, alcohol use.       Vital Signs Last 24 Hrs  T(C): 36.9 (29 Nov 2023 10:00), Max: 37.3 (28 Nov 2023 14:35)  T(F): 98.5 (29 Nov 2023 10:00), Max: 99.2 (28 Nov 2023 14:35)  HR: 73 (29 Nov 2023 10:00) (65 - 82)  BP: 157/54 (29 Nov 2023 10:00) (152/70 - 171/71)  BP(mean): --  RR: 18 (29 Nov 2023 10:00) (16 - 18)  SpO2: 98% (29 Nov 2023 10:00) (95% - 100%)    Parameters below as of 29 Nov 2023 10:00  Patient On (Oxygen Delivery Method): room air        Physical Exam:   General: lying comfortably in bed, NAD   HEENT: neck supple, full ROM  Lungs: normal respirations  Abd: Soft, non-tender, non-distended  : vaginal bulge visualized extending beyond the introitus. [id number] vertical erosion noted, no bleeding or purulence noted from erosion.          LABS:                        10.4   5.57  )-----------( 228      ( 29 Nov 2023 07:05 )             34.3     11-29    137  |  106  |  14  ----------------------------<  119<H>  4.9   |  22  |  1.04    Ca    8.5      29 Nov 2023 07:05  Phos  3.1     11-29  Mg     2.10     11-29      I&O's Detail    28 Nov 2023 07:01  -  29 Nov 2023 07:00  --------------------------------------------------------  IN:    dextrose 5% + sodium chloride 0.9% w/ Additives: 1300 mL    Oral Fluid: 580 mL  Total IN: 1880 mL    OUT:    Voided (mL): 400 mL  Total OUT: 400 mL    Total NET: 1480 mL      29 Nov 2023 07:01  -  29 Nov 2023 12:24  --------------------------------------------------------  IN:    dextrose 5% + sodium chloride 0.9% w/ Additives: 300 mL    Oral Fluid: 200 mL  Total IN: 500 mL    OUT:  Total OUT: 0 mL    Total NET: 500 mL          Urinalysis Basic - ( 29 Nov 2023 07:05 )    Color: x / Appearance: x / SG: x / pH: x  Gluc: 119 mg/dL / Ketone: x  / Bili: x / Urobili: x   Blood: x / Protein: x / Nitrite: x   Leuk Esterase: x / RBC: x / WBC x   Sq Epi: x / Non Sq Epi: x / Bacteria: x        RADIOLOGY & ADDITIONAL STUDIES:     MALDONADO JACKSON  78y  Female [id number]    HPI:  78F w/ PMH of hypertension, hyperlipidemia, diabetes on insulin currently admitted for management of SBO. GYN consulted for erosions on pelvic organ prolapse. Patient states she had a pessary placed for prolapse in the summer which she states fell out after one day. She states she then had another placed which helped for unknown duration of time but then was removed in September due to discomfort and improper fit. Patient states since the pessary was removed 3 months ago she has been dealing with the prolapse on the outside of the introitus which is new for her. She states recently she has noticed a red area on the mucosal wall that is causing a burning sensation. She denies issues with urination or bowel movements prior to her most recent admission. She denies vaginal bleeding, fevers, chills, chest pain, SOB.       Name of GYN Physician: [facility]- Dr. Man  OBHx: x2 c/s   GynHx: Denies fibroids, cysts, endometriosis, STI's, Abnormal pap smears   PMH: hypertension, hyperlipidemia, diabetes on insulin  PSH: AVINASH  Meds: Patient unsure of names of medication  Allx: Penicillin  Social History:  Denies smoking use, drug use, alcohol use.       Vital Signs Last 24 Hrs  T(C): 36.9 (29 Nov 2023 10:00), Max: 37.3 (28 Nov 2023 14:35)  T(F): 98.5 (29 Nov 2023 10:00), Max: 99.2 (28 Nov 2023 14:35)  HR: 73 (29 Nov 2023 10:00) (65 - 82)  BP: 157/54 (29 Nov 2023 10:00) (152/70 - 171/71)  BP(mean): --  RR: 18 (29 Nov 2023 10:00) (16 - 18)  SpO2: 98% (29 Nov 2023 10:00) (95% - 100%)    Parameters below as of 29 Nov 2023 10:00  Patient On (Oxygen Delivery Method): room air        Physical Exam:   General: lying comfortably in bed, NAD   HEENT: neck supple, full ROM  Lungs: normal respirations  Abd: Soft, non-tender, non-distended  : vaginal bulge visualized extending beyond the introitus. [id number] vertical erosion noted, no bleeding or purulence noted from erosion.           LABS:                        10.4   5.57  )-----------( 228      ( 29 Nov 2023 07:05 )             34.3     11-29    137  |  106  |  14  ----------------------------<  119<H>  4.9   |  22  |  1.04    Ca    8.5      29 Nov 2023 07:05  Phos  3.1     11-29  Mg     2.10     11-29      I&O's Detail    28 Nov 2023 07:01  -  29 Nov 2023 07:00  --------------------------------------------------------  IN:    dextrose 5% + sodium chloride 0.9% w/ Additives: 1300 mL    Oral Fluid: 580 mL  Total IN: 1880 mL    OUT:    Voided (mL): 400 mL  Total OUT: 400 mL    Total NET: 1480 mL      29 Nov 2023 07:01  -  29 Nov 2023 12:24  --------------------------------------------------------  IN:    dextrose 5% + sodium chloride 0.9% w/ Additives: 300 mL    Oral Fluid: 200 mL  Total IN: 500 mL    OUT:  Total OUT: 0 mL    Total NET: 500 mL          Urinalysis Basic - ( 29 Nov 2023 07:05 )    Color: x / Appearance: x / SG: x / pH: x  Gluc: 119 mg/dL / Ketone: x  / Bili: x / Urobili: x   Blood: x / Protein: x / Nitrite: x   Leuk Esterase: x / RBC: x / WBC x   Sq Epi: x / Non Sq Epi: x / Bacteria: x        RADIOLOGY & ADDITIONAL STUDIES:  < from: CT Abdomen and Pelvis w/ IV Cont (11.23.23 @ 20:47) >  ACC: [id number] EXAM:  CT ABDOMEN AND PELVIS IC   ORDERED BY: BRANDI GALARZA     PROCEDURE DATE:  11/23/2023          INTERPRETATION:  CLINICAL INFORMATION: Lightheadedness, nausea, vomiting,   right upper quadrant pain for one day.    COMPARISON: Abdominal ultrasound performed same day    CONTRAST/COMPLICATIONS:  IV Contrast: Omnipaque 350  90 cc administered   10 cc discarded  Oral Contrast: NONE  Complications: None reported at time of study completion    PROCEDURE:  CT of the Abdomen and Pelvis was performed.  Sagittal and coronal reformats were performed.    FINDINGS:  LOWER CHEST: Clear    LIVER: Within normal limits.  BILE DUCTS: Normal caliber.  GALLBLADDER: Within normal limits.  SPLEEN: Within normal limits.  PANCREAS: Mild atrophy.  ADRENALS: Within normal limits.  KIDNEYS/URETERS: Right renal cysts. Duplicated right collecting system.   Moderate right hydroureteronephrosis. Mild left hydronephrosis..    BLADDER: Bladder is distended with severe prolapse, with approximately   10.2 cm of the bladder located below the level of the pubic symphysis.  REPRODUCTIVE ORGANS: Hysterectomy.    BOWEL: Small fluid in the distal esophagus. Diffusely dilated loops of   small bowel with a transition point in the right lower quadrant (301-82).    Appendix is obscured.  PERITONEUM: Trace perihepatic ascites.  VESSELS: Atherosclerotic changes.  RETROPERITONEUM/LYMPH NODES: No lymphadenopathy.  ABDOMINAL WALL: Fat-containing periumbilical hernia.  BONES: Degenerative changes of the spine.    IMPRESSION:    1. High-grade small bowel obstruction with transition in the right lower   quadrant.  2. Moderate right hydroureteronephrosis and mild left hydronephrosis, may   be secondary to underlying severe bladder prolapse.    --- End of Report ---        VAIBHAV BAÑUELOS MD; Resident Radiologist  This document has been electronically signed.  SANJUANITA STEVENSON MD; Attending Radiologist  This document has been electronically signed. Nov 23 2023 10:24PM    < end of copied text >     MALDONADO JACKSON  78y  Female [id number]    HPI:  78F w/ PMH of hypertension, hyperlipidemia, diabetes on insulin currently admitted for management of SBO. GYN consulted for erosions on pelvic organ prolapse. Patient states she had a pessary placed for prolapse in the summer which she states fell out after one day. She states she then had another placed which helped for unknown duration of time but then was removed in September due to discomfort and improper fit. Patient states since the pessary was removed 3 months ago she has been dealing with the prolapse on the outside of the introitus which is new for her. She states recently she has noticed a red area on the mucosal wall that is causing a burning sensation. She denies issues with urination or bowel movements prior to her most recent admission. She denies vaginal bleeding, fevers, chills, chest pain, SOB.       Name of GYN Physician: [facility]- Dr. Man  OBHx: x2 c/s   GynHx: Denies fibroids, cysts, endometriosis, STI's, Abnormal pap smears   PMH: hypertension, hyperlipidemia, diabetes on insulin  PSH: AVINASH  Meds: Patient unsure of names of medication  Allx: Penicillin  Social History:  Denies smoking use, drug use, alcohol use.       Vital Signs Last 24 Hrs  T(C): 36.9 (29 Nov 2023 10:00), Max: 37.3 (28 Nov 2023 14:35)  T(F): 98.5 (29 Nov 2023 10:00), Max: 99.2 (28 Nov 2023 14:35)  HR: 73 (29 Nov 2023 10:00) (65 - 82)  BP: 157/54 (29 Nov 2023 10:00) (152/70 - 171/71)  BP(mean): --  RR: 18 (29 Nov 2023 10:00) (16 - 18)  SpO2: 98% (29 Nov 2023 10:00) (95% - 100%)    Parameters below as of 29 Nov 2023 10:00  Patient On (Oxygen Delivery Method): room air        Physical Exam:   General: lying comfortably in bed, NAD   HEENT: neck supple, full ROM  Lungs: normal respirations  Abd: Soft, non-tender, non-distended  : limited due to patient's mobility limitations. Complete procidentia with vaginal bulge visualized extending beyond the introitus. 3x1cm vertical erosion noted, without bleeding or purulence. Severe vulvovaginal atrophy.     LABS:                        10.4   5.57  )-----------( 228      ( 29 Nov 2023 07:05 )             34.3     11-29    137  |  106  |  14  ----------------------------<  119<H>  4.9   |  22  |  1.04    Ca    8.5      29 Nov 2023 07:05  Phos  3.1     11-29  Mg     2.10     11-29      I&O's Detail    28 Nov 2023 07:01  -  29 Nov 2023 07:00  --------------------------------------------------------  IN:    dextrose 5% + sodium chloride 0.9% w/ Additives: 1300 mL    Oral Fluid: 580 mL  Total IN: 1880 mL    OUT:    Voided (mL): 400 mL  Total OUT: 400 mL    Total NET: 1480 mL      29 Nov 2023 07:01  -  29 Nov 2023 12:24  --------------------------------------------------------  IN:    dextrose 5% + sodium chloride 0.9% w/ Additives: 300 mL    Oral Fluid: 200 mL  Total IN: 500 mL    OUT:  Total OUT: 0 mL    Total NET: 500 mL          Urinalysis Basic - ( 29 Nov 2023 07:05 )    Color: x / Appearance: x / SG: x / pH: x  Gluc: 119 mg/dL / Ketone: x  / Bili: x / Urobili: x   Blood: x / Protein: x / Nitrite: x   Leuk Esterase: x / RBC: x / WBC x   Sq Epi: x / Non Sq Epi: x / Bacteria: x        RADIOLOGY & ADDITIONAL STUDIES:  < from: CT Abdomen and Pelvis w/ IV Cont (11.23.23 @ 20:47) >  ACC: [id number] EXAM:  CT ABDOMEN AND PELVIS IC   ORDERED BY: BRANDI GEOVANNI     PROCEDURE DATE:  11/23/2023          INTERPRETATION:  CLINICAL INFORMATION: Lightheadedness, nausea, vomiting,   right upper quadrant pain for one day.    COMPARISON: Abdominal ultrasound performed same day    CONTRAST/COMPLICATIONS:  IV Contrast: Omnipaque 350  90 cc administered   10 cc discarded  Oral Contrast: NONE  Complications: None reported at time of study completion    PROCEDURE:  CT of the Abdomen and Pelvis was performed.  Sagittal and coronal reformats were performed.    FINDINGS:  LOWER CHEST: Clear    LIVER: Within normal limits.  BILE DUCTS: Normal caliber.  GALLBLADDER: Within normal limits.  SPLEEN: Within normal limits.  PANCREAS: Mild atrophy.  ADRENALS: Within normal limits.  KIDNEYS/URETERS: Right renal cysts. Duplicated right collecting system.   Moderate right hydroureteronephrosis. Mild left hydronephrosis..    BLADDER: Bladder is distended with severe prolapse, with approximately   10.2 cm of the bladder located below the level of the pubic symphysis.  REPRODUCTIVE ORGANS: Hysterectomy.    BOWEL: Small fluid in the distal esophagus. Diffusely dilated loops of   small bowel with a transition point in the right lower quadrant (301-82).    Appendix is obscured.  PERITONEUM: Trace perihepatic ascites.  VESSELS: Atherosclerotic changes.  RETROPERITONEUM/LYMPH NODES: No lymphadenopathy.  ABDOMINAL WALL: Fat-containing periumbilical hernia.  BONES: Degenerative changes of the spine.    IMPRESSION:    1. High-grade small bowel obstruction with transition in the right lower   quadrant.  2. Moderate right hydroureteronephrosis and mild left hydronephrosis, may   be secondary to underlying severe bladder prolapse.    --- End of Report ---        VAIBHAV BAÑUELOS MD; Resident Radiologist  This document has been electronically signed.  SANJUANITA STEVENSON MD; Attending Radiologist  This document has been electronically signed. Nov 23 2023 10:24PM    < end of copied text >

## 2023-11-29 NOTE — PROVIDER CONTACT NOTE (HYPOGLYCEMIA EVENT) - NS PROVIDER CONTACT NOTE-TREATMENT-HYPO
2 apple juices given at 0845/4 oz Fruit Juice (Specify quantity, date/time)
4 oz Fruit Juice (Specify quantity, date/time)/Dextrose 50% 25 Grams IV Push
4 oz Fruit Juice (Specify quantity, date/time)/Dextrose 50% 25 Grams IV Push

## 2023-11-29 NOTE — CONSULT NOTE ADULT - ASSESSMENT
78F w/ PMH of hypertension, hyperlipidemia, diabetes on insulin currently admitted for management of SBO. GYN consulted for erosions on pelvic organ prolapse. Per chart review, patient had pessary (GH LS 3 1/4) removed in September due to in adequate fit and indentation and was left out for pelvic rest. Patient has upcoming appointment with [facility] on December 28th in which the plan is for the patient to be fitted for a new pessary. Discussed at length with patient that although uncomfortable, pelvic organ prolapse is not life threatening.     - Follow up with [facility]ro[facility] outpatient for pessary fitting   - Keep the area clean with proper hygiene       ELYSSA Leal  Seen and examined w/ N.Shira PGY4   78F w/ PMH of hypertension, hyperlipidemia, diabetes on insulin currently admitted for management of SBO. GYN consulted for evaluation of pelvic pressure and discomfort when sitting in the setting of stage IV pelvic organ prolapse. Per chart review, patient had pessary (GH LS 3 1/4) removed in September due to inadequate fit and indentation, and was left out for pelvic rest. Patient has upcoming appointment with [facility] on December 28th in which the plan is for the patient to be re-fitted for a new pessary. Discussed at length with patient that although uncomfortable, pelvic organ prolapse is not life threatening.     - No acute GYN interventions indicated at this time  - Follow up with [facility]ro[facility]cology outpatient for pessary fitting and evaluation/management of genitourinary atrophy   - Reinforced importance of perineal hygiene and supportive care to prevent infection/tissue erosion    ELYSSA Leal  Seen and examined w/ N.Shira PGY4  d/w GYN attending Dr. Barragan and urogynecology attending Dr. Padron

## 2023-11-29 NOTE — CHART NOTE - NSCHARTNOTEFT_GEN_A_CORE
Patient with hypoglycemia to 67, apple juice provided per RN prior to this provider's acknowledgment. Repeat BG requested 5 minutes after this provider was contacted. Repeat at 65. Dextrose 50% 25 gms IV push requested from RN. Repeat BG in 20 minutes showed increase to 159. Patient was due for bedtime lantus and admelog at this time, requested RN to hold night insulin upon discussion with senior Dr. Mcintyre. Plan to reassess hypoglycemia tomorrow and recommend discussion with endocrinology via primary team.

## 2023-11-29 NOTE — PROGRESS NOTE ADULT - ASSESSMENT
78F w/ pmh of hypertension, hyperlipidemia, diabetes on insulin, s/p  and AVINASH complaining of 2 days abdominal pain n/v. Pain is RLQ, associated with multiple episodes of large volume emesis. She is not passing flatus, last BM 2 days ago. She denies fevers/chills. Given resumed bowel function NGT was removed yesterday, however today she reports abdominal pain and one episode of emesis. AXR noted increased gas pattern and physical exam concerning for possible bowel obstruction. Patient with resumed bowel function and no further nausea, diet has been slowly advance with appropriate but minimal tolerance.     Plan:  - CLD  - If patient vomits >> CT w/ PO contrast  - Follow abdominal function  - Transition of meds to PO  - GYN consult for vaginal stump prolapse with mucosal laceration  - DVTppx    B team surgery [id number]

## 2023-11-29 NOTE — CONSULT NOTE ADULT - ATTENDING COMMENTS
Agree with above  Pt admitted with SBO  Known POP of vaginal cuff, seen outpatient by Dr. Man, in the process of finding the right pessary fit  No acute intervention at this time, f/u outpatient  Dr. Padron aware of pt's admission    david [place]

## 2023-11-29 NOTE — OCCUPATIONAL THERAPY INITIAL EVALUATION ADULT - PERTINENT HX OF CURRENT PROBLEM, REHAB EVAL
Pt is a 77 y/o female with prior medical history of hypertension, hyperlipidemia, diabetes on insulin, complaining of 2 days abdominal pain + nausea and vomiting. Pain is RLQ, associated with multiple episodes of large volume emesis. Concerning for possible bowel obstruction.

## 2023-11-29 NOTE — PROVIDER CONTACT NOTE (HYPOGLYCEMIA EVENT) - NS PROVIDER CONTACT RECOMMEND-HYPO
Fluids changed to Dextrose 5% 0.9%NS and 20meq potassium @ 100ml/hr
4 oz apple juice given, fingerstick rechecked and BG 65; 25g D50 given IVP and BG went up to 159. As per provider Wendi stewart and Ethan, team would prefer her hyper- than hypoglycemic

## 2023-11-29 NOTE — PROGRESS NOTE ADULT - SUBJECTIVE AND OBJECTIVE BOX
B TEAM SURGERY DAILY PROGRESS NOTE:     INTERVAL EVENTS: Glucose overnight 67, required holding basal insulin and dextrose amp.    SUBJECTIVE/ROS: Patient seen and examined at bedside by surgical team. Reports feeling more comfortable today, with decrease abdominal pain, denies N/V, fever/chills, sob/chest pain.     OBJECTIVE:  Vital Signs Last 24 Hrs  T(C): 36.4 (29 Nov 2023 06:35), Max: 37.3 (28 Nov 2023 14:35)  T(F): 97.5 (29 Nov 2023 06:35), Max: 99.2 (28 Nov 2023 14:35)  HR: 65 (29 Nov 2023 06:35) (65 - 82)  BP: 152/72 (29 Nov 2023 06:35) (145/56 - 171/71)  BP(mean): --  RR: 17 (29 Nov 2023 06:35) (16 - 18)  SpO2: 95% (29 Nov 2023 06:35) (95% - 100%)    Parameters below as of 29 Nov 2023 06:35  Patient On (Oxygen Delivery Method): room air                            10.4   5.57  )-----------( 228      ( 29 Nov 2023 07:05 )             34.3     11-28    138  |  107  |  13  ----------------------------<  50<LL>  3.8   |  24  |  0.79    Ca    8.5      28 Nov 2023 04:45  Phos  2.4     11-28  Mg     1.70     11-28       I&O's Detail    28 Nov 2023 07:01  -  29 Nov 2023 07:00  --------------------------------------------------------  IN:    dextrose 5% + sodium chloride 0.9% w/ Additives: 1300 mL    Oral Fluid: 580 mL  Total IN: 1880 mL    OUT:    Voided (mL): 400 mL  Total OUT: 400 mL    Total NET: 1480 mL          IMAGING:      PHYSICAL EXAM:  Constitutional: NAD  Respiratory: non-labored breathing, patent airway  Gastrointestinal: abdomen soft, obese, nontender, slightly distended, midline hernia.  Extremities: warm  Neurological: intact

## 2023-11-29 NOTE — PROGRESS NOTE ADULT - ATTENDING COMMENTS
I have personally interviewed and examined this patient, reviewed pertinent labs and imaging, and discussed the case with colleagues, residents, and physician assistants on B Team rounds.  More than 50% of this 30 minute encounter including face to face with the patient was spent counseling and/or coordination of care on sbo.  Time included review of vitals, labs, imaging, discussion with consultants and coordination with the operating room/emergency department.    77yo F with h/o C section and AVINASH admitted with sbo, initially improved, recrudescence, now improving again. Tolerating clears. Having GI function.     - Advance diet slowly   - monitor for tolerance     The active care issues are:  1. sbo    The Acute Care Surgery (B Team) Attending Group Practice:  Dr. Linette Knox    urgent issues - spectra [id number]  nonurgent issues - [phone]  patient appointments or afterhours - [phone]

## 2023-11-29 NOTE — PROVIDER CONTACT NOTE (HYPOGLYCEMIA EVENT) - NS PROVIDER CONTACT BACKGROUND-HYPO
Age: 78y    Gender: Female    POCT Blood Glucose:  159 mg/dL (11-28-23 @ 21:50)  155 mg/dL (11-28-23 @ 21:49)  65 mg/dL (11-28-23 @ 21:29)  67 mg/dL (11-28-23 @ 21:08)  56 mg/dL (11-28-23 @ 21:06)  83 mg/dL (11-28-23 @ 12:20)  139 mg/dL (11-28-23 @ 09:01)  228 mg/dL (11-28-23 @ 08:45)      eMAR:atorvastatin   20 milliGRAM(s) Oral (11-28-23 @ 22:22)    dextrose 50% Injectable   25 Gram(s) IV Push (11-28-23 @ 21:33)    dextrose 50% Injectable   25 Gram(s) IV Push (11-28-23 @ 08:40)    
Age: 78y    Gender: Female    POCT Blood Glucose:  83 mg/dL (11-28-23 @ 12:20)  139 mg/dL (11-28-23 @ 09:01)  228 mg/dL (11-28-23 @ 08:45)  52 mg/dL (11-28-23 @ 08:26)  46 mg/dL (11-28-23 @ 08:25)  167 mg/dL (11-27-23 @ 21:30)  140 mg/dL (11-27-23 @ 17:48)      eMAR:atorvastatin   20 milliGRAM(s) Oral (11-27-23 @ 21:41)    dextrose 50% Injectable   25 Gram(s) IV Push (11-28-23 @ 08:40)    insulin glargine Injectable (LANTUS)   30 Unit(s) SubCutaneous (11-27-23 @ 21:40)    insulin lispro (ADMELOG) corrective regimen sliding scale   2 Unit(s) SubCutaneous (11-27-23 @ 21:38)    
Age: 78y    Gender: Female    POCT Blood Glucose:  62 mg/dL (11-26-23 @ 09:03)  64 mg/dL (11-26-23 @ 09:01)  59 mg/dL (11-26-23 @ 08:27)  125 mg/dL (11-25-23 @ 22:07)  140 mg/dL (11-25-23 @ 17:38)  145 mg/dL (11-25-23 @ 12:13)      eMAR:  insulin glargine Injectable (LANTUS)   30 Unit(s) SubCutaneous (11-25-23 @ 22:31)

## 2023-11-30 LAB
ANION GAP SERPL CALC-SCNC: 9 MMOL/L — SIGNIFICANT CHANGE UP (ref 7–14)
ANION GAP SERPL CALC-SCNC: 9 MMOL/L — SIGNIFICANT CHANGE UP (ref 7–14)
BUN SERPL-MCNC: 16 MG/DL — SIGNIFICANT CHANGE UP (ref 7–23)
BUN SERPL-MCNC: 16 MG/DL — SIGNIFICANT CHANGE UP (ref 7–23)
CALCIUM SERPL-MCNC: 8.8 MG/DL — SIGNIFICANT CHANGE UP (ref 8.4–10.5)
CALCIUM SERPL-MCNC: 8.8 MG/DL — SIGNIFICANT CHANGE UP (ref 8.4–10.5)
CHLORIDE SERPL-SCNC: 103 MMOL/L — SIGNIFICANT CHANGE UP (ref 98–107)
CHLORIDE SERPL-SCNC: 103 MMOL/L — SIGNIFICANT CHANGE UP (ref 98–107)
CO2 SERPL-SCNC: 23 MMOL/L — SIGNIFICANT CHANGE UP (ref 22–31)
CO2 SERPL-SCNC: 23 MMOL/L — SIGNIFICANT CHANGE UP (ref 22–31)
CREAT SERPL-MCNC: 1.01 MG/DL — SIGNIFICANT CHANGE UP (ref 0.5–1.3)
CREAT SERPL-MCNC: 1.01 MG/DL — SIGNIFICANT CHANGE UP (ref 0.5–1.3)
EGFR: 57 ML/MIN/1.73M2 — LOW
EGFR: 57 ML/MIN/1.73M2 — LOW
GLUCOSE BLDC GLUCOMTR-MCNC: 122 MG/DL — HIGH (ref 70–99)
GLUCOSE BLDC GLUCOMTR-MCNC: 122 MG/DL — HIGH (ref 70–99)
GLUCOSE BLDC GLUCOMTR-MCNC: 156 MG/DL — HIGH (ref 70–99)
GLUCOSE BLDC GLUCOMTR-MCNC: 156 MG/DL — HIGH (ref 70–99)
GLUCOSE BLDC GLUCOMTR-MCNC: 162 MG/DL — HIGH (ref 70–99)
GLUCOSE BLDC GLUCOMTR-MCNC: 162 MG/DL — HIGH (ref 70–99)
GLUCOSE BLDC GLUCOMTR-MCNC: 98 MG/DL — SIGNIFICANT CHANGE UP (ref 70–99)
GLUCOSE BLDC GLUCOMTR-MCNC: 98 MG/DL — SIGNIFICANT CHANGE UP (ref 70–99)
GLUCOSE SERPL-MCNC: 144 MG/DL — HIGH (ref 70–99)
GLUCOSE SERPL-MCNC: 144 MG/DL — HIGH (ref 70–99)
HCT VFR BLD CALC: 30.7 % — LOW (ref 34.5–45)
HCT VFR BLD CALC: 30.7 % — LOW (ref 34.5–45)
HGB BLD-MCNC: 10.1 G/DL — LOW (ref 11.5–15.5)
HGB BLD-MCNC: 10.1 G/DL — LOW (ref 11.5–15.5)
MAGNESIUM SERPL-MCNC: 1.9 MG/DL — SIGNIFICANT CHANGE UP (ref 1.6–2.6)
MAGNESIUM SERPL-MCNC: 1.9 MG/DL — SIGNIFICANT CHANGE UP (ref 1.6–2.6)
MCHC RBC-ENTMCNC: 31.3 PG — SIGNIFICANT CHANGE UP (ref 27–34)
MCHC RBC-ENTMCNC: 31.3 PG — SIGNIFICANT CHANGE UP (ref 27–34)
MCHC RBC-ENTMCNC: 32.9 GM/DL — SIGNIFICANT CHANGE UP (ref 32–36)
MCHC RBC-ENTMCNC: 32.9 GM/DL — SIGNIFICANT CHANGE UP (ref 32–36)
MCV RBC AUTO: 95 FL — SIGNIFICANT CHANGE UP (ref 80–100)
MCV RBC AUTO: 95 FL — SIGNIFICANT CHANGE UP (ref 80–100)
NRBC # BLD: 0 /100 WBCS — SIGNIFICANT CHANGE UP (ref 0–0)
NRBC # BLD: 0 /100 WBCS — SIGNIFICANT CHANGE UP (ref 0–0)
NRBC # FLD: 0 K/UL — SIGNIFICANT CHANGE UP (ref 0–0)
NRBC # FLD: 0 K/UL — SIGNIFICANT CHANGE UP (ref 0–0)
PHOSPHATE SERPL-MCNC: 2.8 MG/DL — SIGNIFICANT CHANGE UP (ref 2.5–4.5)
PHOSPHATE SERPL-MCNC: 2.8 MG/DL — SIGNIFICANT CHANGE UP (ref 2.5–4.5)
PLATELET # BLD AUTO: 230 K/UL — SIGNIFICANT CHANGE UP (ref 150–400)
PLATELET # BLD AUTO: 230 K/UL — SIGNIFICANT CHANGE UP (ref 150–400)
POTASSIUM SERPL-MCNC: 4.7 MMOL/L — SIGNIFICANT CHANGE UP (ref 3.5–5.3)
POTASSIUM SERPL-MCNC: 4.7 MMOL/L — SIGNIFICANT CHANGE UP (ref 3.5–5.3)
POTASSIUM SERPL-SCNC: 4.7 MMOL/L — SIGNIFICANT CHANGE UP (ref 3.5–5.3)
POTASSIUM SERPL-SCNC: 4.7 MMOL/L — SIGNIFICANT CHANGE UP (ref 3.5–5.3)
RBC # BLD: 3.23 M/UL — LOW (ref 3.8–5.2)
RBC # BLD: 3.23 M/UL — LOW (ref 3.8–5.2)
RBC # FLD: 13 % — SIGNIFICANT CHANGE UP (ref 10.3–14.5)
RBC # FLD: 13 % — SIGNIFICANT CHANGE UP (ref 10.3–14.5)
SODIUM SERPL-SCNC: 135 MMOL/L — SIGNIFICANT CHANGE UP (ref 135–145)
SODIUM SERPL-SCNC: 135 MMOL/L — SIGNIFICANT CHANGE UP (ref 135–145)
WBC # BLD: 6.74 K/UL — SIGNIFICANT CHANGE UP (ref 3.8–10.5)
WBC # BLD: 6.74 K/UL — SIGNIFICANT CHANGE UP (ref 3.8–10.5)
WBC # FLD AUTO: 6.74 K/UL — SIGNIFICANT CHANGE UP (ref 3.8–10.5)
WBC # FLD AUTO: 6.74 K/UL — SIGNIFICANT CHANGE UP (ref 3.8–10.5)

## 2023-11-30 PROCEDURE — 99222 1ST HOSP IP/OBS MODERATE 55: CPT

## 2023-11-30 PROCEDURE — 99232 SBSQ HOSP IP/OBS MODERATE 35: CPT | Mod: GC

## 2023-11-30 RX ORDER — SODIUM,POTASSIUM PHOSPHATES 278-250MG
1 POWDER IN PACKET (EA) ORAL ONCE
Refills: 0 | Status: COMPLETED | OUTPATIENT
Start: 2023-11-30 | End: 2023-11-30

## 2023-11-30 RX ADMIN — PANTOPRAZOLE SODIUM 40 MILLIGRAM(S): 20 TABLET, DELAYED RELEASE ORAL at 05:32

## 2023-11-30 RX ADMIN — GABAPENTIN 100 MILLIGRAM(S): 400 CAPSULE ORAL at 22:28

## 2023-11-30 RX ADMIN — HEPARIN SODIUM 5000 UNIT(S): 5000 INJECTION INTRAVENOUS; SUBCUTANEOUS at 22:28

## 2023-11-30 RX ADMIN — ALBUTEROL 2 PUFF(S): 90 AEROSOL, METERED ORAL at 13:12

## 2023-11-30 RX ADMIN — GABAPENTIN 100 MILLIGRAM(S): 400 CAPSULE ORAL at 13:13

## 2023-11-30 RX ADMIN — HEPARIN SODIUM 5000 UNIT(S): 5000 INJECTION INTRAVENOUS; SUBCUTANEOUS at 13:12

## 2023-11-30 RX ADMIN — HEPARIN SODIUM 5000 UNIT(S): 5000 INJECTION INTRAVENOUS; SUBCUTANEOUS at 05:31

## 2023-11-30 RX ADMIN — MONTELUKAST 10 MILLIGRAM(S): 4 TABLET, CHEWABLE ORAL at 13:13

## 2023-11-30 RX ADMIN — LOSARTAN POTASSIUM 100 MILLIGRAM(S): 100 TABLET, FILM COATED ORAL at 05:31

## 2023-11-30 RX ADMIN — Medication 1 PACKET(S): at 13:14

## 2023-11-30 RX ADMIN — AMLODIPINE BESYLATE 5 MILLIGRAM(S): 2.5 TABLET ORAL at 05:32

## 2023-11-30 RX ADMIN — ATORVASTATIN CALCIUM 20 MILLIGRAM(S): 80 TABLET, FILM COATED ORAL at 22:28

## 2023-11-30 RX ADMIN — INSULIN GLARGINE 10 UNIT(S): 100 INJECTION, SOLUTION SUBCUTANEOUS at 22:38

## 2023-11-30 RX ADMIN — Medication 1: at 09:06

## 2023-11-30 RX ADMIN — GABAPENTIN 100 MILLIGRAM(S): 400 CAPSULE ORAL at 05:31

## 2023-11-30 NOTE — CONSULT NOTE ADULT - SUBJECTIVE AND OBJECTIVE BOX
Reason For Consult: DM    HPI:  78F w/ pmh of hypertension, hyperlipidemia, diabetes on insulin, s/p  and AVINASH complaining of 2 days abdominal pain n/v. Pain is RLQ, associated with multiple episodes of large volume emesis. She is not passing flatus, last BM 2 days ago. She denies fevers/chills. Given resumed bowel function NGT was removed yesterday, however today she reports abdominal pain and one episode of emesis. AXR noted increased gas pattern and physical exam concerning for possible bowel obstruction. Patient with resumed bowel function and no further nausea, diet has been slowly advance with appropriate but minimal tolerance.   endocrine called for DM   a1c 8.3  pt has long standing hx of DM  she does not know her DM meds except for Lantus which she takes 60-66 units  she states she has poor po intake due to abdominal pain  pt is sad at the visit, crying and asking why she is going thru so much and if i can help her get to rehab  hard to have pt focus on meaningful DM hx   inpt pt has had hypoglycemia as well.  she has low po intake       PAST MEDICAL & SURGICAL HISTORY:  Diabetes mellitus      HTN (hypertension)      S/P AVINASH (total abdominal hysterectomy)      History of           FAMILY HISTORY:  unclear, pt unable to tell me if family has DM       Social History:  states all family members have moved away and she needs help at home         Outpatient Medications:  pt has unknown home meds     MEDICATIONS  (STANDING):  albuterol    90 MICROgram(s) HFA Inhaler 2 Puff(s) Inhalation daily  amLODIPine   Tablet 5 milliGRAM(s) Oral daily  atorvastatin 20 milliGRAM(s) Oral at bedtime  dextrose 5%. 1000 milliLiter(s) (50 mL/Hr) IV Continuous <Continuous>  dextrose 50% Injectable 25 Gram(s) IV Push once  dextrose 50% Injectable 12.5 Gram(s) IV Push once  dextrose 50% Injectable 25 Gram(s) IV Push once  gabapentin 100 milliGRAM(s) Oral three times a day  heparin   Injectable 5000 Unit(s) SubCutaneous every 8 hours  insulin glargine Injectable (LANTUS) 10 Unit(s) SubCutaneous at bedtime  insulin lispro (ADMELOG) corrective regimen sliding scale   SubCutaneous three times a day before meals  insulin lispro (ADMELOG) corrective regimen sliding scale   SubCutaneous at bedtime  losartan 100 milliGRAM(s) Oral daily  montelukast 10 milliGRAM(s) Oral daily  pantoprazole    Tablet 40 milliGRAM(s) Oral before breakfast    MEDICATIONS  (PRN):  dextrose Oral Gel 15 Gram(s) Oral once PRN Blood Glucose LESS THAN 70 milliGRAM(s)/deciliter      Allergies    penicillin (Rash)    Intolerances      Review of Systems:  Constitutional: No fever  Eyes: No blurry vision  Neuro: No tremors  HEENT: No pain  Cardiovascular: No chest pain, palpitations  Respiratory: No SOB, no cough  GI: ++nausea, vomiting, abdominal pain  : No dysuria  Skin: no rash  Psych: no depression  Endocrine: no polyuria, polydipsia  Hem/lymph: no swelling  Osteoporosis: no fractures    ALL OTHER SYSTEMS REVIEWED AND NEGATIVE        PHYSICAL EXAM:  VITALS: T(C): 36.8 (23 @ 13:20)  T(F): 98.2 (23 @ 13:20), Max: 99.5 (23 @ 17:27)  HR: 83 (23 @ 13:20) (77 - 100)  BP: 138/69 (23 @ 13:20) (138/69 - 174/70)  RR:  (15 - 18)  SpO2:  (96% - 100%)  Wt(kg): --  GENERAL: NAD, well-groomed, well-developed  EYES: No proptosis, no lid lag, anicteric  HEENT:  Atraumatic, Normocephalic, moist mucous membranes  RESPIRATORY: Clear to auscultation bilaterally; No rales, rhonchi, wheezing, or rubs  CARDIOVASCULAR: Regular rate and rhythm; No murmurs; no peripheral edema  GI: Soft, nontender, non distended, normal bowel sounds  SKIN: Dry, intact, No rashes or lesions  MUSCULOSKELETAL: Full range of motion, normal strength  NEURO: extraocular movements intact, no tremor,   PSYCH: Alert and oriented x 3,sad and tearful affect         POCT Blood Glucose.: 122 mg/dL (23 @ 12:08)  POCT Blood Glucose.: 156 mg/dL (23 @ 08:51)  POCT Blood Glucose.: 180 mg/dL (23 @ 22:07)  POCT Blood Glucose.: 173 mg/dL (23 @ 21:25)  POCT Blood Glucose.: 139 mg/dL (23 @ 17:17)  POCT Blood Glucose.: 104 mg/dL (23 @ 11:46)  POCT Blood Glucose.: 124 mg/dL (23 @ 08:23)  POCT Blood Glucose.: 101 mg/dL (23 @ 04:08)  POCT Blood Glucose.: 159 mg/dL (23 @ 21:50)  POCT Blood Glucose.: 155 mg/dL (23 @ 21:49)  POCT Blood Glucose.: 65 mg/dL (23 @ 21:29)  POCT Blood Glucose.: 67 mg/dL (23 @ 21:08)  POCT Blood Glucose.: 56 mg/dL (23 @ 21:06)  POCT Blood Glucose.: 83 mg/dL (23 @ 12:20)  POCT Blood Glucose.: 139 mg/dL (23 @ 09:01)  POCT Blood Glucose.: 228 mg/dL (23 @ 08:45)  POCT Blood Glucose.: 52 mg/dL (23 @ 08:26)  POCT Blood Glucose.: 46 mg/dL (23 @ 08:25)  POCT Blood Glucose.: 167 mg/dL (23 @ 21:30)  POCT Blood Glucose.: 140 mg/dL (23 @ 17:48)                            10.1   6.74  )-----------( 230      ( 2023 06:21 )             30.7           135  |  103  |  16  ----------------------------<  144<H>  4.7   |  23  |  1.01    eGFR: 57<L>    Ca    8.8        Mg     1.90       Phos  2.8             Thyroid Function Tests:              Radiology:

## 2023-11-30 NOTE — PROGRESS NOTE ADULT - ATTENDING COMMENTS
Patient with partial sbo that has now resolved  plan  dispo planning for rehab    I have personally interviewed and examined this patient, reviewed pertinent labs and imaging, and discussed the case with colleagues, residents, and physician assistants on B Team rounds.    The active care issues are:  1. partial sbo    The Acute Care Surgery (B Team) Attending Group Practice:  Dr. Damari Pedersen, Dr. William Sheriff, Dr. Iron Montelongo, Dr. Glenn Thomas,     urgent issues - spectra [id number]  nonurgent issues - [phone]  patient appointments or afterhours - [phone]

## 2023-11-30 NOTE — PROGRESS NOTE ADULT - SUBJECTIVE AND OBJECTIVE BOX
General Surgery Progress Note  No events on    Subjective: Patient resting in bed. Patient denies subjective fever/chills, CP, SOB, n/v, or abdominal pain. +Flatus/+BM. Pain well controlled, tolerating diet.    Objective:  Vitals:  T(C): 37.4 (11-30-23 @ 05:31), Max: 37.5 (11-29-23 @ 17:27)  HR: 77 (11-30-23 @ 05:31) (73 - 100)  BP: 169/62 (11-30-23 @ 05:31) (143/85 - 174/70)  RR: 18 (11-30-23 @ 05:31) (16 - 18)  SpO2: 98% (11-30-23 @ 05:31) (96% - 100%)  Wt(kg): --    11-29 @ 07:01  -  11-30 @ 07:00  --------------------------------------------------------  IN:    dextrose 5% + sodium chloride 0.9% w/ Additives: 600 mL    Oral Fluid: 1370 mL  Total IN: 1970 mL    OUT:  Total OUT: 0 mL    Total NET: 1970 mL          Physical Exam:  General Appearance: no acute distress, NTND   Chest: airway intact, non-labored breathing  CV: no JVD, palpable pulses b/l  Abdomen: soft, non-tender, non-distended, +BS   Extremities: [facility]       Labs:                        10.1   6.74  )-----------( 230      ( 30 Nov 2023 06:21 )             30.7     11-29    137  |  106  |  14  ----------------------------<  119<H>  4.9   |  22  |  1.04    Ca    8.5      29 Nov 2023 07:05  Phos  3.1     11-29  Mg     2.10     11-29          Urinalysis Basic - ( 29 Nov 2023 07:05 )    Color: x / Appearance: x / SG: x / pH: x  Gluc: 119 mg/dL / Ketone: x  / Bili: x / Urobili: x   Blood: x / Protein: x / Nitrite: x   Leuk Esterase: x / RBC: x / WBC x   Sq Epi: x / Non Sq Epi: x / Bacteria: x

## 2023-11-30 NOTE — CONSULT NOTE ADULT - ASSESSMENT
78F w/ pmh of hypertension, hyperlipidemia, diabetes on insulin, s/p  and AVINASH complaining of 2 days abdominal pain n/v with concern for SBO, with minimal po intake   endocrine called for DM   a1c 8.3          Poorly controlled T2DM with hyperglycemia  - HbA1c: 8  - Home Regimen:  need to confirm home medications and doses., pt states she is on lantus 60-66 units at night?  poor po intake inpt, with hypoglycemia noted, with decreasing doses of Lantus   PLAN  - Hold oral DM agents while inpatient  date:   - Lantus 10  units at bedtime. DO NOT HOLD IF NPO.  - Use low dose Admelog correction scale pre-meal  - Use low dose Admelog correction scale at bedtime  will add premeal insulin if taking consistent PO and hyperglyemia   - Fingerstick BG before meals and bedtime  - Goal -180  - Carbohydrate consistent diet  - RD consult    inform endocrine if hyper or hypoglycemia occur. please inform endocrine if changes in PO intake or diet or plan to be NPO as this will impact glycemic control and insulin will need adjustments     Discharge plan:  original home DM meds were:  - Discharge medications now will be as follows:  please note any changes. this needs to be reviewed with the pt prior to dc with [facility]back so that the pt understands what medictions have been modified and updated    dc meds:  1) confirm home meds   2) based on where she is going and her inpt requirements, we will determine her dc reccs  3) at this time final DM regimen TBD        HTN  PLAN  -currently on amlodpine and losartan  - Can check urine microalbumin outpatient  - Outpatient goal BP <130/80.    HLD  PLAN  - Continue atorvastatin 20mg daily per primary team/cardiology if no contraindications   needs lipid panel outpt         Carina Allison MD  Attending Physician   Department of Endocrinology, Diabetes and Metabolism     weekdays: 9am to 5pm: [facility] or [facility] and or TEAMS     Nights and weekends: [phone]  Please note that this patient may be followed by a different provider tomorrow.   If no answer or after hours, please contact [phone].  For final dc reccomendations, please call [phone] or page the endocrine fellow on call.   78F w/ pmh of hypertension, hyperlipidemia, diabetes on insulin, s/p  and AVINASH complaining of 2 days abdominal pain n/v with concern for SBO, with minimal po intake   endocrine called for DM   a1c 8.3          Poorly controlled T2DM with hyperglycemia  - HbA1c: 8  - Home Regimen:  need to confirm home medications and doses., pt states she is on lantus 60-66 units at night?  poor po intake inpt, with hypoglycemia noted, with decreasing doses of Lantus   PLAN  - Hold oral DM agents while inpatient  date:   - Lantus 10  units at bedtime. DO NOT HOLD IF NPO.  - Use low dose Admelog correction scale pre-meal  - Use low dose Admelog correction scale at bedtime  will add premeal insulin if taking consistent PO and hyperglyemia   - Fingerstick BG before meals and bedtime  - Goal -180  - Carbohydrate consistent diet  - RD consult    inform endocrine if hyper or hypoglycemia occur. please inform endocrine if changes in PO intake or diet or plan to be NPO as this will impact glycemic control and insulin will need adjustments     Discharge plan:  original home DM meds were:  - Discharge medications now will be as follows:  please note any changes. this needs to be reviewed with the pt prior to dc with [facility]back so that the pt understands what medictions have been modified and updated    dc meds:  1) confirm home meds   2) based on where she is going and her inpt requirements, we will determine her dc reccs  3) at this time final DM regimen TBD        HTN  PLAN  -currently on amlodpine and losartan  - Can check urine microalbumin outpatient  - Outpatient goal BP <130/80.    HLD  PLAN  - Continue atorvastatin 20mg daily per primary team/cardiology if no contraindications   needs lipid panel outpt         HCM  please have SW/case management see pt given tearful and sad affect. pt very concerned about dc planning and asking for help as she states she can not take care of herself     Carina Allison MD  Attending Physician   Department of Endocrinology, Diabetes and Metabolism     weekdays: 9am to 5pm: [facility] or [facility] and or TEAMS     Nights and weekends: [phone]  Please note that this patient may be followed by a different provider tomorrow.   If no answer or after hours, please contact [phone].  For final dc reccomendations, please call [phone] or page the endocrine fellow on call.

## 2023-11-30 NOTE — PROGRESS NOTE ADULT - ASSESSMENT
78F w/ pmh of hypertension, hyperlipidemia, diabetes on insulin, s/p  and AVINASH complaining of 2 days abdominal pain n/v. Pain is RLQ, associated with multiple episodes of large volume emesis. She is not passing flatus, last BM 2 days ago. She denies fevers/chills. Given resumed bowel function NGT was removed yesterday, however today she reports abdominal pain and one episode of emesis. AXR noted increased gas pattern and physical exam concerning for possible bowel obstruction. Patient with resumed bowel function and no further nausea, diet has been slowly advance with appropriate but minimal tolerance.     Plan:  - regular diet CCd  - f/u endocrine recs  - Follow abdominal function  - Transition of meds to PO  - GYN consult for vaginal stump prolapse with mucosal laceration  - DVTppx    B team surgery [id number]

## 2023-12-01 DIAGNOSIS — E78.5 HYPERLIPIDEMIA, UNSPECIFIED: ICD-10-CM

## 2023-12-01 DIAGNOSIS — I10 ESSENTIAL (PRIMARY) HYPERTENSION: ICD-10-CM

## 2023-12-01 DIAGNOSIS — E11.9 TYPE 2 DIABETES MELLITUS WITHOUT COMPLICATIONS: ICD-10-CM

## 2023-12-01 LAB
GLUCOSE BLDC GLUCOMTR-MCNC: 136 MG/DL — HIGH (ref 70–99)
GLUCOSE BLDC GLUCOMTR-MCNC: 136 MG/DL — HIGH (ref 70–99)
GLUCOSE BLDC GLUCOMTR-MCNC: 202 MG/DL — HIGH (ref 70–99)
GLUCOSE BLDC GLUCOMTR-MCNC: 202 MG/DL — HIGH (ref 70–99)
GLUCOSE BLDC GLUCOMTR-MCNC: 96 MG/DL — SIGNIFICANT CHANGE UP (ref 70–99)
GLUCOSE BLDC GLUCOMTR-MCNC: 96 MG/DL — SIGNIFICANT CHANGE UP (ref 70–99)
GLUCOSE BLDC GLUCOMTR-MCNC: 98 MG/DL — SIGNIFICANT CHANGE UP (ref 70–99)
GLUCOSE BLDC GLUCOMTR-MCNC: 98 MG/DL — SIGNIFICANT CHANGE UP (ref 70–99)

## 2023-12-01 PROCEDURE — 99232 SBSQ HOSP IP/OBS MODERATE 35: CPT | Mod: GC

## 2023-12-01 PROCEDURE — 99232 SBSQ HOSP IP/OBS MODERATE 35: CPT

## 2023-12-01 RX ORDER — ACETAMINOPHEN 500 MG
975 TABLET ORAL EVERY 6 HOURS
Refills: 0 | Status: DISCONTINUED | OUTPATIENT
Start: 2023-12-01 | End: 2023-12-05

## 2023-12-01 RX ADMIN — Medication 975 MILLIGRAM(S): at 17:50

## 2023-12-01 RX ADMIN — INSULIN GLARGINE 10 UNIT(S): 100 INJECTION, SOLUTION SUBCUTANEOUS at 21:57

## 2023-12-01 RX ADMIN — Medication 975 MILLIGRAM(S): at 18:20

## 2023-12-01 RX ADMIN — HEPARIN SODIUM 5000 UNIT(S): 5000 INJECTION INTRAVENOUS; SUBCUTANEOUS at 05:58

## 2023-12-01 RX ADMIN — Medication 2: at 13:02

## 2023-12-01 RX ADMIN — HEPARIN SODIUM 5000 UNIT(S): 5000 INJECTION INTRAVENOUS; SUBCUTANEOUS at 13:03

## 2023-12-01 RX ADMIN — GABAPENTIN 100 MILLIGRAM(S): 400 CAPSULE ORAL at 05:58

## 2023-12-01 RX ADMIN — MONTELUKAST 10 MILLIGRAM(S): 4 TABLET, CHEWABLE ORAL at 11:55

## 2023-12-01 RX ADMIN — GABAPENTIN 100 MILLIGRAM(S): 400 CAPSULE ORAL at 21:58

## 2023-12-01 RX ADMIN — GABAPENTIN 100 MILLIGRAM(S): 400 CAPSULE ORAL at 13:04

## 2023-12-01 RX ADMIN — LOSARTAN POTASSIUM 100 MILLIGRAM(S): 100 TABLET, FILM COATED ORAL at 05:58

## 2023-12-01 RX ADMIN — ATORVASTATIN CALCIUM 20 MILLIGRAM(S): 80 TABLET, FILM COATED ORAL at 21:58

## 2023-12-01 RX ADMIN — HEPARIN SODIUM 5000 UNIT(S): 5000 INJECTION INTRAVENOUS; SUBCUTANEOUS at 21:57

## 2023-12-01 RX ADMIN — PANTOPRAZOLE SODIUM 40 MILLIGRAM(S): 20 TABLET, DELAYED RELEASE ORAL at 06:00

## 2023-12-01 RX ADMIN — ALBUTEROL 2 PUFF(S): 90 AEROSOL, METERED ORAL at 11:54

## 2023-12-01 RX ADMIN — AMLODIPINE BESYLATE 5 MILLIGRAM(S): 2.5 TABLET ORAL at 05:58

## 2023-12-01 NOTE — DIETITIAN INITIAL EVALUATION ADULT - NSFNSGIIOFT_GEN_A_CORE
11-30-23 @ 07:01  -  12-01-23 @ 07:00  --------------------------------------------------------  OUT:    Stool (mL): 1 mL  Total OUT: 1 mL    Total NET: -1 mL      12-01-23 @ 07:01  -  12-01-23 @ 15:46  --------------------------------------------------------  OUT:    Stool (mL): 1 mL  Total OUT: 1 mL    Total NET: -1 mL

## 2023-12-01 NOTE — DIETITIAN INITIAL EVALUATION ADULT - NUTRITIONGOAL OUTCOME2
Check here if all serologies below were negative, non-reactive or immune. Otherwise select appropriate status. Patient will follow a consistent carbohydrate dietary pattern to promote euglycemia.

## 2023-12-01 NOTE — DIETITIAN INITIAL EVALUATION ADULT - OTHER INFO
Patient is currently ordered for a PO diet, advanced from a clear liquid diet on 11/29/23. Patient did not report on appetite during course of admission. Documented on RN flowsheet as consuming % of meals. Patient denies any chewing or swallowing difficulty with current diet order. Patient complains of diarrhea, ongoing x4 days. Denies any nausea, vomiting, constipation. Last BM 11/30/23 per RN flowsheet documentation. Not noted to be on a bowel regimen. Noted HbA1c 8.3% (11/23/23).    Nutrition education not appropriate at this time secondary to patient's emotional state. Will re-attempt as feasible upon nutrition follow-up assessment, as per protocol. Patient is currently ordered for a PO diet, advanced from a clear liquid diet on 11/29/23. Patient did not report on appetite during course of admission. Documented on RN flowsheet as consuming % of meals. Patient denies any chewing or swallowing difficulty with current diet order. Patient complains of diarrhea, ongoing x4 days. Denies any nausea, vomiting, constipation. Last BM 11/30/23 per RN flowsheet documentation. Not noted to be on a bowel regimen. Noted HbA1c 8.3% (11/23/23).    Verbal nutrition education not appropriate at this time secondary to patient's emotional state, early termination of interview. Writer provided printed handout regarding Carbohydrate Counting for People with Diabetes, left at bedside. Will re-attempt verbal education as feasible upon nutrition follow-up assessment, as per protocol.

## 2023-12-01 NOTE — PROGRESS NOTE ADULT - SUBJECTIVE AND OBJECTIVE BOX
B TEAM SURGERY DAILY PROGRESS NOTE:     INTERVAL EVENTS: None    SUBJECTIVE/ROS: No acute events overnight. Patient seen and examined at bedside by surgical team. Reports appropriate bowel function with adequate po tolerance, denies N/V, fever/chills, sob/chest pain.     OBJECTIVE:  Vital Signs Last 24 Hrs  T(C): 36.9 (01 Dec 2023 05:52), Max: 37.4 (30 Nov 2023 23:55)  T(F): 98.4 (01 Dec 2023 05:52), Max: 99.3 (30 Nov 2023 23:55)  HR: 80 (01 Dec 2023 05:52) (75 - 88)  BP: 154/59 (01 Dec 2023 05:52) (138/69 - 177/67)  BP(mean): --  RR: 18 (01 Dec 2023 05:52) (15 - 19)  SpO2: 99% (01 Dec 2023 05:52) (96% - 100%)    Parameters below as of 01 Dec 2023 05:52  Patient On (Oxygen Delivery Method): room air                            10.1   6.74  )-----------( 230      ( 30 Nov 2023 06:21 )             30.7     11-30    135  |  103  |  16  ----------------------------<  144<H>  4.7   |  23  |  1.01    Ca    8.8      30 Nov 2023 06:21  Phos  2.8     11-30  Mg     1.90     11-30       I&O's Detail    30 Nov 2023 07:01  -  01 Dec 2023 07:00  --------------------------------------------------------  IN:    Oral Fluid: 637 mL  Total IN: 637 mL    OUT:    Stool (mL): 1 mL  Total OUT: 1 mL    Total NET: 636 mL          IMAGING:      PHYSICAL EXAM:  Constitutional: NAD  Respiratory: non-labored breathing, patent airway  Gastrointestinal: abdomen soft, nontender, nondistended  Extremities: warm  Neurological: intact           HISTORY OF PRESENT ILLNESS:  Patient is a 21-year-old single  woman, currently unemployed, used to study at [employer] but out of school for a semester, with a history depression and anxiety since age 13, she is being referred by her psychotherapist Sue Almanza for a psychiatric evaluation.    Patient was previously treated by Dr. Neal Leblanc but she discontinued the treatment one and half years ago \"because I was doing better\".  She was treated by Dr. Neal Leblanc for 2 years.   She had been successfully treated with Wellbutrin  mg daily, Zoloft 75 mg daily, and Seroquel 25 mg daily, and was also on clonazepam 0.5 mg twice daily as needed but his medication was discontinued by her previous psychiatrist. Patient did not experience any side effects with his medications except for weight gain of 10-15 pounds.    For the past 6 months she has been experiencing worsening of depressed mood, irritability, sense of hopelessness and worthlessness, frequent crying spells, decreased energy motivation, hypersomnia, sleeping 12-15 hours a day and social withdrawal. However her weight and appetite have been stable.    Patient states these depressive symptoms are alternated with episodes of increased irritability, anger, crying, severe insomnia, some nights sleeping only 1-2 hours, increased energy, \"the random racing thoughts\", talkativeness, impulsive cleaning and cooking \"in the middle of night\", impulsive spending, increased in sexual libido, lasting for several days but less than a week.    She also reports history of anxiety since age 14. \"I worry a lot\", about school, family, health, money and future.  In the past she used to experience panic attacks. When very anxious she experiences shortness of breath, palpitation, muscle tension and tremor.  She denies any history of OCD symptoms.    Current stressors:  Late March as she'll be moving to [place] with her mother and her stepfather because of his job transfer. The  only reason she has to move with her parents is her financial situation. She has a twin sister who lives in [facility] and an older sister who lives in [place] with her 2 children. \"I really would like to stay here\".    PAST PSYCHIATRIC HISTORY:  History depression since age 14 after her parents divorce. History of anxiety.age  Her teen-15.  Patient had 2 psychiatric hospitalizations in 2015 when she had a suicidal attempt by an overdose with \"pills and a bottle of vodka\".  She denies any history of aggressive behaviors to others or legal issues.    AODA HISTORY:  She denies any history of alcohol or drug abuse issues, has never smoked cigarettes before.      Past Medical History   Diagnosis Date   • Anxiety    • Depressive disorder    • Episodic mood disorder     previously she had a \"seizure-like episode\", was admitted for it, however EEG was normal.     Patient Active Problem List   Diagnosis   • Episodic mood disorder   • Anxiety       Current Outpatient Prescriptions   Medication Sig Dispense Refill   • sertraline (ZOLOFT) 50 MG tablet Take 1.5 tab qam 45 tablet 2   • QUEtiapine (SEROQUEL) 25 MG tablet 25 mg qhs 30 tablet 2   • buPROPion (WELLBUTRIN XL) 150 MG 24 hr tablet Indications: depression 150 mg qam 30 tablet 2   • Etonogestrel 68 MG IMPL Inject  into the skin. Implanted 12/2014   Indications: Pregnancy       No current facility-administered medications for this visit.        ALLERGIES:  No Known Allergies      FAMILY HISTORY:  A maternal aunt with some sort of psychiatric condition has committed suicide.    PSYCHOSOCIAL HISTORY:  Patient was born in [facility] [place], moved to [place] at age 16. Parents  when she was 14 years old.   there was a lot of conflict between parents after the divorce. Mother used to have primary placement. Father moved to [place], was close to him but he distanced from the patient because he did not agree with her psychiatric treatment.  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, unfortunately her credits will not be transferred to [place].  Her boyfriend of 1 year lives in [place]. As stated above patient has one twin sister who lives in [facility] and her older sister here in [place].    MENTAL STATUS EXAM:  Patient is a 21-year-old  woman, well groomed, alert, and oriented ×3. Gait is stable. Speech normal. Patient is presenting with a might fidgetiness. She is cooperative and pleasant. Her affect is euthymic and appropriate but anxious. Thought processes are logical and goal directed. She denies experiencing any psychotic symptoms, she also denies any suicidal or homicidal ideations or plans. Memory is grossly intact, insight and judgment are good    PSYCHIATRIC DIAGNOSIS: Episodic mood disorder (rule out bipolar 2 disorder) and anxiety.    TREATMENT PLAN:  According to the patient her previous medication regimen did help with her depressive symptoms and mood lability. She would like to restart the same psychotropic medications, however at a lower dose. Patient will be restarting on Wellbutrin  mg daily and Seroquel 25 mg 1 tablet bedtime. She'll be restarting Zoloft however at a lower dose, 50 mg 1/2 tablet daily for a week, then 1 tablet daily. Side effects and risks and benefits of these medications have been thoroughly explained to the patient increased risk of suicide and more frequent cycling with antidepressants, increased risk of seizures with Wellbutrin, and risk of weight gain, metabolic syndrome and EPS with Seroquel, she has signed informed consent for medication treatment. Patient to monitor her mood symptoms closely and to call this writer if needed. She will be returning to clinic for another follow-up visit in one month.

## 2023-12-01 NOTE — DIETITIAN INITIAL EVALUATION ADULT - PERTINENT MEDS FT
MEDICATIONS  (STANDING):  albuterol    90 MICROgram(s) HFA Inhaler 2 Puff(s) Inhalation daily  amLODIPine   Tablet 5 milliGRAM(s) Oral daily  atorvastatin 20 milliGRAM(s) Oral at bedtime  dextrose 5%. 1000 milliLiter(s) (50 mL/Hr) IV Continuous <Continuous>  dextrose 50% Injectable 12.5 Gram(s) IV Push once  dextrose 50% Injectable 25 Gram(s) IV Push once  dextrose 50% Injectable 25 Gram(s) IV Push once  gabapentin 100 milliGRAM(s) Oral three times a day  heparin   Injectable 5000 Unit(s) SubCutaneous every 8 hours  insulin glargine Injectable (LANTUS) 10 Unit(s) SubCutaneous at bedtime  insulin lispro (ADMELOG) corrective regimen sliding scale   SubCutaneous three times a day before meals  insulin lispro (ADMELOG) corrective regimen sliding scale   SubCutaneous at bedtime  losartan 100 milliGRAM(s) Oral daily  montelukast 10 milliGRAM(s) Oral daily  pantoprazole    Tablet 40 milliGRAM(s) Oral before breakfast    MEDICATIONS  (PRN):  dextrose Oral Gel 15 Gram(s) Oral once PRN Blood Glucose LESS THAN 70 milliGRAM(s)/deciliter

## 2023-12-01 NOTE — DIETITIAN INITIAL EVALUATION ADULT - REASON FOR ADMISSION
Intestinal obstruction    Patient is a 78y Female with PMH diabetes mellitus, HTN who presented to [facility] complaining of 2 days abdominal pain, nausea, and vomiting. Pain is RLQ, associated with multiple episodes of large volume emesis. She is not passing flatus, last BM 2 days ago. Patient now with resumed bowel function and no further nausea, diet has been slowly advance with appropriate but minimal tolerance.

## 2023-12-01 NOTE — DIETITIAN NUTRITION RISK NOTIFICATION - ADDITIONAL COMMENTS/DIETITIAN RECOMMENDATIONS
Please see Dietitian Initial Assessment for complete recommendations.      Nicole Caldwell MS RDN CDN  On [employer] Teams, Pager #[phone]

## 2023-12-01 NOTE — DIETITIAN INITIAL EVALUATION ADULT - PERTINENT LABORATORY DATA
11-30    135  |  103  |  16  ----------------------------<  144<H>  4.7   |  23  |  1.01    Ca    8.8      30 Nov 2023 06:21  Phos  2.8     11-30  Mg     1.90     11-30    POCT Blood Glucose.: 202 mg/dL (12-01-23 @ 12:25)  A1C with Estimated Average Glucose Result: 8.3 % (11-23-23 @ 16:12)

## 2023-12-01 NOTE — PROGRESS NOTE ADULT - ASSESSMENT
78F w/ pmh of hypertension, hyperlipidemia, diabetes on insulin, s/p  and AVINASH complaining of 2 days abdominal pain n/v. Pain is RLQ, associated with multiple episodes of large volume emesis. She is not passing flatus, last BM 2 days ago. She denies fevers/chills. Given resumed bowel function NGT was removed yesterday, however today she reports abdominal pain and one episode of emesis. AXR noted increased gas pattern and physical exam concerning for possible bowel obstruction. Patient with resumed bowel function and no further nausea, diet has been slowly advance with appropriate but minimal tolerance.     Plan:  - Regular diet  - f/u endocrine recs  - Follow abdominal function  - PO meds  - Appreciate GYN recs  - DVTppx  - Dispo: Rehab    B team surgery [id number]

## 2023-12-01 NOTE — PROGRESS NOTE ADULT - SUBJECTIVE AND OBJECTIVE BOX
Chief Complaint: DM 2 uncontrolled with hyperglycemia     History: Saw patient at bedside. She reports taking Lantus 60 units at home, managed by PCP. She reports she is able to self injection insulin via Pen at home but she is unable to check fingersticks due to dexterity issues. She is currently only on Lantus 10 units at night along with Admelog low correctional scales only.     MEDICATIONS  (STANDING):  albuterol    90 MICROgram(s) HFA Inhaler 2 Puff(s) Inhalation daily  amLODIPine   Tablet 5 milliGRAM(s) Oral daily  atorvastatin 20 milliGRAM(s) Oral at bedtime  dextrose 5%. 1000 milliLiter(s) (50 mL/Hr) IV Continuous <Continuous>  dextrose 50% Injectable 12.5 Gram(s) IV Push once  dextrose 50% Injectable 25 Gram(s) IV Push once  dextrose 50% Injectable 25 Gram(s) IV Push once  gabapentin 100 milliGRAM(s) Oral three times a day  heparin   Injectable 5000 Unit(s) SubCutaneous every 8 hours  insulin glargine Injectable (LANTUS) 10 Unit(s) SubCutaneous at bedtime  insulin lispro (ADMELOG) corrective regimen sliding scale   SubCutaneous three times a day before meals  insulin lispro (ADMELOG) corrective regimen sliding scale   SubCutaneous at bedtime  losartan 100 milliGRAM(s) Oral daily  montelukast 10 milliGRAM(s) Oral daily  pantoprazole    Tablet 40 milliGRAM(s) Oral before breakfast    MEDICATIONS  (PRN):  dextrose Oral Gel 15 Gram(s) Oral once PRN Blood Glucose LESS THAN 70 milliGRAM(s)/deciliter      Allergies    penicillin (Rash)      Review of Systems:  Constitutional: No fever  Cardiovascular: No chest pain, palpitations  Respiratory: No SOB, no cough  GI: No nausea, vomiting, abdominal pain  Endocrine: no polyuria, polydipsia      PHYSICAL EXAM:  VITALS: T(C): 36.8 (12-01-23 @ 14:10)  T(F): 98.2 (12-01-23 @ 14:10), Max: 99.3 (11-30-23 @ 23:55)  HR: 74 (12-01-23 @ 14:10) (74 - 88)  BP: 125/59 (12-01-23 @ 14:10) (125/59 - 177/67)  RR:  (17 - 19)  SpO2:  (96% - 100%)  Wt(kg): --  GENERAL: NAD, laying in bed  EYES: No proptosis, no lid lag, anicteric  RESPIRATORY: Non labored respirations   CARDIOVASCULAR: B/L LE edema   GI: Soft, nontender, non distended  NEURO: extraocular movements intact, no tremor  PSYCH: Alert and oriented x 3, normal affect, normal mood      CAPILLARY BLOOD GLUCOSE      POCT Blood Glucose.: 202 mg/dL (01 Dec 2023 12:25)  POCT Blood Glucose.: 136 mg/dL (01 Dec 2023 08:28)  POCT Blood Glucose.: 162 mg/dL (30 Nov 2023 22:37)  POCT Blood Glucose.: 98 mg/dL (30 Nov 2023 17:49)      11-30    135  |  103  |  16  ----------------------------<  144<H>  4.7   |  23  |  1.01    eGFR: 57<L>    Ca    8.8      11-30  Mg     1.90     11-30  Phos  2.8     11-30          A1C with Estimated Average Glucose Result: 8.3 % (11-23-23 @ 16:12)          Diet, Regular:   Consistent Carbohydrate Evening Snack (CSTCHOSN) (11-29-23 @ 08:16)

## 2023-12-01 NOTE — PROGRESS NOTE ADULT - ATTENDING COMMENTS
Patient with resolved SBO.   awaiting rehab placement    I have personally interviewed and examined this patient, reviewed pertinent labs and imaging, and discussed the case with colleagues, residents, and physician assistants on B Team rounds.    The active care issues are:  1. partial sbo    The Acute Care Surgery (B Team) Attending Group Practice:  Dr. Damari Pedersen, Dr. William Sheriff, Dr. Iron Montelongo, Dr. Glenn Thomas,     urgent issues - spectra [id number]  nonurgent issues - [phone]  patient appointments or afterhours - [phone]

## 2023-12-01 NOTE — DIETITIAN INITIAL EVALUATION ADULT - ORAL INTAKE PTA/DIET HISTORY
Patient seen at bedside this AM by writer. Patient noted to be tangential throughout interaction, unable to be redirected despite writer's attempts. Limited information obtained as a result. Patient reports no known food allergies or food intolerances. Does not consume pork (adjusted via CBORD). Patient denies any chewing or swallowing difficulty with regular solids and thin liquids. Patient reports her diet typically consists of vegetables, fish, turkey, chicken, fruits, and grains. Reports a decreased appetite, however did not elaborate despite probing. At this point, patient became emotional, crying; interview terminated as per patient request.    Per Guy RIOS, noted the following weight history: 97.5kg (11/24), 101.7kg (10/18), 108kg (7/27)  Objective weight measurements suggest 10.5kg (10%) x4 months period of time (significant)

## 2023-12-01 NOTE — DIETITIAN INITIAL EVALUATION ADULT - REASON INDICATOR FOR ASSESSMENT
Nutrition Risk Screen for Length Of Stay  Nutrition Risk Screen for Length Of Stay   Nutrition Consult for Uncontrolled Diabetes, lives alone

## 2023-12-01 NOTE — DIETITIAN INITIAL EVALUATION ADULT - OTHER CALCULATIONS
IBW: 120 lb +/- 10%, %%  Upper range ideal body weight 132lb/60kg used to determine estimated energy needs

## 2023-12-01 NOTE — PROGRESS NOTE ADULT - ASSESSMENT
Assessment and Recommendation:   · Assessment	  78F w/ pmh of hypertension, hyperlipidemia, diabetes on insulin, s/p  and AVINASH complaining of 2 days abdominal pain n/v with concern for SBO, with minimal po intake   endocrine called for DM   a1c 8.3    Poorly controlled T2DM with hyperglycemia  - HbA1c: 8  - Home Regimen:  Please confirm home medications and doses: patient reports Lantus 60 units at bedtime however, if only requiring 10 units at this time   Recent hypoglycemia on this admission    PLAN  - Consider internal medicine consult: this patient reports living alone with decreased dexterity to manage diabetes. She has other medical problems and discharge planning concerns for safety.     While inpatient:  - Continue Lantus 10  units at bedtime. DO NOT HOLD IF NPO.  - Continue low dose Admelog correction scale pre-meal  - Continue low dose Admelog correction scale at bedtime  - Fingerstick BG before meals and bedtime  - Goal -180  - Carbohydrate consistent diet  - RD consult ordered      Discharge plan:    1) confirm home meds     2) Planning based on where she is going and her inpatient requirements.     Likely to continue basal insulin but concerns for safety at home. She is requiring significantly less than home reported basal insulin dose. Clinical pharmacist to see patient Monday to assess insulin pen technique and dexterity     3) Case management and social work should see this patient. She reports living alone, being uncomfortable to return to living alone. Defer to primary team to coordinate consult.     4) Patient would benefit from a CGM as outpatient but unclear is she can manage placement independently       HTN  PLAN  - currently on amlodipine and losartan  - Can check urine microalbumin outpatient  - Outpatient goal BP <130/80  - management as per primary team     HLD  PLAN  - Continue atorvastatin 20mg daily per primary team/cardiology if no contraindications   - needs lipid panel outpt     GLADYS Ordaz-BC  Nurse Practitioner   Division of Endocrinology  Pager: SUKI pager [phone]    If out of hospital/unavailable when paged, please note: patient will be cared for by another provider on the endocrine service.  Please call the endocrine answering service for assistance to reach covering provider ([phone]). For non-urgent matters, please email [email or web address] for assistance.              Assessment and Recommendation:   · Assessment	  78F w/ pmh of hypertension, hyperlipidemia, diabetes on insulin, s/p  and AVINASH complaining of 2 days abdominal pain n/v with concern for SBO, with minimal po intake   endocrine called for DM   a1c 8.3    Poorly controlled T2DM with hyperglycemia  - HbA1c: 8  - Home Regimen:  Please confirm home medications and doses: patient reports Lantus 60 units at bedtime however, if only requiring 10 units at this time   Recent hypoglycemia on this admission    PLAN  - Consider internal medicine consult for medical optimization: this patient reports living alone with decreased dexterity to manage diabetes. She has other medical problems and discharge planning concerns for safety.     While inpatient:  - Continue Lantus 10  units at bedtime. DO NOT HOLD IF NPO.  - Continue low dose Admelog correction scale pre-meal  - Continue low dose Admelog correction scale at bedtime  - Fingerstick BG before meals and bedtime  - Goal -180  - Carbohydrate consistent diet  - RD consult ordered      Discharge plan:    1) confirm home meds     2) Planning based on where she is going and her inpatient requirements.     Likely to continue basal insulin but concerns for safety at home. She is requiring significantly less than home reported basal insulin dose. Clinical pharmacist to see patient Monday to assess insulin pen technique and dexterity     3) Case management and social work should see this patient. She reports living alone, being uncomfortable to return to living alone. Defer to primary team to coordinate consult.     4) Patient would benefit from a CGM as outpatient but unclear is she can manage placement independently       HTN  PLAN  - currently on amlodipine and losartan  - Can check urine microalbumin outpatient  - Outpatient goal BP <130/80  - management as per primary team     HLD  PLAN  - Continue atorvastatin 20mg daily per primary team/cardiology if no contraindications   - needs lipid panel outpt     GLADYS Ordaz-BC  Nurse Practitioner   Division of Endocrinology  Pager: SUKI pager [phone]    If out of hospital/unavailable when paged, please note: patient will be cared for by another provider on the endocrine service.  Please call the endocrine answering service for assistance to reach covering provider ([phone]). For non-urgent matters, please email [email or web address] for assistance.

## 2023-12-01 NOTE — DIETITIAN INITIAL EVALUATION ADULT - ORAL NUTRITION SUPPLEMENTS
Consider Glucerna Shake (provides 220kcal, 10gms protein per serving) once daily to support nutrition needs

## 2023-12-02 LAB
ANION GAP SERPL CALC-SCNC: 10 MMOL/L — SIGNIFICANT CHANGE UP (ref 7–14)
ANION GAP SERPL CALC-SCNC: 10 MMOL/L — SIGNIFICANT CHANGE UP (ref 7–14)
BUN SERPL-MCNC: 16 MG/DL — SIGNIFICANT CHANGE UP (ref 7–23)
BUN SERPL-MCNC: 16 MG/DL — SIGNIFICANT CHANGE UP (ref 7–23)
CALCIUM SERPL-MCNC: 8.6 MG/DL — SIGNIFICANT CHANGE UP (ref 8.4–10.5)
CALCIUM SERPL-MCNC: 8.6 MG/DL — SIGNIFICANT CHANGE UP (ref 8.4–10.5)
CHLORIDE SERPL-SCNC: 105 MMOL/L — SIGNIFICANT CHANGE UP (ref 98–107)
CHLORIDE SERPL-SCNC: 105 MMOL/L — SIGNIFICANT CHANGE UP (ref 98–107)
CO2 SERPL-SCNC: 21 MMOL/L — LOW (ref 22–31)
CO2 SERPL-SCNC: 21 MMOL/L — LOW (ref 22–31)
CREAT SERPL-MCNC: 0.9 MG/DL — SIGNIFICANT CHANGE UP (ref 0.5–1.3)
CREAT SERPL-MCNC: 0.9 MG/DL — SIGNIFICANT CHANGE UP (ref 0.5–1.3)
EGFR: 66 ML/MIN/1.73M2 — SIGNIFICANT CHANGE UP
EGFR: 66 ML/MIN/1.73M2 — SIGNIFICANT CHANGE UP
GLUCOSE BLDC GLUCOMTR-MCNC: 116 MG/DL — HIGH (ref 70–99)
GLUCOSE BLDC GLUCOMTR-MCNC: 116 MG/DL — HIGH (ref 70–99)
GLUCOSE BLDC GLUCOMTR-MCNC: 128 MG/DL — HIGH (ref 70–99)
GLUCOSE BLDC GLUCOMTR-MCNC: 128 MG/DL — HIGH (ref 70–99)
GLUCOSE BLDC GLUCOMTR-MCNC: 178 MG/DL — HIGH (ref 70–99)
GLUCOSE BLDC GLUCOMTR-MCNC: 178 MG/DL — HIGH (ref 70–99)
GLUCOSE BLDC GLUCOMTR-MCNC: 94 MG/DL — SIGNIFICANT CHANGE UP (ref 70–99)
GLUCOSE BLDC GLUCOMTR-MCNC: 94 MG/DL — SIGNIFICANT CHANGE UP (ref 70–99)
GLUCOSE SERPL-MCNC: 121 MG/DL — HIGH (ref 70–99)
GLUCOSE SERPL-MCNC: 121 MG/DL — HIGH (ref 70–99)
HCT VFR BLD CALC: 27.8 % — LOW (ref 34.5–45)
HCT VFR BLD CALC: 27.8 % — LOW (ref 34.5–45)
HGB BLD-MCNC: 8.8 G/DL — LOW (ref 11.5–15.5)
HGB BLD-MCNC: 8.8 G/DL — LOW (ref 11.5–15.5)
MAGNESIUM SERPL-MCNC: 1.7 MG/DL — SIGNIFICANT CHANGE UP (ref 1.6–2.6)
MAGNESIUM SERPL-MCNC: 1.7 MG/DL — SIGNIFICANT CHANGE UP (ref 1.6–2.6)
MCHC RBC-ENTMCNC: 30.1 PG — SIGNIFICANT CHANGE UP (ref 27–34)
MCHC RBC-ENTMCNC: 30.1 PG — SIGNIFICANT CHANGE UP (ref 27–34)
MCHC RBC-ENTMCNC: 31.7 GM/DL — LOW (ref 32–36)
MCHC RBC-ENTMCNC: 31.7 GM/DL — LOW (ref 32–36)
MCV RBC AUTO: 95.2 FL — SIGNIFICANT CHANGE UP (ref 80–100)
MCV RBC AUTO: 95.2 FL — SIGNIFICANT CHANGE UP (ref 80–100)
NRBC # BLD: 0 /100 WBCS — SIGNIFICANT CHANGE UP (ref 0–0)
NRBC # BLD: 0 /100 WBCS — SIGNIFICANT CHANGE UP (ref 0–0)
NRBC # FLD: 0 K/UL — SIGNIFICANT CHANGE UP (ref 0–0)
NRBC # FLD: 0 K/UL — SIGNIFICANT CHANGE UP (ref 0–0)
PHOSPHATE SERPL-MCNC: 2.8 MG/DL — SIGNIFICANT CHANGE UP (ref 2.5–4.5)
PHOSPHATE SERPL-MCNC: 2.8 MG/DL — SIGNIFICANT CHANGE UP (ref 2.5–4.5)
PLATELET # BLD AUTO: 257 K/UL — SIGNIFICANT CHANGE UP (ref 150–400)
PLATELET # BLD AUTO: 257 K/UL — SIGNIFICANT CHANGE UP (ref 150–400)
POTASSIUM SERPL-MCNC: 4.5 MMOL/L — SIGNIFICANT CHANGE UP (ref 3.5–5.3)
POTASSIUM SERPL-MCNC: 4.5 MMOL/L — SIGNIFICANT CHANGE UP (ref 3.5–5.3)
POTASSIUM SERPL-SCNC: 4.5 MMOL/L — SIGNIFICANT CHANGE UP (ref 3.5–5.3)
POTASSIUM SERPL-SCNC: 4.5 MMOL/L — SIGNIFICANT CHANGE UP (ref 3.5–5.3)
RBC # BLD: 2.92 M/UL — LOW (ref 3.8–5.2)
RBC # BLD: 2.92 M/UL — LOW (ref 3.8–5.2)
RBC # FLD: 13 % — SIGNIFICANT CHANGE UP (ref 10.3–14.5)
RBC # FLD: 13 % — SIGNIFICANT CHANGE UP (ref 10.3–14.5)
SODIUM SERPL-SCNC: 136 MMOL/L — SIGNIFICANT CHANGE UP (ref 135–145)
SODIUM SERPL-SCNC: 136 MMOL/L — SIGNIFICANT CHANGE UP (ref 135–145)
WBC # BLD: 4.99 K/UL — SIGNIFICANT CHANGE UP (ref 3.8–10.5)
WBC # BLD: 4.99 K/UL — SIGNIFICANT CHANGE UP (ref 3.8–10.5)
WBC # FLD AUTO: 4.99 K/UL — SIGNIFICANT CHANGE UP (ref 3.8–10.5)
WBC # FLD AUTO: 4.99 K/UL — SIGNIFICANT CHANGE UP (ref 3.8–10.5)

## 2023-12-02 PROCEDURE — 99232 SBSQ HOSP IP/OBS MODERATE 35: CPT | Mod: GC

## 2023-12-02 RX ORDER — MAGNESIUM SULFATE 500 MG/ML
1 VIAL (ML) INJECTION ONCE
Refills: 0 | Status: COMPLETED | OUTPATIENT
Start: 2023-12-02 | End: 2023-12-02

## 2023-12-02 RX ORDER — SODIUM,POTASSIUM PHOSPHATES 278-250MG
1 POWDER IN PACKET (EA) ORAL ONCE
Refills: 0 | Status: COMPLETED | OUTPATIENT
Start: 2023-12-02 | End: 2023-12-02

## 2023-12-02 RX ADMIN — ATORVASTATIN CALCIUM 20 MILLIGRAM(S): 80 TABLET, FILM COATED ORAL at 21:43

## 2023-12-02 RX ADMIN — MONTELUKAST 10 MILLIGRAM(S): 4 TABLET, CHEWABLE ORAL at 11:17

## 2023-12-02 RX ADMIN — INSULIN GLARGINE 10 UNIT(S): 100 INJECTION, SOLUTION SUBCUTANEOUS at 22:14

## 2023-12-02 RX ADMIN — HEPARIN SODIUM 5000 UNIT(S): 5000 INJECTION INTRAVENOUS; SUBCUTANEOUS at 05:56

## 2023-12-02 RX ADMIN — Medication 100 GRAM(S): at 10:12

## 2023-12-02 RX ADMIN — LOSARTAN POTASSIUM 100 MILLIGRAM(S): 100 TABLET, FILM COATED ORAL at 05:57

## 2023-12-02 RX ADMIN — Medication 1: at 12:34

## 2023-12-02 RX ADMIN — ALBUTEROL 2 PUFF(S): 90 AEROSOL, METERED ORAL at 09:06

## 2023-12-02 RX ADMIN — GABAPENTIN 100 MILLIGRAM(S): 400 CAPSULE ORAL at 05:56

## 2023-12-02 RX ADMIN — Medication 1 PACKET(S): at 10:11

## 2023-12-02 RX ADMIN — HEPARIN SODIUM 5000 UNIT(S): 5000 INJECTION INTRAVENOUS; SUBCUTANEOUS at 13:16

## 2023-12-02 RX ADMIN — PANTOPRAZOLE SODIUM 40 MILLIGRAM(S): 20 TABLET, DELAYED RELEASE ORAL at 05:57

## 2023-12-02 RX ADMIN — GABAPENTIN 100 MILLIGRAM(S): 400 CAPSULE ORAL at 13:16

## 2023-12-02 RX ADMIN — AMLODIPINE BESYLATE 5 MILLIGRAM(S): 2.5 TABLET ORAL at 05:56

## 2023-12-02 RX ADMIN — GABAPENTIN 100 MILLIGRAM(S): 400 CAPSULE ORAL at 21:43

## 2023-12-02 RX ADMIN — HEPARIN SODIUM 5000 UNIT(S): 5000 INJECTION INTRAVENOUS; SUBCUTANEOUS at 21:43

## 2023-12-02 NOTE — PROGRESS NOTE ADULT - ASSESSMENT
78F w/ pmh of hypertension, hyperlipidemia, diabetes on insulin, s/p  and AVINASH complaining of 2 days abdominal pain n/v. Pain is RLQ, associated with multiple episodes of large volume emesis. She is not passing flatus, last BM 2 days ago. She denies fevers/chills. Given resumed bowel function NGT was removed yesterday, however today she reports abdominal pain and one episode of emesis. AXR noted increased gas pattern and physical exam concerning for possible bowel obstruction. Patient with resumed bowel function and no further nausea, diet has been slowly advance with appropriate tolerance. Patient waiting for appropriate dispo planning given need of insulin teaching, rehab requirements and afterwards care.    Plan:  - Regular diet  - f/u endocrine recs  - Follow abdominal function  - PO meds  - Appreciate GYN recs  - Appreciate Endo recs  - DVTppx  - Dispo: Rehab     B team surgery [id number]

## 2023-12-02 NOTE — PROGRESS NOTE ADULT - ATTENDING COMMENTS
Pt seen and examined with housestaff.  Agree with assessment and plan. Imp: Resolving small bowel obstruction.  Advance diet

## 2023-12-02 NOTE — PROGRESS NOTE ADULT - SUBJECTIVE AND OBJECTIVE BOX
B TEAM SURGERY DAILY PROGRESS NOTE:     INTERVAL EVENTS: None    SUBJECTIVE/ROS: Patient seen and examined at bedside by surgical team. Reports overnight problems with chronic asthma, denies fever/chills, sob/chest pain. Reports appropriate bowel function.     OBJECTIVE:  Vital Signs Last 24 Hrs  T(C): 36.8 (02 Dec 2023 05:23), Max: 37.1 (01 Dec 2023 18:00)  T(F): 98.3 (02 Dec 2023 05:23), Max: 98.8 (01 Dec 2023 18:00)  HR: 72 (02 Dec 2023 05:23) (72 - 80)  BP: 152/51 (02 Dec 2023 05:23) (125/59 - 197/74)  BP(mean): --  RR: 18 (02 Dec 2023 05:23) (18 - 18)  SpO2: 100% (02 Dec 2023 05:23) (96% - 100%)    Parameters below as of 02 Dec 2023 05:23  Patient On (Oxygen Delivery Method): room air                            8.8    4.99  )-----------( 257      ( 02 Dec 2023 05:58 )             27.8     12-02    136  |  105  |  16  ----------------------------<  121<H>  4.5   |  21<L>  |  0.90    Ca    8.6      02 Dec 2023 05:58  Phos  2.8     12-02  Mg     1.70     12-02       I&O's Detail    01 Dec 2023 07:01  -  02 Dec 2023 07:00  --------------------------------------------------------  IN:    Oral Fluid: 1080 mL  Total IN: 1080 mL    OUT:    Stool (mL): 1 mL  Total OUT: 1 mL    Total NET: 1079 mL          IMAGING:      PHYSICAL EXAM:  Constitutional: NAD  Respiratory: non-labored breathing, patent airway  Gastrointestinal: abdomen soft, nontender, baseline distention unchanged.  Extremities: warm  Neurological: intact

## 2023-12-03 LAB
GLUCOSE BLDC GLUCOMTR-MCNC: 101 MG/DL — HIGH (ref 70–99)
GLUCOSE BLDC GLUCOMTR-MCNC: 101 MG/DL — HIGH (ref 70–99)
GLUCOSE BLDC GLUCOMTR-MCNC: 136 MG/DL — HIGH (ref 70–99)
GLUCOSE BLDC GLUCOMTR-MCNC: 136 MG/DL — HIGH (ref 70–99)
GLUCOSE BLDC GLUCOMTR-MCNC: 140 MG/DL — HIGH (ref 70–99)
GLUCOSE BLDC GLUCOMTR-MCNC: 140 MG/DL — HIGH (ref 70–99)
GLUCOSE BLDC GLUCOMTR-MCNC: 180 MG/DL — HIGH (ref 70–99)
GLUCOSE BLDC GLUCOMTR-MCNC: 180 MG/DL — HIGH (ref 70–99)

## 2023-12-03 PROCEDURE — 99232 SBSQ HOSP IP/OBS MODERATE 35: CPT | Mod: GC

## 2023-12-03 RX ADMIN — Medication 10 MILLIGRAM(S): at 14:46

## 2023-12-03 RX ADMIN — ALBUTEROL 2 PUFF(S): 90 AEROSOL, METERED ORAL at 10:42

## 2023-12-03 RX ADMIN — LOSARTAN POTASSIUM 100 MILLIGRAM(S): 100 TABLET, FILM COATED ORAL at 05:11

## 2023-12-03 RX ADMIN — AMLODIPINE BESYLATE 5 MILLIGRAM(S): 2.5 TABLET ORAL at 05:11

## 2023-12-03 RX ADMIN — PANTOPRAZOLE SODIUM 40 MILLIGRAM(S): 20 TABLET, DELAYED RELEASE ORAL at 05:11

## 2023-12-03 RX ADMIN — MONTELUKAST 10 MILLIGRAM(S): 4 TABLET, CHEWABLE ORAL at 12:30

## 2023-12-03 RX ADMIN — HEPARIN SODIUM 5000 UNIT(S): 5000 INJECTION INTRAVENOUS; SUBCUTANEOUS at 14:47

## 2023-12-03 RX ADMIN — Medication 975 MILLIGRAM(S): at 11:15

## 2023-12-03 RX ADMIN — INSULIN GLARGINE 10 UNIT(S): 100 INJECTION, SOLUTION SUBCUTANEOUS at 22:48

## 2023-12-03 RX ADMIN — HEPARIN SODIUM 5000 UNIT(S): 5000 INJECTION INTRAVENOUS; SUBCUTANEOUS at 05:11

## 2023-12-03 RX ADMIN — GABAPENTIN 100 MILLIGRAM(S): 400 CAPSULE ORAL at 22:48

## 2023-12-03 RX ADMIN — ATORVASTATIN CALCIUM 20 MILLIGRAM(S): 80 TABLET, FILM COATED ORAL at 22:48

## 2023-12-03 RX ADMIN — GABAPENTIN 100 MILLIGRAM(S): 400 CAPSULE ORAL at 14:47

## 2023-12-03 RX ADMIN — Medication 975 MILLIGRAM(S): at 10:42

## 2023-12-03 RX ADMIN — GABAPENTIN 100 MILLIGRAM(S): 400 CAPSULE ORAL at 05:11

## 2023-12-03 RX ADMIN — HEPARIN SODIUM 5000 UNIT(S): 5000 INJECTION INTRAVENOUS; SUBCUTANEOUS at 22:48

## 2023-12-03 NOTE — PROGRESS NOTE ADULT - SUBJECTIVE AND OBJECTIVE BOX
General Surgery Progress Note    Subjective: No events o/n. Patient resting in bed. Patient denies subjective fever/chills, CP, SOB, n/v, or abdominal pain. +Flatus/-BM. Pain well controlled, tolerating diet.       Objective:  Vitals:  T(C): 37.1 (12-03-23 @ 09:46), Max: 37.4 (12-02-23 @ 21:30)  HR: 73 (12-03-23 @ 09:46) (72 - 79)  BP: 149/55 (12-03-23 @ 09:46) (141/61 - 159/57)  RR: 18 (12-03-23 @ 09:46) (16 - 18)  SpO2: 98% (12-03-23 @ 09:46) (96% - 100%)  Wt(kg): --    12-02 @ 07:01  -  12-03 @ 07:00  --------------------------------------------------------  IN:    Oral Fluid: 840 mL  Total IN: 840 mL    OUT:    Voided (mL): 0 mL  Total OUT: 0 mL    Total NET: 840 mL          Physical Exam:  General Appearance: no acute distress, NTND   Chest: airway intact, non-labored breathing  CV: no JVD, palpable pulses b/l  Abdomen: soft, non-tender, non-distended, +BS   Extremities: [facility]       Labs:                        8.8    4.99  )-----------( 257      ( 02 Dec 2023 05:58 )             27.8     12-02    136  |  105  |  16  ----------------------------<  121<H>  4.5   |  21<L>  |  0.90    Ca    8.6      02 Dec 2023 05:58  Phos  2.8     12-02  Mg     1.70     12-02          Urinalysis Basic - ( 02 Dec 2023 05:58 )    Color: x / Appearance: x / SG: x / pH: x  Gluc: 121 mg/dL / Ketone: x  / Bili: x / Urobili: x   Blood: x / Protein: x / Nitrite: x   Leuk Esterase: x / RBC: x / WBC x   Sq Epi: x / Non Sq Epi: x / Bacteria: x     General Surgery Progress Note    Subjective: No events o/n. Patient resting in bed. Patient denies subjective fever/chills, CP, SOB, n/v, or abdominal pain. +Flatus/-BM. Pain well controlled, tolerating diet.       Objective:  Vitals:  T(C): 37.1 (12-03-23 @ 09:46), Max: 37.4 (12-02-23 @ 21:30)  HR: 73 (12-03-23 @ 09:46) (72 - 79)  BP: 149/55 (12-03-23 @ 09:46) (141/61 - 159/57)  RR: 18 (12-03-23 @ 09:46) (16 - 18)  SpO2: 98% (12-03-23 @ 09:46) (96% - 100%)  Wt(kg): --    12-02 @ 07:01  -  12-03 @ 07:00  --------------------------------------------------------  IN:    Oral Fluid: 840 mL  Total IN: 840 mL    OUT:    Voided (mL): 0 mL  Total OUT: 0 mL    Total NET: 840 mL          Physical Exam:  General Appearance: no acute distress, NTND   Chest: airway intact, non-labored breathing  CV: no JVD, palpable pulses b/l  Abdomen: soft, non-tender, non-distended, +BS   Extremities: [facility]       Labs:                        8.8    4.99  )-----------( 257      ( 02 Dec 2023 05:58 )             27.8     12-02    136  |  105  |  16  ----------------------------<  121<H>  4.5   |  21<L>  |  0.90    Ca    8.6      02 Dec 2023 05:58  Phos  2.8     12-02  Mg     1.70     12-02          Urinalysis Basic - ( 02 Dec 2023 05:58 )    Color: x / Appearance: x / SG: x / pH: x  Gluc: 121 mg/dL / Ketone: x  / Bili: x / Urobili: x   Blood: x / Protein: x / Nitrite: x   Leuk Esterase: x / RBC: x / WBC x   Sq Epi: x / Non Sq Epi: x / Bacteria: x     General Surgery Progress Note    Subjective: No events o/n. Patient resting in bed. Patient denies subjective fever/chills, CP, SOB, n/v, or abdominal pain. +Flatus/-BM. Pain well controlled, tolerating diet.       Objective:  Vitals:  T(C): 37.1 (12-03-23 @ 09:46), Max: 37.4 (12-02-23 @ 21:30)  HR: 73 (12-03-23 @ 09:46) (72 - 79)  BP: 149/55 (12-03-23 @ 09:46) (141/61 - 159/57)  RR: 18 (12-03-23 @ 09:46) (16 - 18)  SpO2: 98% (12-03-23 @ 09:46) (96% - 100%)  Wt(kg): --    12-02 @ 07:01  -  12-03 @ 07:00  --------------------------------------------------------  IN:    Oral Fluid: 840 mL  Total IN: 840 mL    OUT:    Voided (mL): 0 mL  Total OUT: 0 mL    Total NET: 840 mL          Physical Exam:  General Appearance: no acute distress, NTND   Chest: airway intact, non-labored breathing  CV: no JVD, palpable pulses b/l  Abdomen: soft, non-tender, non-distended, +BS   [place]ities: [facility] [place]       Labs:                        8.8    4.99  )-----------( 257      ( 02 Dec 2023 05:58 )             27.8     12-02    136  |  105  |  16  ----------------------------<  121<H>  4.5   |  21<L>  |  0.90    Ca    8.6      02 Dec 2023 05:58  Phos  2.8     12-02  Mg     1.70     12-02          Urinalysis Basic - ( 02 Dec 2023 05:58 )    Color: x / Appearance: x / SG: x / pH: x  Gluc: 121 mg/dL / Ketone: x  / Bili: x / Urobili: x   Blood: x / Protein: x / Nitrite: x   Leuk Esterase: x / RBC: x / WBC x   Sq Epi: x / Non Sq Epi: x / Bacteria: x

## 2023-12-03 NOTE — PROGRESS NOTE ADULT - ATTENDING COMMENTS
Pt seen and examined.  Agree with resident eval and plan.  Imp: Resolved small bowel obstruction.  Tolerating diet.  Physical therapy with OOB ambulation with assistance.

## 2023-12-03 NOTE — PROGRESS NOTE ADULT - ASSESSMENT
78F w/ pmh of hypertension, hyperlipidemia, diabetes on insulin, s/p  and AVINASH complaining of 2 days abdominal pain n/v. Pain is RLQ, associated with multiple episodes of large volume emesis. She is not passing flatus, last BM 2 days ago. She denies fevers/chills. Given resumed bowel function NGT was removed yesterday, however today she reports abdominal pain and one episode of emesis. AXR noted increased gas pattern and physical exam concerning for possible bowel obstruction. Patient with resumed bowel function and no further nausea, diet has been slowly advance with appropriate tolerance. Patient waiting for appropriate dispo planning given need of insulin teaching, rehab requirements and afterwards care.    Plan:  - Regular diet  - f/u endocrine recs  - Follow abdominal function  - PO meds  - Appreciate GYN recs  - Appreciate Endo recs  - DVTppx  - Dispo: Rehab     B team surgery [id number] 78F w/ pmh of hypertension, hyperlipidemia, diabetes on insulin, s/p  and AVINASH complaining of 2 days abdominal pain n/v. Pain is RLQ, associated with multiple episodes of large volume emesis. She is not passing flatus, last BM 2 days ago. She denies fevers/chills. Given resumed bowel function NGT was removed yesterday, however today she reports abdominal pain and one episode of emesis. AXR noted increased gas pattern and physical exam concerning for possible bowel obstruction. Patient with resumed bowel function and no further nausea, diet has been slowly advance with appropriate tolerance. Patient waiting for appropriate dispo planning given need of insulin teaching, rehab requirements and afterwards care.    Plan:  - Regular diet  - f/u endocrine recs  - Follow abdominal function  - PO meds  - Appreciate GYN recs  - Appreciate Endo recs  - DVTppx  - Dispo: Rehab     B team surgery [id number] 78F w/ pmh of hypertension, hyperlipidemia, diabetes on insulin, s/p  and AVINASH complaining of 2 days abdominal pain n/v. Pain is RLQ, associated with multiple episodes of large volume emesis. She is not passing flatus, last BM 2 days ago. She denies fevers/chills. Given resumed bowel function NGT was removed yesterday, however today she reports abdominal pain and one episode of emesis. AXR noted increased gas pattern and physical exam concerning for possible bowel obstruction. Patient with resumed bowel function and no further nausea, diet has been slowly advance with appropriate tolerance. Patient waiting for appropriate dispo planning given need of insulin teaching, rehab requirements and afterwards care.    Plan:  - Regular diet  - f/u endocrine recs  - Follow abdominal function  - PO meds  - Appreciate GYN recs  - Appreciate Endo recs  - DVTppx  - Dispo: Rehab     B team surgery [id number]

## 2023-12-04 LAB
ANION GAP SERPL CALC-SCNC: 11 MMOL/L — SIGNIFICANT CHANGE UP (ref 7–14)
ANION GAP SERPL CALC-SCNC: 11 MMOL/L — SIGNIFICANT CHANGE UP (ref 7–14)
BUN SERPL-MCNC: 23 MG/DL — SIGNIFICANT CHANGE UP (ref 7–23)
BUN SERPL-MCNC: 23 MG/DL — SIGNIFICANT CHANGE UP (ref 7–23)
CALCIUM SERPL-MCNC: 8.8 MG/DL — SIGNIFICANT CHANGE UP (ref 8.4–10.5)
CALCIUM SERPL-MCNC: 8.8 MG/DL — SIGNIFICANT CHANGE UP (ref 8.4–10.5)
CHLORIDE SERPL-SCNC: 104 MMOL/L — SIGNIFICANT CHANGE UP (ref 98–107)
CHLORIDE SERPL-SCNC: 104 MMOL/L — SIGNIFICANT CHANGE UP (ref 98–107)
CO2 SERPL-SCNC: 22 MMOL/L — SIGNIFICANT CHANGE UP (ref 22–31)
CO2 SERPL-SCNC: 22 MMOL/L — SIGNIFICANT CHANGE UP (ref 22–31)
CREAT SERPL-MCNC: 0.98 MG/DL — SIGNIFICANT CHANGE UP (ref 0.5–1.3)
CREAT SERPL-MCNC: 0.98 MG/DL — SIGNIFICANT CHANGE UP (ref 0.5–1.3)
EGFR: 59 ML/MIN/1.73M2 — LOW
EGFR: 59 ML/MIN/1.73M2 — LOW
GLUCOSE BLDC GLUCOMTR-MCNC: 112 MG/DL — HIGH (ref 70–99)
GLUCOSE BLDC GLUCOMTR-MCNC: 112 MG/DL — HIGH (ref 70–99)
GLUCOSE BLDC GLUCOMTR-MCNC: 117 MG/DL — HIGH (ref 70–99)
GLUCOSE BLDC GLUCOMTR-MCNC: 117 MG/DL — HIGH (ref 70–99)
GLUCOSE BLDC GLUCOMTR-MCNC: 156 MG/DL — HIGH (ref 70–99)
GLUCOSE BLDC GLUCOMTR-MCNC: 156 MG/DL — HIGH (ref 70–99)
GLUCOSE BLDC GLUCOMTR-MCNC: 170 MG/DL — HIGH (ref 70–99)
GLUCOSE BLDC GLUCOMTR-MCNC: 170 MG/DL — HIGH (ref 70–99)
GLUCOSE SERPL-MCNC: 134 MG/DL — HIGH (ref 70–99)
GLUCOSE SERPL-MCNC: 134 MG/DL — HIGH (ref 70–99)
HCT VFR BLD CALC: 28.5 % — LOW (ref 34.5–45)
HCT VFR BLD CALC: 28.5 % — LOW (ref 34.5–45)
HGB BLD-MCNC: 9 G/DL — LOW (ref 11.5–15.5)
HGB BLD-MCNC: 9 G/DL — LOW (ref 11.5–15.5)
MAGNESIUM SERPL-MCNC: 1.8 MG/DL — SIGNIFICANT CHANGE UP (ref 1.6–2.6)
MAGNESIUM SERPL-MCNC: 1.8 MG/DL — SIGNIFICANT CHANGE UP (ref 1.6–2.6)
MCHC RBC-ENTMCNC: 29.6 PG — SIGNIFICANT CHANGE UP (ref 27–34)
MCHC RBC-ENTMCNC: 29.6 PG — SIGNIFICANT CHANGE UP (ref 27–34)
MCHC RBC-ENTMCNC: 31.6 GM/DL — LOW (ref 32–36)
MCHC RBC-ENTMCNC: 31.6 GM/DL — LOW (ref 32–36)
MCV RBC AUTO: 93.8 FL — SIGNIFICANT CHANGE UP (ref 80–100)
MCV RBC AUTO: 93.8 FL — SIGNIFICANT CHANGE UP (ref 80–100)
NRBC # BLD: 0 /100 WBCS — SIGNIFICANT CHANGE UP (ref 0–0)
NRBC # BLD: 0 /100 WBCS — SIGNIFICANT CHANGE UP (ref 0–0)
NRBC # FLD: 0 K/UL — SIGNIFICANT CHANGE UP (ref 0–0)
NRBC # FLD: 0 K/UL — SIGNIFICANT CHANGE UP (ref 0–0)
PHOSPHATE SERPL-MCNC: 2.8 MG/DL — SIGNIFICANT CHANGE UP (ref 2.5–4.5)
PHOSPHATE SERPL-MCNC: 2.8 MG/DL — SIGNIFICANT CHANGE UP (ref 2.5–4.5)
PLATELET # BLD AUTO: 302 K/UL — SIGNIFICANT CHANGE UP (ref 150–400)
PLATELET # BLD AUTO: 302 K/UL — SIGNIFICANT CHANGE UP (ref 150–400)
POTASSIUM SERPL-MCNC: 4.3 MMOL/L — SIGNIFICANT CHANGE UP (ref 3.5–5.3)
POTASSIUM SERPL-MCNC: 4.3 MMOL/L — SIGNIFICANT CHANGE UP (ref 3.5–5.3)
POTASSIUM SERPL-SCNC: 4.3 MMOL/L — SIGNIFICANT CHANGE UP (ref 3.5–5.3)
POTASSIUM SERPL-SCNC: 4.3 MMOL/L — SIGNIFICANT CHANGE UP (ref 3.5–5.3)
RBC # BLD: 3.04 M/UL — LOW (ref 3.8–5.2)
RBC # BLD: 3.04 M/UL — LOW (ref 3.8–5.2)
RBC # FLD: 13 % — SIGNIFICANT CHANGE UP (ref 10.3–14.5)
RBC # FLD: 13 % — SIGNIFICANT CHANGE UP (ref 10.3–14.5)
SODIUM SERPL-SCNC: 137 MMOL/L — SIGNIFICANT CHANGE UP (ref 135–145)
SODIUM SERPL-SCNC: 137 MMOL/L — SIGNIFICANT CHANGE UP (ref 135–145)
WBC # BLD: 4.49 K/UL — SIGNIFICANT CHANGE UP (ref 3.8–10.5)
WBC # BLD: 4.49 K/UL — SIGNIFICANT CHANGE UP (ref 3.8–10.5)
WBC # FLD AUTO: 4.49 K/UL — SIGNIFICANT CHANGE UP (ref 3.8–10.5)
WBC # FLD AUTO: 4.49 K/UL — SIGNIFICANT CHANGE UP (ref 3.8–10.5)

## 2023-12-04 PROCEDURE — 99232 SBSQ HOSP IP/OBS MODERATE 35: CPT

## 2023-12-04 PROCEDURE — 99232 SBSQ HOSP IP/OBS MODERATE 35: CPT | Mod: GC

## 2023-12-04 RX ORDER — LOSARTAN POTASSIUM 100 MG/1
100 TABLET, FILM COATED ORAL DAILY
Refills: 0 | Status: DISCONTINUED | OUTPATIENT
Start: 2023-12-04 | End: 2023-12-05

## 2023-12-04 RX ORDER — MAGNESIUM SULFATE 500 MG/ML
2 VIAL (ML) INJECTION ONCE
Refills: 0 | Status: COMPLETED | OUTPATIENT
Start: 2023-12-04 | End: 2023-12-04

## 2023-12-04 RX ORDER — AMLODIPINE BESYLATE 2.5 MG/1
5 TABLET ORAL DAILY
Refills: 0 | Status: DISCONTINUED | OUTPATIENT
Start: 2023-12-04 | End: 2023-12-05

## 2023-12-04 RX ADMIN — INSULIN GLARGINE 10 UNIT(S): 100 INJECTION, SOLUTION SUBCUTANEOUS at 22:13

## 2023-12-04 RX ADMIN — GABAPENTIN 100 MILLIGRAM(S): 400 CAPSULE ORAL at 14:48

## 2023-12-04 RX ADMIN — Medication 25 GRAM(S): at 09:41

## 2023-12-04 RX ADMIN — HEPARIN SODIUM 5000 UNIT(S): 5000 INJECTION INTRAVENOUS; SUBCUTANEOUS at 22:12

## 2023-12-04 RX ADMIN — HEPARIN SODIUM 5000 UNIT(S): 5000 INJECTION INTRAVENOUS; SUBCUTANEOUS at 14:48

## 2023-12-04 RX ADMIN — GABAPENTIN 100 MILLIGRAM(S): 400 CAPSULE ORAL at 05:15

## 2023-12-04 RX ADMIN — LOSARTAN POTASSIUM 100 MILLIGRAM(S): 100 TABLET, FILM COATED ORAL at 05:15

## 2023-12-04 RX ADMIN — PANTOPRAZOLE SODIUM 40 MILLIGRAM(S): 20 TABLET, DELAYED RELEASE ORAL at 05:16

## 2023-12-04 RX ADMIN — ATORVASTATIN CALCIUM 20 MILLIGRAM(S): 80 TABLET, FILM COATED ORAL at 22:12

## 2023-12-04 RX ADMIN — GABAPENTIN 100 MILLIGRAM(S): 400 CAPSULE ORAL at 22:12

## 2023-12-04 RX ADMIN — Medication 1: at 18:01

## 2023-12-04 RX ADMIN — AMLODIPINE BESYLATE 5 MILLIGRAM(S): 2.5 TABLET ORAL at 05:15

## 2023-12-04 RX ADMIN — MONTELUKAST 10 MILLIGRAM(S): 4 TABLET, CHEWABLE ORAL at 12:30

## 2023-12-04 RX ADMIN — ALBUTEROL 2 PUFF(S): 90 AEROSOL, METERED ORAL at 09:33

## 2023-12-04 RX ADMIN — HEPARIN SODIUM 5000 UNIT(S): 5000 INJECTION INTRAVENOUS; SUBCUTANEOUS at 05:15

## 2023-12-04 NOTE — PROGRESS NOTE ADULT - ASSESSMENT
Assessment and Recommendation:   · Assessment	  78F w/ pmh of hypertension, hyperlipidemia, diabetes on insulin, s/p  and AVINASH complaining of 2 days abdominal pain n/v with concern for SBO, with minimal po intake   endocrine called for DM   a1c 8.3    Poorly controlled T2DM with hyperglycemia  - HbA1c: 8  - Home Regimen:  Patient reports Lantus 60 units at bedtime however, if only requiring 10 units at this time   Recent hypoglycemia on this admission    PLAN  - Consider internal medicine consult or rheumatology consult to rule out arthritis given hand/finger deformity.  This patient reports living alone with decreased dexterity to manage diabetes which is concerning for safety for rehabilitation. See clinical pharmacist assessment today: patient will need assistance with once daily basal insulin along with fingerstick or CGM monitoring in outpatient setting.     While inpatient and for rehab:  - Continue Lantus 10  units at bedtime. DO NOT HOLD IF NPO.  - Continue low dose Admelog correction scale pre-meal  - Continue low dose Admelog correction scale at bedtime  - Fingerstick BG before meals and bedtime  - Goal -180  - Carbohydrate consistent diet  - RD consult attempted but commented as terminated - see note       Discharge plan:    1) To rehab: Lantus 10 units at bedtime along with Admelog correctional scales are currently ordered:  Admelog correctional scale before meals:   1 Unit(s) if Glucose 151 - 200  2 Unit(s) if Glucose 201 - 250  3 Unit(s) if Glucose 251 - 300  4 Unit(s) if Glucose 301 - 350  5 Unit(s) if Glucose 351 - 400  6 Unit(s) if Glucose Greater Than 400  and  Admelog correctional scale before bedtime  0 Unit(s) if Glucose 0 - 250  1 Unit(s) if Glucose 251 - 300  2 Unit(s) if Glucose 301 - 350  3 Unit(s) if Glucose 351 - 400  4 Unit(s) if Glucose Greater Than 400    2) Concerns about patient's ability to self manage insulin and glucose monitoring at home, even with CGM due to poor hand dexterity. Would need assistance applying CGM sensor but most importantly, she appears to need assistance with once daily basal insulin as assessment by clinical pharmacist today, She is requiring significantly less than home reported basal insulin dose while well controlled glucose which is concerning for improper insulin administration at home.      If patient remains admitted overnight, would send both serum glucose and c-peptide level tomorrow morning to assess if patient can possibly be transitioned to oral DM2 medication regimen as outpatient     Would consider internal medicine or rheumatology consult to rule out arthritis due to appearance of her fingers.     3) Case management and social work should see this patient. She reports living alone, being uncomfortable to return to living alone and to have difficulty self managing diabetes. Would assess if there are any family members to assist with medication at home once patient leaves rehab. Defer to primary team to coordinate consult.     Discussed about with primary team - Dr Curt Garcia, ANP-BC  Nurse Practitioner   Division of Endocrinology  Pager: LITALIA pager [phone]    If out of hospital/unavailable when paged, please note: patient will be cared for by another provider on the endocrine service.  Please call the endocrine answering service for assistance to reach covering provider ([phone]). For non-urgent matters, please email [email or web address] for assistance.       HTN  PLAN  - currently on amlodipine and losartan  - Can check urine microalbumin outpatient  - Outpatient goal BP <130/80  - management as per primary team     HLD  PLAN  - Continue atorvastatin 20mg daily per primary team/cardiology if no contraindications   - needs lipid panel outpt     GLADYS Ordaz-BC  Nurse Practitioner   Division of Endocrinology  Pager: LI pager [phone]    If out of hospital/unavailable when paged, please note: patient will be cared for by another provider on the endocrine service.  Please call the endocrine answering service for assistance to reach covering provider ([phone]). For non-urgent matters, please email [email or web address] for assistance.              Assessment and Recommendation:   · Assessment	  78F w/ pmh of hypertension, hyperlipidemia, diabetes on insulin, s/p  and AVINASH complaining of 2 days abdominal pain n/v with concern for SBO, with minimal po intake   endocrine called for DM   a1c 8.3    Poorly controlled T2DM with hyperglycemia  - HbA1c: 8  - Home Regimen:  Patient reports Lantus 60 units at bedtime however, if only requiring 10 units at this time   Recent hypoglycemia on this admission    PLAN  - Consider internal medicine consult or rheumatology consult to rule out arthritis given hand/finger deformity.  This patient reports living alone with decreased dexterity to manage diabetes which is concerning for safety for rehabilitation. See clinical pharmacist assessment today: patient will need assistance with once daily basal insulin along with fingerstick or CGM monitoring in outpatient setting.     While inpatient and for rehab:  - Continue Lantus 10  units at bedtime. DO NOT HOLD IF NPO.  - Continue low dose Admelog correction scale pre-meal  - Continue low dose Admelog correction scale at bedtime  - Fingerstick BG before meals and bedtime  - Goal -180  - Carbohydrate consistent diet  - RD consult attempted but commented as terminated - see note       Discharge plan:    1) To rehab: Lantus 10 units at bedtime along with Admelog correctional scales are currently ordered:  Admelog correctional scale before meals:   1 Unit(s) if Glucose 151 - 200  2 Unit(s) if Glucose 201 - 250  3 Unit(s) if Glucose 251 - 300  4 Unit(s) if Glucose 301 - 350  5 Unit(s) if Glucose 351 - 400  6 Unit(s) if Glucose Greater Than 400  and  Admelog correctional scale before bedtime  0 Unit(s) if Glucose 0 - 250  1 Unit(s) if Glucose 251 - 300  2 Unit(s) if Glucose 301 - 350  3 Unit(s) if Glucose 351 - 400  4 Unit(s) if Glucose Greater Than 400    2) Concerns about patient's ability to self manage insulin and glucose monitoring at home, even with CGM due to poor hand dexterity. Would need assistance applying CGM sensor but most importantly, she appears to need assistance with once daily basal insulin as assessment by clinical pharmacist today, She is requiring significantly less than home reported basal insulin dose while well controlled glucose which is concerning for improper insulin administration at home.      If patient remains admitted overnight, would send both serum glucose and c-peptide level tomorrow morning to assess if patient can possibly be transitioned to oral DM2 medication regimen as outpatient     Would consider internal medicine or rheumatology consult to rule out arthritis due to appearance of her fingers.     3) Case management and social work should see this patient. She reports living alone, being uncomfortable to return to living alone and to have difficulty self managing diabetes. Would assess if there are any family members to assist with medication at home once patient leaves rehab. Defer to primary team to coordinate consult.     Discussed about with primary team - Dr Curt Garcia, ANP-BC  Nurse Practitioner   Division of Endocrinology  Pager: LITALIA pager [phone]    If out of hospital/unavailable when paged, please note: patient will be cared for by another provider on the endocrine service.  Please call the endocrine answering service for assistance to reach covering provider ([phone]). For non-urgent matters, please email [email or web address] for assistance.       HTN  PLAN  - currently on amlodipine and losartan  - Can check urine microalbumin outpatient  - Outpatient goal BP <130/80  - management as per primary team     HLD  PLAN  - Continue atorvastatin 20mg daily per primary team/cardiology if no contraindications   - needs lipid panel outpt     GLADYS Ordaz-BC  Nurse Practitioner   Division of Endocrinology  Pager: LI pager [phone]    If out of hospital/unavailable when paged, please note: patient will be cared for by another provider on the endocrine service.  Please call the endocrine answering service for assistance to reach covering provider ([phone]). For non-urgent matters, please email [email or web address] for assistance.              Assessment and Recommendation:   · Assessment	  78F w/ pmh of hypertension, hyperlipidemia, diabetes on insulin, s/p  and AVINASH complaining of 2 days abdominal pain n/v with concern for SBO, with minimal po intake   endocrine called for DM   a1c 8.3    Poorly controlled T2DM with hyperglycemia  - HbA1c: 8  - Home Regimen:  Patient reports Lantus 60 units at bedtime however, if only requiring 10 units at this time   Recent hypoglycemia on this admission    PLAN  - Consider internal medicine consult or rheumatology consult to rule out arthritis given hand/finger deformity.  This patient reports living alone with decreased dexterity to manage diabetes which is concerning for safety for rehabilitation. See clinical pharmacist assessment today: patient will need assistance with once daily basal insulin along with fingerstick or CGM monitoring in outpatient setting.     While inpatient and for rehab:  - Continue Lantus 10  units at bedtime. DO NOT HOLD IF NPO.  - Continue low dose Admelog correction scale pre-meal  - Continue low dose Admelog correction scale at bedtime  - Fingerstick BG before meals and bedtime  - Goal -180  - Carbohydrate consistent diet  - RD consult attempted but commented as terminated - see note       Discharge plan:    1) To rehab: Lantus 10 units at bedtime along with Admelog correctional scales are currently ordered:  Admelog correctional scale before meals:   1 Unit(s) if Glucose 151 - 200  2 Unit(s) if Glucose 201 - 250  3 Unit(s) if Glucose 251 - 300  4 Unit(s) if Glucose 301 - 350  5 Unit(s) if Glucose 351 - 400  6 Unit(s) if Glucose Greater Than 400  and  Admelog correctional scale before bedtime  0 Unit(s) if Glucose 0 - 250  1 Unit(s) if Glucose 251 - 300  2 Unit(s) if Glucose 301 - 350  3 Unit(s) if Glucose 351 - 400  4 Unit(s) if Glucose Greater Than 400    2) Concerns about patient's ability to self manage insulin and glucose monitoring at home, even with CGM due to poor hand dexterity. Would need assistance applying CGM sensor but most importantly, she appears to need assistance with once daily basal insulin as assessment by clinical pharmacist today, She is requiring significantly less than home reported basal insulin dose while well controlled glucose which is concerning for improper insulin administration at home.      If patient remains admitted overnight, would send both serum glucose and c-peptide level tomorrow morning to assess if patient can possibly be transitioned to oral DM2 medication regimen as outpatient     Would consider internal medicine or rheumatology consult to rule out arthritis due to appearance of her fingers.     3) Case management and social work should see this patient. She reports living alone, being uncomfortable to return to living alone and to have difficulty self managing diabetes. Would assess if there are any family members to assist with medication at home once patient leaves rehab. Defer to primary team to coordinate consult.     HTN  PLAN  - currently on amlodipine and losartan  - Can check urine microalbumin outpatient  - Outpatient goal BP <130/80  - management as per primary team     HLD  PLAN  - Continue atorvastatin 20mg daily per primary team/cardiology if no contraindications   - needs lipid panel outpt     Discussed about with primary team - Dr Curt Garcia, ANP-BC  Nurse Practitioner   Division of Endocrinology  Pager: SUKI pager [phone]    If out of hospital/unavailable when paged, please note: patient will be cared for by another provider on the endocrine service.  Please call the endocrine answering service for assistance to reach covering provider ([phone]). For non-urgent matters, please email [email or web address] for assistance.              Assessment and Recommendation:   · Assessment	  78F w/ pmh of hypertension, hyperlipidemia, diabetes on insulin, s/p  and AVINASH complaining of 2 days abdominal pain n/v with concern for SBO, with minimal po intake   endocrine called for DM   a1c 8.3    Poorly controlled T2DM with hyperglycemia  - HbA1c: 8  - Home Regimen:  Patient reports Lantus 60 units at bedtime however, if only requiring 10 units at this time   Recent hypoglycemia on this admission    PLAN  - Consider internal medicine consult or rheumatology consult to rule out arthritis given hand/finger deformity.  This patient reports living alone with decreased dexterity to manage diabetes which is concerning for safety for rehabilitation. See clinical pharmacist assessment today: patient will need assistance with once daily basal insulin along with fingerstick or CGM monitoring in outpatient setting.     While inpatient and for rehab:  - Continue Lantus 10  units at bedtime. DO NOT HOLD IF NPO.  - Continue low dose Admelog correction scale pre-meal  - Continue low dose Admelog correction scale at bedtime  - Fingerstick BG before meals and bedtime  - Goal -180  - Carbohydrate consistent diet  - RD consult attempted but commented as terminated - see note       Discharge plan:    1) To rehab: Lantus 10 units at bedtime along with Admelog correctional scales are currently ordered:  Admelog correctional scale before meals:   1 Unit(s) if Glucose 151 - 200  2 Unit(s) if Glucose 201 - 250  3 Unit(s) if Glucose 251 - 300  4 Unit(s) if Glucose 301 - 350  5 Unit(s) if Glucose 351 - 400  6 Unit(s) if Glucose Greater Than 400  and  Admelog correctional scale before bedtime  0 Unit(s) if Glucose 0 - 250  1 Unit(s) if Glucose 251 - 300  2 Unit(s) if Glucose 301 - 350  3 Unit(s) if Glucose 351 - 400  4 Unit(s) if Glucose Greater Than 400    2) Concerns about patient's ability to self manage insulin and glucose monitoring at home, even with CGM due to poor hand dexterity. Would need assistance applying CGM sensor but most importantly, she appears to need assistance with once daily basal insulin as assessment by clinical pharmacist today, She is requiring significantly less than home reported basal insulin dose while well controlled glucose which is concerning for improper insulin administration at home.      If patient remains admitted overnight, would send both serum glucose and c-peptide level tomorrow morning to assess if patient can possibly be transitioned to oral DM2 medication regimen as outpatient     Would consider internal medicine or rheumatology consult to rule out arthritis due to appearance of her fingers.     3) Case management and social work should see this patient. She reports living alone, being uncomfortable to return to living alone and to have difficulty self managing diabetes. Would assess if there are any family members to assist with medication at home once patient leaves rehab. Defer to primary team to coordinate consult.     HTN  PLAN  - currently on amlodipine and losartan  - Can check urine microalbumin outpatient  - Outpatient goal BP <130/80  - management as per primary team     HLD  PLAN  - Continue atorvastatin 20mg daily per primary team/cardiology if no contraindications   - needs lipid panel outpt     Discussed about with primary team - Dr Curt Garcia, ANP-BC  Nurse Practitioner   Division of Endocrinology  Pager: SUKI pager [phone]    If out of hospital/unavailable when paged, please note: patient will be cared for by another provider on the endocrine service.  Please call the endocrine answering service for assistance to reach covering provider ([phone]). For non-urgent matters, please email [email or web address] for assistance.              Assessment and Recommendation:   · Assessment	  78F w/ pmh of hypertension, hyperlipidemia, diabetes on insulin, s/p  and AVINASH complaining of 2 days abdominal pain n/v with concern for SBO, with minimal po intake   endocrine called for DM   a1c 8.3    Poorly controlled T2DM with hyperglycemia  - HbA1c: 8  - Home Regimen:  Patient reports Lantus 60 units at bedtime however, if only requiring 10 units at this time. Our pharmacist confirmed that patient was also on Ozempic 2 mg weekly and Farxiga 10 mg daily.     Recent hypoglycemia on this admission    PLAN  - Consider internal medicine consult or rheumatology consult to rule out arthritis given hand/finger deformity.  This patient reports living alone with decreased dexterity to manage diabetes which is concerning for safety for rehabilitation. See clinical pharmacist assessment today: patient will need assistance with once daily basal insulin along with fingerstick or CGM monitoring in outpatient setting.     While inpatient and for rehab:  - Continue Lantus 10  units at bedtime. DO NOT HOLD IF NPO.  - Continue low dose Admelog correction scale pre-meal  - Continue low dose Admelog correction scale at bedtime  - Fingerstick BG before meals and bedtime  - Goal -180  - Carbohydrate consistent diet  - RD consult attempted but commented as terminated - see note       Discharge plan:    1) To rehab: Lantus 10 units at bedtime along with Admelog correctional scales are currently ordered:  Admelog correctional scale before meals:   1 Unit(s) if Glucose 151 - 200  2 Unit(s) if Glucose 201 - 250  3 Unit(s) if Glucose 251 - 300  4 Unit(s) if Glucose 301 - 350  5 Unit(s) if Glucose 351 - 400  6 Unit(s) if Glucose Greater Than 400  and  Admelog correctional scale before bedtime  0 Unit(s) if Glucose 0 - 250  1 Unit(s) if Glucose 251 - 300  2 Unit(s) if Glucose 301 - 350  3 Unit(s) if Glucose 351 - 400  4 Unit(s) if Glucose Greater Than 400    Would defer resumption of Farxia and Ozempic to outpatient setting given low insulin requirements at this time. Should be re-evaluated by outpatient endocrine provider.     Patient reported to out team that she does have a private endocrinologist. Would confirm provider's name and ensure follow up is scheduled. Defter to primary team.    If needing outpatient endocrine follow up, can call: [address] [place] [address], [address], [phone]     2) Concerns about patient's ability to self manage insulin and glucose monitoring at home, even with CGM due to poor hand dexterity. Would need assistance applying CGM sensor but most importantly, she appears to need assistance with once daily basal insulin as assessment by clinical pharmacist today, She is requiring significantly less than home reported basal insulin dose while well controlled glucose which is concerning for improper insulin administration at home.      If patient remains admitted overnight, would send both serum glucose and c-peptide level tomorrow morning to assess if patient can possibly be transitioned to oral DM2 medication regimen as outpatient.    Would consider internal medicine or rheumatology consult to rule out arthritis due to appearance of her fingers.     3) Case management and social work should see this patient. She reports living alone, being uncomfortable to return to living alone and to have difficulty self managing diabetes. Would assess if there are any family members to assist with medication at home once patient leaves rehab. Defer to primary team to coordinate consult.     Please refer to our clinical pharmacist's documentation today.     HTN  PLAN  - currently on amlodipine and losartan  - Can check urine microalbumin outpatient  - Outpatient goal BP <130/80  - management as per primary team     HLD  PLAN  - Continue atorvastatin 20mg daily per primary team/cardiology if no contraindications   - needs lipid panel outpt     Discussed about with primary team - Dr Curt Garcia, ANP-BC  Nurse Practitioner   Division of Endocrinology  Pager: SUKI pager [phone]    If out of hospital/unavailable when paged, please note: patient will be cared for by another provider on the endocrine service.  Please call the endocrine answering service for assistance to reach covering provider ([phone]). For non-urgent matters, please email [email or web address] for assistance.              Assessment and Recommendation:   · Assessment	  78F w/ pmh of hypertension, hyperlipidemia, diabetes on insulin, s/p  and AVINASH complaining of 2 days abdominal pain n/v with concern for SBO, with minimal po intake   endocrine called for DM   a1c 8.3    Poorly controlled T2DM with hyperglycemia  - HbA1c: 8  - Home Regimen:  Patient reports Lantus 60 units at bedtime however, if only requiring 10 units at this time. Our pharmacist confirmed that patient was also on Ozempic 2 mg weekly and Farxiga 10 mg daily.     Recent hypoglycemia on this admission    PLAN  - Consider internal medicine consult or rheumatology consult to rule out arthritis given hand/finger deformity.  This patient reports living alone with decreased dexterity to manage diabetes which is concerning for safety for rehabilitation. See clinical pharmacist assessment today: patient will need assistance with once daily basal insulin along with fingerstick or CGM monitoring in outpatient setting.     While inpatient and for rehab:  - Continue Lantus 10  units at bedtime. DO NOT HOLD IF NPO.  - Continue low dose Admelog correction scale pre-meal  - Continue low dose Admelog correction scale at bedtime  - Fingerstick BG before meals and bedtime  - Goal -180  - Carbohydrate consistent diet  - RD consult attempted but commented as terminated - see note       Discharge plan:    1) To rehab: Lantus 10 units at bedtime along with Admelog correctional scales are currently ordered:  Admelog correctional scale before meals:   1 Unit(s) if Glucose 151 - 200  2 Unit(s) if Glucose 201 - 250  3 Unit(s) if Glucose 251 - 300  4 Unit(s) if Glucose 301 - 350  5 Unit(s) if Glucose 351 - 400  6 Unit(s) if Glucose Greater Than 400  and  Admelog correctional scale before bedtime  0 Unit(s) if Glucose 0 - 250  1 Unit(s) if Glucose 251 - 300  2 Unit(s) if Glucose 301 - 350  3 Unit(s) if Glucose 351 - 400  4 Unit(s) if Glucose Greater Than 400    Would defer resumption of Farxia and Ozempic to outpatient setting given low insulin requirements at this time. Should be re-evaluated by outpatient endocrine provider.     Patient reported to out team that she does have a private endocrinologist. Would confirm provider's name and ensure follow up is scheduled. Defter to primary team.    If needing outpatient endocrine follow up, can call: [address] [place] [address], [address], [phone]     2) Concerns about patient's ability to self manage insulin and glucose monitoring at home, even with CGM due to poor hand dexterity. Would need assistance applying CGM sensor but most importantly, she appears to need assistance with once daily basal insulin as assessment by clinical pharmacist today, She is requiring significantly less than home reported basal insulin dose while well controlled glucose which is concerning for improper insulin administration at home.      If patient remains admitted overnight, would send both serum glucose and c-peptide level tomorrow morning to assess if patient can possibly be transitioned to oral DM2 medication regimen as outpatient.    Would consider internal medicine or rheumatology consult to rule out arthritis due to appearance of her fingers.     3) Case management and social work should see this patient. She reports living alone, being uncomfortable to return to living alone and to have difficulty self managing diabetes. Would assess if there are any family members to assist with medication at home once patient leaves rehab. Defer to primary team to coordinate consult.     Please refer to our clinical pharmacist's documentation today.     HTN  PLAN  - currently on amlodipine and losartan  - Can check urine microalbumin outpatient  - Outpatient goal BP <130/80  - management as per primary team     HLD  PLAN  - Continue atorvastatin 20mg daily per primary team/cardiology if no contraindications   - needs lipid panel outpt     Discussed about with primary team - Dr Curt Garcia, ANP-BC  Nurse Practitioner   Division of Endocrinology  Pager: SUKI pager [phone]    If out of hospital/unavailable when paged, please note: patient will be cared for by another provider on the endocrine service.  Please call the endocrine answering service for assistance to reach covering provider ([phone]). For non-urgent matters, please email [email or web address] for assistance.              Assessment and Recommendation:   · Assessment	  78F w/ pmh of hypertension, hyperlipidemia, diabetes on insulin, s/p  and AVINASH complaining of 2 days abdominal pain n/v with concern for SBO, with minimal po intake   endocrine called for DM   a1c 8.3    Poorly controlled T2DM with hyperglycemia  - HbA1c: 8  - Home Regimen:  Patient reports Lantus 60 units at bedtime however, if only requiring 10 units at this time. Our pharmacist confirmed that patient was also on Ozempic 2 mg weekly and Farxiga 10 mg daily.     Recent hypoglycemia on this admission    PLAN  - Consider internal medicine consult or rheumatology consult to rule out arthritis given hand/finger deformity.  This patient reports living alone with decreased dexterity to manage diabetes which is concerning for safety for rehabilitation. See clinical pharmacist assessment today: patient will need assistance with once daily basal insulin along with fingerstick or CGM monitoring in outpatient setting.     While inpatient and for rehab:  - Continue Lantus 10  units at bedtime. DO NOT HOLD IF NPO.  - Continue low dose Admelog correction scale pre-meal  - Continue low dose Admelog correction scale at bedtime  - Fingerstick BG before meals and bedtime  - Goal -180  - Carbohydrate consistent diet  - RD consult attempted but commented as terminated - see note       Discharge plan:    1) To rehab: Lantus 10 units at bedtime along with Admelog correctional scales are currently ordered:  Admelog correctional scale before meals:   1 Unit(s) if Glucose 151 - 200  2 Unit(s) if Glucose 201 - 250  3 Unit(s) if Glucose 251 - 300  4 Unit(s) if Glucose 301 - 350  5 Unit(s) if Glucose 351 - 400  6 Unit(s) if Glucose Greater Than 400  and  Admelog correctional scale before bedtime  0 Unit(s) if Glucose 0 - 250  1 Unit(s) if Glucose 251 - 300  2 Unit(s) if Glucose 301 - 350  3 Unit(s) if Glucose 351 - 400  4 Unit(s) if Glucose Greater Than 400    Would defer resumption of Farxia and Ozempic to outpatient setting given low insulin requirements at this time. Should be re-evaluated by outpatient endocrine provider.     Patient reported to our team that she does have a private endocrinologist. Would confirm provider's name and ensure follow up is scheduled. Defter to primary team.    If needing outpatient endocrine follow up, can call: [address] [place] [address], [address], [phone]     2) Concerns about patient's ability to self manage insulin and glucose monitoring at home, even with CGM due to poor hand dexterity. Would need assistance applying CGM sensor but most importantly, she appears to need assistance with once daily basal insulin as assessment by clinical pharmacist today, She is requiring significantly less than home reported basal insulin dose while well controlled glucose which is concerning for improper insulin administration at home.      If patient remains admitted overnight, would send both serum glucose and c-peptide level tomorrow morning to assess if patient can possibly be transitioned to oral DM2 medication regimen as outpatient.    Would consider internal medicine or rheumatology consult to rule out arthritis due to appearance of her fingers.     3) Case management and social work should see this patient. She reports living alone, being uncomfortable to return to living alone and to have difficulty self managing diabetes. Would assess if there are any family members to assist with medication at home once patient leaves rehab. Defer to primary team to coordinate consult.     Please refer to our clinical pharmacist's documentation today.     HTN  PLAN  - currently on amlodipine and losartan  - Can check urine microalbumin outpatient  - Outpatient goal BP <130/80  - management as per primary team     HLD  PLAN  - Continue atorvastatin 20mg daily per primary team/cardiology if no contraindications   - needs lipid panel outpt     Discussed about with primary team - Dr Curt Garcia, ANP-BC  Nurse Practitioner   Division of Endocrinology  Pager: SUKI pager [phone]    If out of hospital/unavailable when paged, please note: patient will be cared for by another provider on the endocrine service.  Please call the endocrine answering service for assistance to reach covering provider ([phone]). For non-urgent matters, please email [email or web address] for assistance.              Assessment and Recommendation:   · Assessment	  78F w/ pmh of hypertension, hyperlipidemia, diabetes on insulin, s/p  and AVINASH complaining of 2 days abdominal pain n/v with concern for SBO, with minimal po intake   endocrine called for DM   a1c 8.3    Poorly controlled T2DM with hyperglycemia  - HbA1c: 8  - Home Regimen:  Patient reports Lantus 60 units at bedtime however, if only requiring 10 units at this time. Our pharmacist confirmed that patient was also on Ozempic 2 mg weekly and Farxiga 10 mg daily.     Recent hypoglycemia on this admission    PLAN  - Consider internal medicine consult or rheumatology consult to rule out arthritis given hand/finger deformity.  This patient reports living alone with decreased dexterity to manage diabetes which is concerning for safety for rehabilitation. See clinical pharmacist assessment today: patient will need assistance with once daily basal insulin along with fingerstick or CGM monitoring in outpatient setting.     While inpatient and for rehab:  - Continue Lantus 10  units at bedtime. DO NOT HOLD IF NPO.  - Continue low dose Admelog correction scale pre-meal  - Continue low dose Admelog correction scale at bedtime  - Fingerstick BG before meals and bedtime  - Goal -180  - Carbohydrate consistent diet  - RD consult attempted but commented as terminated - see note       Discharge plan:    1) To rehab: Lantus 10 units at bedtime along with Admelog correctional scales are currently ordered:  Admelog correctional scale before meals:   1 Unit(s) if Glucose 151 - 200  2 Unit(s) if Glucose 201 - 250  3 Unit(s) if Glucose 251 - 300  4 Unit(s) if Glucose 301 - 350  5 Unit(s) if Glucose 351 - 400  6 Unit(s) if Glucose Greater Than 400  and  Admelog correctional scale before bedtime  0 Unit(s) if Glucose 0 - 250  1 Unit(s) if Glucose 251 - 300  2 Unit(s) if Glucose 301 - 350  3 Unit(s) if Glucose 351 - 400  4 Unit(s) if Glucose Greater Than 400    Would defer resumption of Farxia and Ozempic to outpatient setting given low insulin requirements at this time. Should be re-evaluated by outpatient endocrine provider.     Patient reported to our team that she does have a private endocrinologist. Would confirm provider's name and ensure follow up is scheduled. Defter to primary team.    If needing outpatient endocrine follow up, can call: [address] [place] [address], [address], [phone]     2) Concerns about patient's ability to self manage insulin and glucose monitoring at home, even with CGM due to poor hand dexterity. Would need assistance applying CGM sensor but most importantly, she appears to need assistance with once daily basal insulin as assessment by clinical pharmacist today, She is requiring significantly less than home reported basal insulin dose while well controlled glucose which is concerning for improper insulin administration at home.      If patient remains admitted overnight, would send both serum glucose and c-peptide level tomorrow morning to assess if patient can possibly be transitioned to oral DM2 medication regimen as outpatient.    Would consider internal medicine or rheumatology consult to rule out arthritis due to appearance of her fingers.     3) Case management and social work should see this patient. She reports living alone, being uncomfortable to return to living alone and to have difficulty self managing diabetes. Would assess if there are any family members to assist with medication at home once patient leaves rehab. Defer to primary team to coordinate consult.     Please refer to our clinical pharmacist's documentation today.     HTN  PLAN  - currently on amlodipine and losartan  - Can check urine microalbumin outpatient  - Outpatient goal BP <130/80  - management as per primary team     HLD  PLAN  - Continue atorvastatin 20mg daily per primary team/cardiology if no contraindications   - needs lipid panel outpt     Discussed about with primary team - Dr Curt Garcia, ANP-BC  Nurse Practitioner   Division of Endocrinology  Pager: SUKI pager [phone]    If out of hospital/unavailable when paged, please note: patient will be cared for by another provider on the endocrine service.  Please call the endocrine answering service for assistance to reach covering provider ([phone]). For non-urgent matters, please email [email or web address] for assistance.

## 2023-12-04 NOTE — PROGRESS NOTE ADULT - ATTENDING COMMENTS
Adhesive small bowel obstruction    a.  Patient with mild abdominal pain,  denies nausea and vomiting  b.  Note of flatus/bowel movements within the last 24 hours  c.  Remains hemodynamically normal and stable  d. Tolerating diet   e.  Wean intravenous fluid as needed  f.  Continue chemical DVT prophylaxis with SQH / Lovenox  g. Replete electrolytes as needed  h. Encourage ambulation  i.   Discharge planning

## 2023-12-04 NOTE — PROGRESS NOTE ADULT - SUBJECTIVE AND OBJECTIVE BOX
B TEAM SURGERY DAILY PROGRESS NOTE:     INTERVAL EVENTS: None    SUBJECTIVE/ROS: No acute events overnight. Patient seen and examined at bedside by surgical team. Reports feeling mild abdominal pain however persistent bowel function, denies N/V, fever/chills, sob/chest pain. Has not been ambulating given assistance needed.      OBJECTIVE:  Vital Signs Last 24 Hrs  T(C): 36.8 (04 Dec 2023 05:52), Max: 37.1 (03 Dec 2023 09:46)  T(F): 98.2 (04 Dec 2023 05:52), Max: 98.7 (03 Dec 2023 09:46)  HR: 82 (04 Dec 2023 05:52) (72 - 88)  BP: 150/62 (04 Dec 2023 05:52) (108/61 - 169/64)  BP(mean): --  RR: 18 (04 Dec 2023 05:52) (17 - 18)  SpO2: 97% (04 Dec 2023 05:52) (80% - 98%)    Parameters below as of 04 Dec 2023 05:52  Patient On (Oxygen Delivery Method): room air                            9.0    4.49  )-----------( 302      ( 04 Dec 2023 05:00 )             28.5     12-04    137  |  104  |  23  ----------------------------<  134<H>  4.3   |  22  |  0.98    Ca    8.8      04 Dec 2023 05:00  Phos  2.8     12-04  Mg     1.80     12-04       I&O's Detail    03 Dec 2023 07:01  -  04 Dec 2023 07:00  --------------------------------------------------------  IN:    Oral Fluid: 1100 mL  Total IN: 1100 mL    OUT:    Voided (mL): 0 mL  Total OUT: 0 mL    Total NET: 1100 mL          IMAGING:      PHYSICAL EXAM:  Constitutional: NAD  Respiratory: non-labored breathing, patent airway  Gastrointestinal: abdomen soft, nontender, slightly distended  Extremities: warm  Neurological: intact

## 2023-12-04 NOTE — PROGRESS NOTE ADULT - SUBJECTIVE AND OBJECTIVE BOX
Chief Complaint: DM 2 uncontrolled with hyperglycemia     History: Saw patient at bedside. She reports taking Lantus 60 units at home, managed by PCP. She reports she is able to self injection insulin via Pen at home but she is unable to check fingersticks due to dexterity issues. Our clinical pharmacist saw patient today - see documentation. Patient struggles with using insulin PEN due to poor dexterity She is currently only on Lantus 10 units at night along with Admelog low correctional scales only and has well controlled glucose.     MEDICATIONS  (STANDING):  albuterol    90 MICROgram(s) HFA Inhaler 2 Puff(s) Inhalation daily  amLODIPine   Tablet 5 milliGRAM(s) Oral daily  atorvastatin 20 milliGRAM(s) Oral at bedtime  dextrose 5%. 1000 milliLiter(s) (50 mL/Hr) IV Continuous <Continuous>  dextrose 50% Injectable 25 Gram(s) IV Push once  dextrose 50% Injectable 12.5 Gram(s) IV Push once  dextrose 50% Injectable 25 Gram(s) IV Push once  gabapentin 100 milliGRAM(s) Oral three times a day  heparin   Injectable 5000 Unit(s) SubCutaneous every 8 hours  insulin glargine Injectable (LANTUS) 10 Unit(s) SubCutaneous at bedtime  insulin lispro (ADMELOG) corrective regimen sliding scale   SubCutaneous three times a day before meals  insulin lispro (ADMELOG) corrective regimen sliding scale   SubCutaneous at bedtime  losartan 100 milliGRAM(s) Oral daily  montelukast 10 milliGRAM(s) Oral daily  pantoprazole    Tablet 40 milliGRAM(s) Oral before breakfast      Allergies    penicillin (Rash)      Review of Systems:  Constitutional: No fever  Cardiovascular: No chest pain, palpitations  Respiratory: No SOB, no cough  GI: No nausea, vomiting, abdominal pain  Endocrine: no polyuria, polydipsia      PHYSICAL EXAM:  Vital Signs Last 24 Hrs  T(C): 36.9 (04 Dec 2023 10:00), Max: 37 (03 Dec 2023 18:11)  T(F): 98.5 (04 Dec 2023 10:00), Max: 98.6 (03 Dec 2023 18:11)  HR: 69 (04 Dec 2023 10:00) (69 - 88)  BP: 113/55 (04 Dec 2023 10:32) (108/61 - 169/64)  BP(mean): --  RR: 18 (04 Dec 2023 10:00) (17 - 18)  SpO2: 99% (04 Dec 2023 10:00) (80% - 99%)    Parameters below as of 04 Dec 2023 10:00  Patient On (Oxygen Delivery Method): room air      GENERAL: NAD, laying in bed  EYES: No proptosis, no lid lag, anicteric  RESPIRATORY: Non labored respirations   CARDIOVASCULAR: B/L LE edema   GI: Soft, nontender, non distended  NEURO: extraocular movements intact, no tremor  MSK: B/L finger contractors noted  PSYCH: Alert and oriented x 3, normal affect, normal mood      CAPILLARY BLOOD GLUCOSE      POCT Blood Glucose.: 112 mg/dL (04 Dec 2023 08:26)  POCT Blood Glucose.: 180 mg/dL (03 Dec 2023 22:09)  POCT Blood Glucose.: 140 mg/dL (03 Dec 2023 17:04)  POCT Blood Glucose.: 136 mg/dL (03 Dec 2023 12:10)      12-04    137  |  104  |  23  ----------------------------<  134<H>  4.3   |  22  |  0.98    Ca    8.8      04 Dec 2023 05:00  Phos  2.8     12-04  Mg     1.80     12-04          A1C with Estimated Average Glucose Result: 8.3 % (11-23-23 @ 16:12)          Diet, Regular:   Consistent Carbohydrate Evening Snack (CSTCHOSN) (11-29-23 @ 08:16)

## 2023-12-04 NOTE — PHARMACOTHERAPY INTERVENTION NOTE - COMMENTS
Patient educated on A1c, goal A1c, basal insulin pen administration, hypoglycemia and treatment, goal blood glucose (BG), basics of using a glucometer, and when to check BG, patient had significant difficulties on return demonstration due to arthritis and poor dexterity. On insulin pen return demonstration, patient is unable to open alcohol swab or pen needle (unless she uses her teeth), counseled patient to do test dose (previously was not doing) but she did not have her glasses so could not see if the test dose came out; the patient was able to turn successfully to the dose and inject in the practice pad. Patient would benefit from alternative plan for discharge (going to rehab first) or from assistance from her family (especially so she doesn't use her teeth). Tried to have patient use practice glucometer but she is unable to open lancing device, insert lancet, and load the pen; she did state that she has family or friends come over to check bibiana blood glucose ~1x/day up to two times a day. Patient states she has had low blood glucose a couple times when she is not eating (previously on Lantus 30-60 units which is much higher than inpatient requirements). Informed endocrine NP of findings. Patient encouraged for outpatient f/u with medicine and endocrine.  Patient educated on A1c, goal A1c, basal insulin pen administration, hypoglycemia and treatment, goal blood glucose (BG), basics of using a glucometer, and when to check BG, patient had significant difficulties on return demonstration due to arthritis and poor dexterity. On insulin pen return demonstration, patient is unable to open alcohol swab or pen needle (unless she uses her teeth), counseled patient to do test dose (previously was not doing) but she did not have her glasses so could not see if the test dose came out; the patient was able to turn successfully to the dose and inject in the practice pad. Patient would benefit from alternative plan for discharge (going to rehab first) or from assistance from her family (especially so she doesn't use her teeth). Tried to have patient use practice glucometer but she is unable to open lancing device, insert lancet, and load the pen; she did state that she has family or friends come over to check bibiana blood glucose ~1x/day up to two times a day. Patient states she has had low blood glucose a couple times when she is not eating (previously on Lantus 30-60 units which is much higher than inpatient requirements). Informed endocrine NP of findings. Patient encouraged for outpatient f/u with medicine and endocrine.     Spoke over the phone with patient's sister Krystina - she states she does not check her sister's fingerstick everyday but is trying to get Dexcom from endocrinologist. She said she can help with the insulin but that the patient is able to do insulin (agreed that patient can do most of the steps but explained my concern that she is opening alcohol pads and pen needles with her teeth which is not safe and she cannot see the test dose). Unclear if her sister will help but she is very involved in her health. Her sister states she is also on ozempic 2 mG (which the sister administers once a week) and Farxiga 10 mG daily. Explained that if the patient is off Ozempic for >3 weeks, then they should talk to the endocrinologist about potentially lowering the dose to avoid side effects upon restarting. Her sister states her HbA1c has improved from >13% to 8.3%. Also explained that inpatient requirements are much lower than outpatient so they should follow discharge instructions from rehab.  Patient educated on A1c, goal A1c, basal insulin pen administration, hypoglycemia and treatment, goal blood glucose (BG), basics of using a glucometer, and when to check BG, patient had significant difficulties on return demonstration due to arthritis and poor dexterity. On insulin pen return demonstration, patient is unable to open alcohol swab or pen needle (unless she uses her teeth), counseled patient to do test dose (previously was not doing) but she did not have her glasses so could not see if the test dose came out; the patient was able to turn successfully to the dose and inject in the practice pad. Patient would benefit from alternative plan for discharge (going to rehab first) or from assistance from her family (especially so she doesn't use her teeth). Tried to have patient use practice glucometer but she is unable to open lancing device, insert lancet, and load the pen; she did state that she has family or friends come over to check bibiana blood glucose ~1x/day up to two times a day. Patient states she has had low blood glucose a couple times when she is not eating (previously on Lantus 30-60 units which is much higher than inpatient requirements). Informed endocrine NP of findings. Patient encouraged for outpatient f/u with medicine and endocrine.     Spoke over the phone with patient's sister Krystina - she states she does not check her sister's fingerstick everyday but is trying to get Dexcom from endocrinologist. She said she can help with the insulin but that the patient is able to do insulin (agreed that patient can do most of the steps but explained my concern that she is opening alcohol pads and pen needles with her teeth which is not safe and she cannot see the test dose). Unclear if her sister will help but she is very involved in her health. Her sister states she is also on ozempic 2 mG (which the sister administers once a week) and Farxiga 10 mG daily. Explained that if the patient is off Ozempic for >3 weeks, then they should talk to the endocrinologist about potentially lowering the dose to avoid side effects upon restarting. Her sister states her HbA1c has improved from >13% to 8.3%. Also explained that inpatient requirements are much lower than outpatient so they should follow discharge instructions from rehab.     12/5/23: Explained options per endocrine (basal + resume Farxiga and Ozempic) vs. (metformin + Farxiga + Ozempic). Patient states her cardiologist stopped metformin due to drug interaction? Explained usually metformin would be stopped for side effect or reduced kidney function. Per endocrine NP, patient's sister would be able to give Lantus pen every day if needed (at earlier time) due to patient's arthritis. Informed patient final discharge plan will depend on rehab physician's recommendations.

## 2023-12-04 NOTE — PROGRESS NOTE ADULT - ASSESSMENT
78F w/ pmh of hypertension, hyperlipidemia, diabetes on insulin, s/p  and AVINASH complaining of 2 days abdominal pain n/v. Pain is RLQ, associated with multiple episodes of large volume emesis. She is not passing flatus, last BM 2 days ago. She denies fevers/chills. Given resumed bowel function NGT was removed yesterday, however today she reports abdominal pain and one episode of emesis. AXR noted increased gas pattern and physical exam concerning for possible bowel obstruction. Patient with resumed bowel function and no further nausea, diet has been slowly advance with appropriate tolerance. Patient today will get insulin teaching. Afterwards patient ready for discharge to rehab.    Plan:  - Discharge planning  - Insulin pen teaching by clinical pharmacist  - Regular diet  - f/u endocrine recs for discharge  - Follow abdominal function  - PO meds  - Appreciate GYN recs  - Appreciate Endo recs  - DVTppx  - Dispo: Rehab     B team surgery [id number] 78F w/ pmh of hypertension, hyperlipidemia, diabetes on insulin, s/p  and AVINASH complaining of 2 days abdominal pain n/v. Pain is RLQ, associated with multiple episodes of large volume emesis. She is not passing flatus, last BM 2 days ago. She denies fevers/chills. Given resumed bowel function NGT was removed yesterday, however today she reports abdominal pain and one episode of emesis. AXR noted increased gas pattern and physical exam concerning for possible bowel obstruction. Patient with resumed bowel function and no further nausea, diet has been slowly advance with appropriate tolerance. Patient today will get insulin teaching. Afterwards patient ready for discharge to rehab.    Plan:  - Discharge planning  - Insulin pen teaching by clinical pharmacist  - Regular diet  - f/u endocrine recs for discharge  - Follow abdominal function  - PO meds  - Appreciate GYN recs  - Appreciate Endo recs  - DVTppx  - Dispo: Rehab     B team surgery [id number] 78F w/ pmh of hypertension, hyperlipidemia, diabetes on insulin, s/p  and AVINASH s/p resolved adhesive SBO with non-operative management. Patient with resumed bowel function and no further nausea, diet has been slowly advance with appropriate tolerance. Patient today will get insulin teaching. Afterwards patient ready for discharge to rehab.    Plan:  - Discharge planning  - Insulin pen teaching by clinical pharmacist  - Regular diet  - f/u endocrine recs for discharge  - Follow abdominal function  - PO meds  - Appreciate GYN recs  - Appreciate Endo recs  - DVTppx  - Dispo: Rehab     B team surgery [id number] 78F w/ pmh of hypertension, hyperlipidemia, diabetes on insulin, s/p  and AVINASH s/p resolved adhesive SBO with non-operative management. Patient with resumed bowel function and no further nausea, diet has been slowly advance with appropriate tolerance. Patient today will get insulin teaching. Afterwards patient ready for discharge to rehab.    Plan:  - Discharge planning  - Insulin pen teaching by clinical pharmacist  - Regular diet  - f/u endocrine recs for discharge  - Follow abdominal function  - PO meds  - Appreciate GYN recs  - Appreciate Endo recs  - DVTppx  - Dispo: Rehab     B team surgery [id number]

## 2023-12-05 ENCOUNTER — TRANSCRIPTION ENCOUNTER (OUTPATIENT)
Age: 78
End: 2023-12-05

## 2023-12-05 VITALS
TEMPERATURE: 98 F | OXYGEN SATURATION: 99 % | RESPIRATION RATE: 17 BRPM | HEART RATE: 75 BPM | SYSTOLIC BLOOD PRESSURE: 145 MMHG | DIASTOLIC BLOOD PRESSURE: 62 MMHG

## 2023-12-05 LAB
ANION GAP SERPL CALC-SCNC: 8 MMOL/L — SIGNIFICANT CHANGE UP (ref 7–14)
ANION GAP SERPL CALC-SCNC: 8 MMOL/L — SIGNIFICANT CHANGE UP (ref 7–14)
BUN SERPL-MCNC: 26 MG/DL — HIGH (ref 7–23)
BUN SERPL-MCNC: 26 MG/DL — HIGH (ref 7–23)
C PEPTIDE SERPL-MCNC: 2 NG/ML — SIGNIFICANT CHANGE UP (ref 1.1–4.4)
C PEPTIDE SERPL-MCNC: 2 NG/ML — SIGNIFICANT CHANGE UP (ref 1.1–4.4)
CALCIUM SERPL-MCNC: 8.8 MG/DL — SIGNIFICANT CHANGE UP (ref 8.4–10.5)
CALCIUM SERPL-MCNC: 8.8 MG/DL — SIGNIFICANT CHANGE UP (ref 8.4–10.5)
CHLORIDE SERPL-SCNC: 104 MMOL/L — SIGNIFICANT CHANGE UP (ref 98–107)
CHLORIDE SERPL-SCNC: 104 MMOL/L — SIGNIFICANT CHANGE UP (ref 98–107)
CO2 SERPL-SCNC: 25 MMOL/L — SIGNIFICANT CHANGE UP (ref 22–31)
CO2 SERPL-SCNC: 25 MMOL/L — SIGNIFICANT CHANGE UP (ref 22–31)
CREAT SERPL-MCNC: 0.94 MG/DL — SIGNIFICANT CHANGE UP (ref 0.5–1.3)
CREAT SERPL-MCNC: 0.94 MG/DL — SIGNIFICANT CHANGE UP (ref 0.5–1.3)
EGFR: 62 ML/MIN/1.73M2 — SIGNIFICANT CHANGE UP
EGFR: 62 ML/MIN/1.73M2 — SIGNIFICANT CHANGE UP
GLUCOSE BLDC GLUCOMTR-MCNC: 123 MG/DL — HIGH (ref 70–99)
GLUCOSE BLDC GLUCOMTR-MCNC: 123 MG/DL — HIGH (ref 70–99)
GLUCOSE BLDC GLUCOMTR-MCNC: 171 MG/DL — HIGH (ref 70–99)
GLUCOSE BLDC GLUCOMTR-MCNC: 171 MG/DL — HIGH (ref 70–99)
GLUCOSE SERPL-MCNC: 127 MG/DL — HIGH (ref 70–99)
GLUCOSE SERPL-MCNC: 127 MG/DL — HIGH (ref 70–99)
HCT VFR BLD CALC: 27.4 % — LOW (ref 34.5–45)
HCT VFR BLD CALC: 27.4 % — LOW (ref 34.5–45)
HGB BLD-MCNC: 8.7 G/DL — LOW (ref 11.5–15.5)
HGB BLD-MCNC: 8.7 G/DL — LOW (ref 11.5–15.5)
MAGNESIUM SERPL-MCNC: 1.8 MG/DL — SIGNIFICANT CHANGE UP (ref 1.6–2.6)
MAGNESIUM SERPL-MCNC: 1.8 MG/DL — SIGNIFICANT CHANGE UP (ref 1.6–2.6)
MCHC RBC-ENTMCNC: 29.8 PG — SIGNIFICANT CHANGE UP (ref 27–34)
MCHC RBC-ENTMCNC: 29.8 PG — SIGNIFICANT CHANGE UP (ref 27–34)
MCHC RBC-ENTMCNC: 31.8 GM/DL — LOW (ref 32–36)
MCHC RBC-ENTMCNC: 31.8 GM/DL — LOW (ref 32–36)
MCV RBC AUTO: 93.8 FL — SIGNIFICANT CHANGE UP (ref 80–100)
MCV RBC AUTO: 93.8 FL — SIGNIFICANT CHANGE UP (ref 80–100)
NRBC # BLD: 0 /100 WBCS — SIGNIFICANT CHANGE UP (ref 0–0)
NRBC # BLD: 0 /100 WBCS — SIGNIFICANT CHANGE UP (ref 0–0)
NRBC # FLD: 0 K/UL — SIGNIFICANT CHANGE UP (ref 0–0)
NRBC # FLD: 0 K/UL — SIGNIFICANT CHANGE UP (ref 0–0)
PHOSPHATE SERPL-MCNC: 2.9 MG/DL — SIGNIFICANT CHANGE UP (ref 2.5–4.5)
PHOSPHATE SERPL-MCNC: 2.9 MG/DL — SIGNIFICANT CHANGE UP (ref 2.5–4.5)
PLATELET # BLD AUTO: 337 K/UL — SIGNIFICANT CHANGE UP (ref 150–400)
PLATELET # BLD AUTO: 337 K/UL — SIGNIFICANT CHANGE UP (ref 150–400)
POTASSIUM SERPL-MCNC: 4.9 MMOL/L — SIGNIFICANT CHANGE UP (ref 3.5–5.3)
POTASSIUM SERPL-MCNC: 4.9 MMOL/L — SIGNIFICANT CHANGE UP (ref 3.5–5.3)
POTASSIUM SERPL-SCNC: 4.9 MMOL/L — SIGNIFICANT CHANGE UP (ref 3.5–5.3)
POTASSIUM SERPL-SCNC: 4.9 MMOL/L — SIGNIFICANT CHANGE UP (ref 3.5–5.3)
RBC # BLD: 2.92 M/UL — LOW (ref 3.8–5.2)
RBC # BLD: 2.92 M/UL — LOW (ref 3.8–5.2)
RBC # FLD: 12.9 % — SIGNIFICANT CHANGE UP (ref 10.3–14.5)
RBC # FLD: 12.9 % — SIGNIFICANT CHANGE UP (ref 10.3–14.5)
SODIUM SERPL-SCNC: 137 MMOL/L — SIGNIFICANT CHANGE UP (ref 135–145)
SODIUM SERPL-SCNC: 137 MMOL/L — SIGNIFICANT CHANGE UP (ref 135–145)
WBC # BLD: 5.29 K/UL — SIGNIFICANT CHANGE UP (ref 3.8–10.5)
WBC # BLD: 5.29 K/UL — SIGNIFICANT CHANGE UP (ref 3.8–10.5)
WBC # FLD AUTO: 5.29 K/UL — SIGNIFICANT CHANGE UP (ref 3.8–10.5)
WBC # FLD AUTO: 5.29 K/UL — SIGNIFICANT CHANGE UP (ref 3.8–10.5)

## 2023-12-05 PROCEDURE — 99232 SBSQ HOSP IP/OBS MODERATE 35: CPT

## 2023-12-05 PROCEDURE — 99232 SBSQ HOSP IP/OBS MODERATE 35: CPT | Mod: GC

## 2023-12-05 RX ORDER — SEMAGLUTIDE 0.68 MG/ML
2 INJECTION, SOLUTION SUBCUTANEOUS
Refills: 0 | DISCHARGE

## 2023-12-05 RX ORDER — INSULIN GLARGINE 100 [IU]/ML
60 INJECTION, SOLUTION SUBCUTANEOUS
Refills: 0 | DISCHARGE

## 2023-12-05 RX ORDER — ACETAMINOPHEN 500 MG
3 TABLET ORAL
Qty: 0 | Refills: 0 | DISCHARGE
Start: 2023-12-05

## 2023-12-05 RX ORDER — FUROSEMIDE 40 MG
1 TABLET ORAL
Refills: 0 | DISCHARGE

## 2023-12-05 RX ORDER — INSULIN LISPRO 100/ML
1 VIAL (ML) SUBCUTANEOUS
Qty: 1 | Refills: 0
Start: 2023-12-05

## 2023-12-05 RX ORDER — INSULIN GLARGINE 100 [IU]/ML
10 INJECTION, SOLUTION SUBCUTANEOUS
Qty: 3 | Refills: 0
Start: 2023-12-05

## 2023-12-05 RX ORDER — IBUPROFEN 200 MG
400 TABLET ORAL ONCE
Refills: 0 | Status: COMPLETED | OUTPATIENT
Start: 2023-12-05 | End: 2023-12-05

## 2023-12-05 RX ORDER — DAPAGLIFLOZIN 10 MG/1
1 TABLET, FILM COATED ORAL
Refills: 0 | DISCHARGE

## 2023-12-05 RX ADMIN — MONTELUKAST 10 MILLIGRAM(S): 4 TABLET, CHEWABLE ORAL at 11:57

## 2023-12-05 RX ADMIN — ALBUTEROL 2 PUFF(S): 90 AEROSOL, METERED ORAL at 11:57

## 2023-12-05 RX ADMIN — PANTOPRAZOLE SODIUM 40 MILLIGRAM(S): 20 TABLET, DELAYED RELEASE ORAL at 05:52

## 2023-12-05 RX ADMIN — AMLODIPINE BESYLATE 5 MILLIGRAM(S): 2.5 TABLET ORAL at 05:51

## 2023-12-05 RX ADMIN — Medication 400 MILLIGRAM(S): at 07:51

## 2023-12-05 RX ADMIN — Medication 1: at 12:09

## 2023-12-05 RX ADMIN — LOSARTAN POTASSIUM 100 MILLIGRAM(S): 100 TABLET, FILM COATED ORAL at 05:52

## 2023-12-05 RX ADMIN — GABAPENTIN 100 MILLIGRAM(S): 400 CAPSULE ORAL at 14:12

## 2023-12-05 RX ADMIN — Medication 10 MILLIGRAM(S): at 11:57

## 2023-12-05 RX ADMIN — GABAPENTIN 100 MILLIGRAM(S): 400 CAPSULE ORAL at 05:51

## 2023-12-05 RX ADMIN — HEPARIN SODIUM 5000 UNIT(S): 5000 INJECTION INTRAVENOUS; SUBCUTANEOUS at 14:12

## 2023-12-05 RX ADMIN — HEPARIN SODIUM 5000 UNIT(S): 5000 INJECTION INTRAVENOUS; SUBCUTANEOUS at 05:51

## 2023-12-05 NOTE — PROGRESS NOTE ADULT - ASSESSMENT
78F w/ pmh of hypertension, hyperlipidemia, diabetes on insulin, s/p  and AVINASH complaining of 2 days abdominal pain n/v with concern for SBO, with minimal po intake   endocrine called for DM   a1c 8.3    Poorly controlled T2DM with hyperglycemia  - HbA1c: 8  - Home Regimen:  Patient reports Lantus 60 units at bedtime however, if only requiring 10 units at this time. Our pharmacist confirmed that patient was also on Ozempic 2 mg weekly and Farxiga 10 mg daily.     Recent hypoglycemia on this admission    PLAN  - Consider internal medicine consult or rheumatology consult to rule out arthritis given hand/finger deformity.  This patient reports living alone with decreased dexterity to manage diabetes which is concerning for safety for rehabilitation. See clinical pharmacist assessment today: patient will need assistance with once daily basal insulin along with fingerstick or CGM monitoring in outpatient setting.     While inpatient and for rehab:  - Continue Lantus 10 units at bedtime. DO NOT HOLD IF NPO.  - Continue low dose Admelog correction scale pre-meal  - Continue low dose Admelog correction scale at bedtime  - Fingerstick BG before meals and bedtime  - Goal -180: stable today   - Carbohydrate consistent diet  - RD consult attempted but commented as terminated - see note   -Transitions of care pharmacist following: please refer to pharmacotherapy note       Discharge plan:    1) To rehab: Lantus 10 units at bedtime along with Admelog correctional scales are currently ordered:  Admelog correctional scale before meals:   1 Unit(s) if Glucose 151 - 200  2 Unit(s) if Glucose 201 - 250  3 Unit(s) if Glucose 251 - 300  4 Unit(s) if Glucose 301 - 350  5 Unit(s) if Glucose 351 - 400  6 Unit(s) if Glucose Greater Than 400  and  Admelog correctional scale before bedtime  0 Unit(s) if Glucose 0 - 250  1 Unit(s) if Glucose 251 - 300  2 Unit(s) if Glucose 301 - 350  3 Unit(s) if Glucose 351 - 400  4 Unit(s) if Glucose Greater Than 400  C-peptide 2.0 (pt producing endogenous insulin) serum glucose 127; pt could possibly be off insulin and switched to an oral regimen from rehab to home of Farxiga 10mg p.o. Daily, Metformin (dose TBD based on GFR), And Ozempic 2mg sq weekly (sister administers); Rehab to home DM regimen should be determined based on glucose trend at Rehab.   Please follow up with Dr. Te Saucedo [phone]; [address] [facility] [address] (as per Sister this is pts endocrinologist)   D/W sister Krystina Levine [phone]    2) Concerns about patient's ability to self manage insulin and glucose monitoring at home, even with CGM due to poor hand dexterity. Would need assistance applying CGM sensor but most importantly, she appears to need assistance with once daily basal insulin as assessment by clinical pharmacist today, She is requiring significantly less than home reported basal insulin dose while well controlled glucose which is concerning for improper insulin administration at home.      Would consider internal medicine or rheumatology consult to rule out arthritis due to appearance of her fingers.     3) Case management and social work should see this patient. She reports living alone, being uncomfortable to return to living alone and to have difficulty self managing diabetes. Would assess if there are any family members to assist with medication at home once patient leaves rehab. Defer to primary team to coordinate consult.     Please refer to our clinical pharmacist's documentation.    HTN  PLAN  - currently on amlodipine and losartan  - Can check urine microalbumin outpatient  - Outpatient goal BP <130/80  - management as per primary team     HLD  PLAN  - Continue atorvastatin 20mg daily per primary team/cardiology if no contraindications   - needs lipid panel outpt     D/ Surgery team [id number]    Christine Awad  Nurse Practitioner  Division of Endocrinology & Diabetes  In house pager #[phone]    If before 9AM or after 6PM, or on weekends/holidays, please call endocrine answering service for assistance ([phone]).For nonurgent matters email [email or web address] for assistance.          78F w/ pmh of hypertension, hyperlipidemia, diabetes on insulin, s/p  and AVINASH complaining of 2 days abdominal pain n/v with concern for SBO, with minimal po intake   endocrine called for DM   a1c 8.3    Poorly controlled T2DM with hyperglycemia  - HbA1c: 8  - Home Regimen:  Patient reports Lantus 60 units at bedtime however, if only requiring 10 units at this time. Our pharmacist confirmed that patient was also on Ozempic 2 mg weekly and Farxiga 10 mg daily.     Recent hypoglycemia on this admission    PLAN  - Consider internal medicine consult or rheumatology consult to rule out arthritis given hand/finger deformity.  This patient reports living alone with decreased dexterity to manage diabetes which is concerning for safety for rehabilitation. See clinical pharmacist assessment today: patient will need assistance with once daily basal insulin along with fingerstick or CGM monitoring in outpatient setting.     While inpatient and for rehab:  - Continue Lantus 10 units at bedtime. DO NOT HOLD IF NPO.  - Continue low dose Admelog correction scale pre-meal  - Continue low dose Admelog correction scale at bedtime  - Fingerstick BG before meals and bedtime  - Goal -180: stable today   - Carbohydrate consistent diet  - RD consult attempted but commented as terminated - see note   -Transitions of care pharmacist following: please refer to pharmacotherapy note       Discharge plan:    1) To rehab: Lantus 10 units at bedtime along with Admelog correctional scales are currently ordered:  Admelog correctional scale before meals:   1 Unit(s) if Glucose 151 - 200  2 Unit(s) if Glucose 201 - 250  3 Unit(s) if Glucose 251 - 300  4 Unit(s) if Glucose 301 - 350  5 Unit(s) if Glucose 351 - 400  6 Unit(s) if Glucose Greater Than 400  and  Admelog correctional scale before bedtime  0 Unit(s) if Glucose 0 - 250  1 Unit(s) if Glucose 251 - 300  2 Unit(s) if Glucose 301 - 350  3 Unit(s) if Glucose 351 - 400  4 Unit(s) if Glucose Greater Than 400  C-peptide 2.0 (pt producing endogenous insulin) serum glucose 127; pt could possibly be off insulin and switched to an oral regimen from rehab to home of Farxiga 10mg p.o. Daily, Metformin (dose TBD based on GFR), And Ozempic 2mg sq weekly (sister administers); Rehab to home DM regimen should be determined based on glucose trend at Rehab.   Please follow up with Dr. Te Saucedo [phone]; [address] [facility] [address] (as per Sister this is pts endocrinologist)   D/W sister Krystina Levine [phone]    2) Concerns about patient's ability to self manage insulin and glucose monitoring at home, even with CGM due to poor hand dexterity. Would need assistance applying CGM sensor but most importantly, she appears to need assistance with once daily basal insulin as assessment by clinical pharmacist today, She is requiring significantly less than home reported basal insulin dose while well controlled glucose which is concerning for improper insulin administration at home.      Would consider internal medicine or rheumatology consult to rule out arthritis due to appearance of her fingers.     3) Case management and social work should see this patient. She reports living alone, being uncomfortable to return to living alone and to have difficulty self managing diabetes. Would assess if there are any family members to assist with medication at home once patient leaves rehab. Defer to primary team to coordinate consult.     Please refer to our clinical pharmacist's documentation.    HTN  PLAN  - currently on amlodipine and losartan  - Can check urine microalbumin outpatient  - Outpatient goal BP <130/80  - management as per primary team     HLD  PLAN  - Continue atorvastatin 20mg daily per primary team/cardiology if no contraindications   - needs lipid panel outpt     D/ Surgery team [id number]    Christine Awad  Nurse Practitioner  Division of Endocrinology & Diabetes  In house pager #[phone]    If before 9AM or after 6PM, or on weekends/holidays, please call endocrine answering service for assistance ([phone]).For nonurgent matters email [email or web address] for assistance.          78F w/ pmh of hypertension, hyperlipidemia, diabetes on insulin, s/p  and AVINASH complaining of 2 days abdominal pain n/v with concern for SBO, with minimal po intake   endocrine called for DM   a1c 8.3    Poorly controlled T2DM with hyperglycemia  - HbA1c: 8  - Home Regimen:  Patient reports Lantus 60 units at bedtime however, if only requiring 10 units at this time. Our pharmacist confirmed that patient was also on Ozempic 2 mg weekly and Farxiga 10 mg daily.     Recent hypoglycemia on this admission    PLAN  - Consider internal medicine consult or rheumatology consult to rule out arthritis given hand/finger deformity.  This patient reports living alone with decreased dexterity to manage diabetes which is concerning for safety for rehabilitation. See clinical pharmacist assessment today: patient will need assistance with once daily basal insulin along with fingerstick or CGM monitoring in outpatient setting.     While inpatient and for rehab:  - Continue Lantus 10 units at bedtime. DO NOT HOLD IF NPO.  - Continue low dose Admelog correction scale pre-meal  - Continue low dose Admelog correction scale at bedtime  - Fingerstick BG before meals and bedtime  - Goal -180: stable today   - Carbohydrate consistent diet  - RD consult attempted but commented as terminated - see note   -Transitions of care pharmacist following: please refer to pharmacotherapy note       Discharge plan:    1) To rehab: Lantus 10 units at bedtime along with Admelog correctional scales are currently ordered:  Admelog correctional scale before meals:   1 Unit(s) if Glucose 151 - 200  2 Unit(s) if Glucose 201 - 250  3 Unit(s) if Glucose 251 - 300  4 Unit(s) if Glucose 301 - 350  5 Unit(s) if Glucose 351 - 400  6 Unit(s) if Glucose Greater Than 400  and  Admelog correctional scale before bedtime  0 Unit(s) if Glucose 0 - 250  1 Unit(s) if Glucose 251 - 300  2 Unit(s) if Glucose 301 - 350  3 Unit(s) if Glucose 351 - 400  4 Unit(s) if Glucose Greater Than 400  -C-peptide 2.0 (pt producing endogenous insulin) serum glucose 127; pt could possibly be off insulin and switched to an oral regimen from rehab to home; tentative plan could be Farxiga 10mg p.o. Daily, Metformin (dose TBD based on GFR), And Ozempic 2mg sq weekly (sister administers); If must go home on insulin would defer restarting farxiga and ozempic once follow up with Endocrinologist.  As per Sister she is able to give lantus once a day for pt.  -Rehab to home DM regimen should be determined based on glucose trend at Rehab.   -Please follow up with Dr. Te Saucedo [phone]; [address] [facility] [address] (as per Sister this is pts endocrinologist)   D/W sister Krystina Levine [phone]    2) Concerns about patient's ability to self manage insulin and glucose monitoring at home, even with CGM due to poor hand dexterity. Would need assistance applying CGM sensor but most importantly, she appears to need assistance with once daily basal insulin as assessment by clinical pharmacist today, She is requiring significantly less than home reported basal insulin dose while well controlled glucose which is concerning for improper insulin administration at home.      Would consider internal medicine or rheumatology consult to rule out arthritis due to appearance of her fingers.     3) Case management and social work should see this patient. She reports living alone, being uncomfortable to return to living alone and to have difficulty self managing diabetes. Would assess if there are any family members to assist with medication at home once patient leaves rehab. Defer to primary team to coordinate consult.     Please refer to our clinical pharmacist's documentation.    HTN  PLAN  - currently on amlodipine and losartan  - Can check urine microalbumin outpatient  - Outpatient goal BP <130/80  - management as per primary team     HLD  PLAN  - Continue atorvastatin 20mg daily per primary team/cardiology if no contraindications   - needs lipid panel outpt     D/ Surgery team [id number]    Christine Awad  Nurse Practitioner  Division of Endocrinology & Diabetes  In house pager #[phone]    If before 9AM or after 6PM, or on weekends/holidays, please call endocrine answering service for assistance ([phone]).For nonurgent matters email [email or web address] for assistance.          78F w/ pmh of hypertension, hyperlipidemia, diabetes on insulin, s/p  and AVINASH complaining of 2 days abdominal pain n/v with concern for SBO, with minimal po intake   endocrine called for DM   a1c 8.3    Poorly controlled T2DM with hyperglycemia  - HbA1c: 8  - Home Regimen:  Patient reports Lantus 60 units at bedtime however, if only requiring 10 units at this time. Our pharmacist confirmed that patient was also on Ozempic 2 mg weekly and Farxiga 10 mg daily.     Recent hypoglycemia on this admission    PLAN  - Consider internal medicine consult or rheumatology consult to rule out arthritis given hand/finger deformity.  This patient reports living alone with decreased dexterity to manage diabetes which is concerning for safety for rehabilitation. See clinical pharmacist assessment today: patient will need assistance with once daily basal insulin along with fingerstick or CGM monitoring in outpatient setting.     While inpatient and for rehab:  - Continue Lantus 10 units at bedtime. DO NOT HOLD IF NPO.  - Continue low dose Admelog correction scale pre-meal  - Continue low dose Admelog correction scale at bedtime  - Fingerstick BG before meals and bedtime  - Goal -180: stable today   - Carbohydrate consistent diet  - RD consult attempted but commented as terminated - see note   -Transitions of care pharmacist following: please refer to pharmacotherapy note       Discharge plan:    1) To rehab: Lantus 10 units at bedtime along with Admelog correctional scales are currently ordered:  Admelog correctional scale before meals:   1 Unit(s) if Glucose 151 - 200  2 Unit(s) if Glucose 201 - 250  3 Unit(s) if Glucose 251 - 300  4 Unit(s) if Glucose 301 - 350  5 Unit(s) if Glucose 351 - 400  6 Unit(s) if Glucose Greater Than 400  and  Admelog correctional scale before bedtime  0 Unit(s) if Glucose 0 - 250  1 Unit(s) if Glucose 251 - 300  2 Unit(s) if Glucose 301 - 350  3 Unit(s) if Glucose 351 - 400  4 Unit(s) if Glucose Greater Than 400  -C-peptide 2.0 (pt producing endogenous insulin) serum glucose 127; pt could possibly be off insulin and switched to an oral regimen from rehab to home; tentative plan could be Farxiga 10mg p.o. Daily, Metformin (dose TBD based on GFR), And Ozempic 2mg sq weekly (sister administers); If must go home on insulin would defer restarting farxiga and ozempic once follow up with Endocrinologist.  As per Sister she is able to give lantus once a day for pt.  -Rehab to home DM regimen should be determined based on glucose trend at Rehab.   -Please follow up with Dr. Te Saucedo [phone]; [address] [facility] [address] (as per Sister this is pts endocrinologist)   D/W sister Krystina Levine [phone]    2) Concerns about patient's ability to self manage insulin and glucose monitoring at home, even with CGM due to poor hand dexterity. Would need assistance applying CGM sensor but most importantly, she appears to need assistance with once daily basal insulin as assessment by clinical pharmacist today, She is requiring significantly less than home reported basal insulin dose while well controlled glucose which is concerning for improper insulin administration at home.      Would consider internal medicine or rheumatology consult to rule out arthritis due to appearance of her fingers.     3) Case management and social work should see this patient. She reports living alone, being uncomfortable to return to living alone and to have difficulty self managing diabetes. Would assess if there are any family members to assist with medication at home once patient leaves rehab. Defer to primary team to coordinate consult.     Please refer to our clinical pharmacist's documentation.    HTN  PLAN  - currently on amlodipine and losartan  - Can check urine microalbumin outpatient  - Outpatient goal BP <130/80  - management as per primary team     HLD  PLAN  - Continue atorvastatin 20mg daily per primary team/cardiology if no contraindications   - needs lipid panel outpt     D/ Surgery team [id number]    Christine Awad  Nurse Practitioner  Division of Endocrinology & Diabetes  In house pager #[phone]    If before 9AM or after 6PM, or on weekends/holidays, please call endocrine answering service for assistance ([phone]).For nonurgent matters email [email or web address] for assistance.          78F w/ pmh of hypertension, hyperlipidemia, diabetes on insulin, s/p  and AVINASH complaining of 2 days abdominal pain n/v with concern for SBO, with minimal po intake   endocrine called for DM   a1c 8.3    Poorly controlled T2DM with hyperglycemia  - HbA1c: 8  - Home Regimen:  Patient reports Lantus 60 units at bedtime however, if only requiring 10 units at this time. Our pharmacist confirmed that patient was also on Ozempic 2 mg weekly and Farxiga 10 mg daily.     Recent hypoglycemia on this admission    PLAN  - Consider internal medicine consult or rheumatology consult to rule out arthritis given hand/finger deformity.  This patient reports living alone with decreased dexterity to manage diabetes which is concerning for safety for rehabilitation. See clinical pharmacist assessment today: patient will need assistance with once daily basal insulin along with fingerstick or CGM monitoring in outpatient setting.     While inpatient and for rehab:  - Continue Lantus 10 units at bedtime. DO NOT HOLD IF NPO.  - Continue low dose Admelog correction scale pre-meal  - Continue low dose Admelog correction scale at bedtime  - Fingerstick BG before meals and bedtime  - Goal -180: stable today   - Carbohydrate consistent diet  - RD consult attempted but commented as terminated - see note   -Transitions of care pharmacist following: please refer to pharmacotherapy note       Discharge plan:    1) To rehab: Lantus 10 units at bedtime along with Admelog correctional scales are currently ordered:  Admelog correctional scale before meals:   1 Unit(s) if Glucose 151 - 200  2 Unit(s) if Glucose 201 - 250  3 Unit(s) if Glucose 251 - 300  4 Unit(s) if Glucose 301 - 350  5 Unit(s) if Glucose 351 - 400  6 Unit(s) if Glucose Greater Than 400  and  Admelog correctional scale before bedtime  0 Unit(s) if Glucose 0 - 250  1 Unit(s) if Glucose 251 - 300  2 Unit(s) if Glucose 301 - 350  3 Unit(s) if Glucose 351 - 400  4 Unit(s) if Glucose Greater Than 400  -C-peptide 2.0 (pt producing endogenous insulin) serum glucose 127; pt could possibly be off insulin and switched to an oral regimen from rehab to home; tentative plan could be Farxiga 10mg p.o. Daily, Metformin (dose TBD based on GFR), And Ozempic 2mg sq weekly (sister administers); If must go home on insulin; would not start metformin and would defer restarting farxiga and ozempic once follow up with Endocrinologist.  As per Sister she is able to give lantus once a day for pt.  -Rehab to home DM regimen should be determined based on glucose trend at Rehab.   -Please follow up with Dr. Te Saucedo [phone]; [address] [facility] [address] (as per Sister this is pts endocrinologist)   D/W sister Krystina Levine [phone]    2) Concerns about patient's ability to self manage insulin and glucose monitoring at home, even with CGM due to poor hand dexterity. Would need assistance applying CGM sensor but most importantly, she appears to need assistance with once daily basal insulin as assessment by clinical pharmacist today, She is requiring significantly less than home reported basal insulin dose while well controlled glucose which is concerning for improper insulin administration at home.      Would consider internal medicine or rheumatology consult to rule out arthritis due to appearance of her fingers.     3) Case management and social work should see this patient. She reports living alone, being uncomfortable to return to living alone and to have difficulty self managing diabetes. Would assess if there are any family members to assist with medication at home once patient leaves rehab. Defer to primary team to coordinate consult.     Please refer to our clinical pharmacist's documentation.    HTN  PLAN  - currently on amlodipine and losartan  - Can check urine microalbumin outpatient  - Outpatient goal BP <130/80  - management as per primary team     HLD  PLAN  - Continue atorvastatin 20mg daily per primary team/cardiology if no contraindications   - needs lipid panel outpt     D/ Surgery team [id number]    Christine Awad  Nurse Practitioner  Division of Endocrinology & Diabetes  In house pager #[phone]    If before 9AM or after 6PM, or on weekends/holidays, please call endocrine answering service for assistance ([phone]).For nonurgent matters email [email or web address] for assistance.          78F w/ pmh of hypertension, hyperlipidemia, diabetes on insulin, s/p  and AVINASH complaining of 2 days abdominal pain n/v with concern for SBO, with minimal po intake   endocrine called for DM   a1c 8.3    Poorly controlled T2DM with hyperglycemia  - HbA1c: 8  - Home Regimen:  Patient reports Lantus 60 units at bedtime however, if only requiring 10 units at this time. Our pharmacist confirmed that patient was also on Ozempic 2 mg weekly and Farxiga 10 mg daily.     Recent hypoglycemia on this admission    PLAN  - Consider internal medicine consult or rheumatology consult to rule out arthritis given hand/finger deformity.  This patient reports living alone with decreased dexterity to manage diabetes which is concerning for safety for rehabilitation. See clinical pharmacist assessment today: patient will need assistance with once daily basal insulin along with fingerstick or CGM monitoring in outpatient setting.     While inpatient and for rehab:  - Continue Lantus 10 units at bedtime. DO NOT HOLD IF NPO.  - Continue low dose Admelog correction scale pre-meal  - Continue low dose Admelog correction scale at bedtime  - Fingerstick BG before meals and bedtime  - Goal -180: stable today   - Carbohydrate consistent diet  - RD consult attempted but commented as terminated - see note   -Transitions of care pharmacist following: please refer to pharmacotherapy note       Discharge plan:    1) To rehab: Lantus 10 units at bedtime along with Admelog correctional scales are currently ordered:  Admelog correctional scale before meals:   1 Unit(s) if Glucose 151 - 200  2 Unit(s) if Glucose 201 - 250  3 Unit(s) if Glucose 251 - 300  4 Unit(s) if Glucose 301 - 350  5 Unit(s) if Glucose 351 - 400  6 Unit(s) if Glucose Greater Than 400  and  Admelog correctional scale before bedtime  0 Unit(s) if Glucose 0 - 250  1 Unit(s) if Glucose 251 - 300  2 Unit(s) if Glucose 301 - 350  3 Unit(s) if Glucose 351 - 400  4 Unit(s) if Glucose Greater Than 400  -C-peptide 2.0 (pt producing endogenous insulin) serum glucose 127; pt could possibly be off insulin and switched to an oral regimen from rehab to home; tentative plan could be Farxiga 10mg p.o. Daily, Metformin (dose TBD based on GFR), And Ozempic 2mg sq weekly (sister administers); If must go home on insulin; would not start metformin and would defer restarting farxiga and ozempic once follow up with Endocrinologist.  As per Sister she is able to give lantus once a day for pt.  -Rehab to home DM regimen should be determined based on glucose trend at Rehab.   -Please follow up with Dr. Te Saucedo [phone]; [address] [facility] [address] (as per Sister this is pts endocrinologist)   D/W sister Krystina Levine [phone]    2) Concerns about patient's ability to self manage insulin and glucose monitoring at home, even with CGM due to poor hand dexterity. Would need assistance applying CGM sensor but most importantly, she appears to need assistance with once daily basal insulin as assessment by clinical pharmacist today, She is requiring significantly less than home reported basal insulin dose while well controlled glucose which is concerning for improper insulin administration at home.      Would consider internal medicine or rheumatology consult to rule out arthritis due to appearance of her fingers.     3) Case management and social work should see this patient. She reports living alone, being uncomfortable to return to living alone and to have difficulty self managing diabetes. Would assess if there are any family members to assist with medication at home once patient leaves rehab. Defer to primary team to coordinate consult.     Please refer to our clinical pharmacist's documentation.    HTN  PLAN  - currently on amlodipine and losartan  - Can check urine microalbumin outpatient  - Outpatient goal BP <130/80  - management as per primary team     HLD  PLAN  - Continue atorvastatin 20mg daily per primary team/cardiology if no contraindications   - needs lipid panel outpt     D/ Surgery team [id number]    Christine Awad  Nurse Practitioner  Division of Endocrinology & Diabetes  In house pager #[phone]    If before 9AM or after 6PM, or on weekends/holidays, please call endocrine answering service for assistance ([phone]).For nonurgent matters email [email or web address] for assistance.          78F w/ pmh of hypertension, hyperlipidemia, diabetes on insulin, s/p  and AVINASH complaining of 2 days abdominal pain n/v with concern for SBO, with minimal po intake   endocrine called for DM   a1c 8.3    Poorly controlled T2DM with hyperglycemia  - HbA1c: 8  - Home Regimen:  Patient reports Lantus 60 units at bedtime however, if only requiring 10 units at this time. Our pharmacist confirmed that patient was also on Ozempic 2 mg weekly and Farxiga 10 mg daily.     Recent hypoglycemia on this admission    PLAN  - Consider internal medicine consult or rheumatology consult to rule out arthritis given hand/finger deformity.  This patient reports living alone with decreased dexterity to manage diabetes which is concerning for safety for rehabilitation. See clinical pharmacist assessment today: patient will need assistance with once daily basal insulin along with fingerstick or CGM monitoring in outpatient setting.     While inpatient and for rehab:  - Continue Lantus 10 units at bedtime. DO NOT HOLD IF NPO.  - Continue low dose Admelog correction scale pre-meal  - Continue low dose Admelog correction scale at bedtime  - Fingerstick BG before meals and bedtime  - Goal -180: stable today   - Carbohydrate consistent diet  - RD consult attempted but commented as terminated - see note   -Transitions of care pharmacist following: please refer to pharmacotherapy note       Discharge plan:    1) To rehab: Lantus 10 units at bedtime along with Admelog correctional scales are currently ordered:  Admelog correctional scale before meals:   1 Unit(s) if Glucose 151 - 200  2 Unit(s) if Glucose 201 - 250  3 Unit(s) if Glucose 251 - 300  4 Unit(s) if Glucose 301 - 350  5 Unit(s) if Glucose 351 - 400  6 Unit(s) if Glucose Greater Than 400  and  Admelog correctional scale before bedtime  0 Unit(s) if Glucose 0 - 250  1 Unit(s) if Glucose 251 - 300  2 Unit(s) if Glucose 301 - 350  3 Unit(s) if Glucose 351 - 400  4 Unit(s) if Glucose Greater Than 400  -C-peptide 2.0 (pt producing endogenous insulin) serum glucose 127; pt could possibly be off insulin and switched to an oral regimen from rehab to home; tentative plan could be Farxiga 10mg p.o. Daily, Metformin (dose TBD based on GFR), And Ozempic 2mg sq weekly (dose may need to be adjusted depending on how long pt has been off of Ozempic); If must go home on insulin; would not start metformin and would defer restarting Farxiga and Ozempic (sister administers) once follow up with Endocrinologist if glucose remains stable at rehab.  As per Sister she is able to give Lantus once a day for pt.  -Rehab to home DM regimen should be determined based on glucose trend at Rehab.   -Please follow up with Dr. Te Saucedo [phone]; [address] [facility] [address] (as per Sister this is pts endocrinologist)   D/W sister Krystina Levine [phone]    2) Concerns about patient's ability to self manage insulin and glucose monitoring at home, even with CGM due to poor hand dexterity. Would need assistance applying CGM sensor but most importantly, she appears to need assistance with once daily basal insulin as assessment by clinical pharmacist today, She is requiring significantly less than home reported basal insulin dose while well controlled glucose which is concerning for improper insulin administration at home.      Would consider internal medicine or rheumatology consult to rule out arthritis due to appearance of her fingers.     3) Case management and social work should see this patient. She reports living alone, being uncomfortable to return to living alone and to have difficulty self managing diabetes. Would assess if there are any family members to assist with medication at home once patient leaves rehab. Defer to primary team to coordinate consult.     Please refer to our clinical pharmacist's documentation.    HTN  PLAN  - currently on amlodipine and losartan  - Can check urine microalbumin outpatient  - Outpatient goal BP <130/80  - management as per primary team     HLD  PLAN  - Continue atorvastatin 20mg daily per primary team/cardiology if no contraindications   - needs lipid panel outpt     D/ Surgery team [id number]    Christine Awad  Nurse Practitioner  Division of Endocrinology & Diabetes  In house pager #[phone]    If before 9AM or after 6PM, or on weekends/holidays, please call endocrine answering service for assistance ([phone]).For nonurgent matters email [email or web address] for assistance.          78F w/ pmh of hypertension, hyperlipidemia, diabetes on insulin, s/p  and AVINASH complaining of 2 days abdominal pain n/v with concern for SBO, with minimal po intake   endocrine called for DM   a1c 8.3    Poorly controlled T2DM with hyperglycemia  - HbA1c: 8  - Home Regimen:  Patient reports Lantus 60 units at bedtime however, if only requiring 10 units at this time. Our pharmacist confirmed that patient was also on Ozempic 2 mg weekly and Farxiga 10 mg daily.     Recent hypoglycemia on this admission    PLAN  - Consider internal medicine consult or rheumatology consult to rule out arthritis given hand/finger deformity.  This patient reports living alone with decreased dexterity to manage diabetes which is concerning for safety for rehabilitation. See clinical pharmacist assessment today: patient will need assistance with once daily basal insulin along with fingerstick or CGM monitoring in outpatient setting.     While inpatient and for rehab:  - Continue Lantus 10 units at bedtime. DO NOT HOLD IF NPO.  - Continue low dose Admelog correction scale pre-meal  - Continue low dose Admelog correction scale at bedtime  - Fingerstick BG before meals and bedtime  - Goal -180: stable today   - Carbohydrate consistent diet  - RD consult attempted but commented as terminated - see note   -Transitions of care pharmacist following: please refer to pharmacotherapy note       Discharge plan:    1) To rehab: Lantus 10 units at bedtime along with Admelog correctional scales are currently ordered:  Admelog correctional scale before meals:   1 Unit(s) if Glucose 151 - 200  2 Unit(s) if Glucose 201 - 250  3 Unit(s) if Glucose 251 - 300  4 Unit(s) if Glucose 301 - 350  5 Unit(s) if Glucose 351 - 400  6 Unit(s) if Glucose Greater Than 400  and  Admelog correctional scale before bedtime  0 Unit(s) if Glucose 0 - 250  1 Unit(s) if Glucose 251 - 300  2 Unit(s) if Glucose 301 - 350  3 Unit(s) if Glucose 351 - 400  4 Unit(s) if Glucose Greater Than 400  -C-peptide 2.0 (pt producing endogenous insulin) serum glucose 127; pt could possibly be off insulin and switched to an oral regimen from rehab to home; tentative plan could be Farxiga 10mg p.o. Daily, Metformin (dose TBD based on GFR), And Ozempic 2mg sq weekly (dose may need to be adjusted depending on how long pt has been off of Ozempic); If must go home on insulin; would not start metformin and would defer restarting Farxiga and Ozempic (sister administers) once follow up with Endocrinologist if glucose remains stable at rehab.  As per Sister she is able to give Lantus once a day for pt.  -Rehab to home DM regimen should be determined based on glucose trend at Rehab.   -Please follow up with Dr. Te Saucedo [phone]; [phone] [facility] [address] (as per Sister this is pts endocrinologist)   D/W sister Krystina Levine [phone]    2) Concerns about patient's ability to self manage insulin and glucose monitoring at home, even with CGM due to poor hand dexterity. Would need assistance applying CGM sensor but most importantly, she appears to need assistance with once daily basal insulin as assessment by clinical pharmacist today, She is requiring significantly less than home reported basal insulin dose while well controlled glucose which is concerning for improper insulin administration at home.      Would consider internal medicine or rheumatology consult to rule out arthritis due to appearance of her fingers.     3) Case management and social work should see this patient. She reports living alone, being uncomfortable to return to living alone and to have difficulty self managing diabetes. Would assess if there are any family members to assist with medication at home once patient leaves rehab. Defer to primary team to coordinate consult.     Please refer to our clinical pharmacist's documentation.    HTN  PLAN  - currently on amlodipine and losartan  - Can check urine microalbumin outpatient  - Outpatient goal BP <130/80  - management as per primary team     HLD  PLAN  - Continue atorvastatin 20mg daily per primary team/cardiology if no contraindications   - needs lipid panel outpt     D/ Surgery team [id number]    Christine Awad  Nurse Practitioner  Division of Endocrinology & Diabetes  In house pager #[phone]    If before 9AM or after 6PM, or on weekends/holidays, please call endocrine answering service for assistance ([phone]).For nonurgent matters email [email or web address] for assistance.

## 2023-12-05 NOTE — PROGRESS NOTE ADULT - SUBJECTIVE AND OBJECTIVE BOX
TEAM SURGERY PROGRESS NOTE    SUBJECTIVE: Patient seen and examined at bedside on AM rounds, patient without complaints. Tolerating reg diet. Denies n/v, abd pain. +flatus, +BM.    Vital Signs Last 24 Hrs  T(C): 36.8 (05 Dec 2023 05:58), Max: 37.1 (05 Dec 2023 02:06)  T(F): 98.2 (05 Dec 2023 05:58), Max: 98.7 (05 Dec 2023 02:06)  HR: 87 (05 Dec 2023 05:58) (69 - 87)  BP: 160/77 (05 Dec 2023 05:58) (113/55 - 160/77)  BP(mean): --  RR: 17 (05 Dec 2023 05:58) (16 - 18)  SpO2: 99% (05 Dec 2023 05:58) (98% - 100%)    Parameters below as of 05 Dec 2023 05:58  Patient On (Oxygen Delivery Method): room air      I&O's Detail    04 Dec 2023 07:01  -  05 Dec 2023 07:00  --------------------------------------------------------  IN:    IV PiggyBack: 50 mL    Oral Fluid: 1170 mL  Total IN: 1220 mL    OUT:    Voided (mL): 200 mL  Total OUT: 200 mL    Total NET: 1020 mL        MEDICATIONS  (STANDING):  albuterol    90 MICROgram(s) HFA Inhaler 2 Puff(s) Inhalation daily  amLODIPine   Tablet 5 milliGRAM(s) Oral daily  atorvastatin 20 milliGRAM(s) Oral at bedtime  dextrose 5%. 1000 milliLiter(s) (50 mL/Hr) IV Continuous <Continuous>  dextrose 50% Injectable 25 Gram(s) IV Push once  dextrose 50% Injectable 12.5 Gram(s) IV Push once  dextrose 50% Injectable 25 Gram(s) IV Push once  gabapentin 100 milliGRAM(s) Oral three times a day  heparin   Injectable 5000 Unit(s) SubCutaneous every 8 hours  insulin glargine Injectable (LANTUS) 10 Unit(s) SubCutaneous at bedtime  insulin lispro (ADMELOG) corrective regimen sliding scale   SubCutaneous three times a day before meals  insulin lispro (ADMELOG) corrective regimen sliding scale   SubCutaneous at bedtime  losartan 100 milliGRAM(s) Oral daily  montelukast 10 milliGRAM(s) Oral daily  pantoprazole    Tablet 40 milliGRAM(s) Oral before breakfast    MEDICATIONS  (PRN):  acetaminophen     Tablet .. 975 milliGRAM(s) Oral every 6 hours PRN Mild Pain (1 - 3)  dextrose Oral Gel 15 Gram(s) Oral once PRN Blood Glucose LESS THAN 70 milliGRAM(s)/deciliter      Physical Exam  General: A&Ox3, NAD  Respiratory: Equal bilateral expansion  Cardiovascular: Regular rate & rhythm  Abdominal: Soft. NTND.    LABS:                        8.7    5.29  )-----------( 337      ( 05 Dec 2023 06:25 )             27.4     12-05    137  |  104  |  26<H>  ----------------------------<  127<H>  4.9   |  25  |  0.94    Ca    8.8      05 Dec 2023 06:25  Phos  2.9     12-05  Mg     1.80     12-05        Urinalysis Basic - ( 05 Dec 2023 06:25 )    Color: x / Appearance: x / SG: x / pH: x  Gluc: 127 mg/dL / Ketone: x  / Bili: x / Urobili: x   Blood: x / Protein: x / Nitrite: x   Leuk Esterase: x / RBC: x / WBC x   Sq Epi: x / Non Sq Epi: x / Bacteria: x          Patient is a 78y Female

## 2023-12-05 NOTE — PROGRESS NOTE ADULT - NUTRITIONAL ASSESSMENT
This patient has been assessed with a concern for Malnutrition and has been determined to have a diagnosis/diagnoses of Moderate protein-calorie malnutrition.    This patient is being managed with:   Diet Regular-  Consistent Carbohydrate {Evening Snack} (CSTCHOSN)  Entered: Nov 29 2023  8:15AM  

## 2023-12-05 NOTE — PROGRESS NOTE ADULT - SUBJECTIVE AND OBJECTIVE BOX
Chief Complaint: Poorly controlled T2DM with hyperglycemia    History: Pt seen at bedside. Pt tolerating oral diet. Pt denies nausea and vomiting/any signs of hypoglycemia. Pt reports an adequate appetite.     MEDICATIONS  (STANDING):  albuterol    90 MICROgram(s) HFA Inhaler 2 Puff(s) Inhalation daily  amLODIPine   Tablet 5 milliGRAM(s) Oral daily  atorvastatin 20 milliGRAM(s) Oral at bedtime  dextrose 5%. 1000 milliLiter(s) (50 mL/Hr) IV Continuous <Continuous>  dextrose 50% Injectable 12.5 Gram(s) IV Push once  dextrose 50% Injectable 25 Gram(s) IV Push once  dextrose 50% Injectable 25 Gram(s) IV Push once  gabapentin 100 milliGRAM(s) Oral three times a day  heparin   Injectable 5000 Unit(s) SubCutaneous every 8 hours  insulin glargine Injectable (LANTUS) 10 Unit(s) SubCutaneous at bedtime  insulin lispro (ADMELOG) corrective regimen sliding scale   SubCutaneous three times a day before meals  insulin lispro (ADMELOG) corrective regimen sliding scale   SubCutaneous at bedtime  losartan 100 milliGRAM(s) Oral daily  montelukast 10 milliGRAM(s) Oral daily  pantoprazole    Tablet 40 milliGRAM(s) Oral before breakfast    MEDICATIONS  (PRN):  acetaminophen     Tablet .. 975 milliGRAM(s) Oral every 6 hours PRN Mild Pain (1 - 3)  dextrose Oral Gel 15 Gram(s) Oral once PRN Blood Glucose LESS THAN 70 milliGRAM(s)/deciliter      Allergies: penicillin (Rash)      Review of Systems:  Respiratory: No SOB, no cough  GI: No nausea, vomiting, abdominal pain  Endocrine: no polyuria, polydipsia      PHYSICAL EXAM:  VITALS: T(C): 36.7 (12-05-23 @ 09:59)  T(F): 98 (12-05-23 @ 09:59), Max: 98.7 (12-05-23 @ 02:06)  HR: 73 (12-05-23 @ 09:59) (70 - 87)  BP: 146/58 (12-05-23 @ 09:59) (133/59 - 160/77)  RR:  (16 - 18)  SpO2:  (98% - 100%)  Wt(kg): --  GENERAL: NAD, well-groomed, well-developed  RESPIRATORY: No labored breathing   GI: Soft, nontender, non distended  PSYCH: Alert and oriented x 3, normal affect, normal mood      CAPILLARY BLOOD GLUCOSE  POCT Blood Glucose.: 171 mg/dL (05 Dec 2023 12:03)  POCT Blood Glucose.: 123 mg/dL (05 Dec 2023 08:13)  POCT Blood Glucose.: 170 mg/dL (04 Dec 2023 22:11)  POCT Blood Glucose.: 156 mg/dL (04 Dec 2023 17:54)    A1C with Estimated Average Glucose (11.23.23 @ 16:12)    A1C with Estimated Average Glucose Result: 8.3   Estimated Average Glucose: 192      12-05    137  |  104  |  26<H>  ----------------------------<  127<H>  4.9   |  25  |  0.94    eGFR: 62    Ca    8.8      12-05  Mg     1.80     12-05  Phos  2.9     12-05      Diet, Regular:   Consistent Carbohydrate Evening Snack (CSTCHOSN) (11-29-23 @ 08:16) [Active]

## 2023-12-05 NOTE — PROGRESS NOTE ADULT - PROVIDER SPECIALTY LIST ADULT
Surgery
Endocrinology
Surgery
Endocrinology
Endocrinology

## 2023-12-05 NOTE — DISCHARGE NOTE NURSING/CASE MANAGEMENT/SOCIAL WORK - PATIENT PORTAL LINK FT
You can access the FollowMyHealth Patient Portal offered by [facility] by registering at the following website: [email or web address]. By joining [employer]’s FollowMyHealth portal, you will also be able to view your health information using other applications (apps) compatible with our system. You can access the FollowMyHealth Patient Portal offered by [facility] by registering at the following website: [email or web address]. By joining [employer]’s FollowMyHealth portal, you will also be able to view your health information using other applications (apps) compatible with our system.

## 2023-12-05 NOTE — PROGRESS NOTE ADULT - ASSESSMENT
78F w/ pmh of hypertension, hyperlipidemia, diabetes on insulin, s/p  and AVINASH s/p resolved adhesive SBO with non-operative management. Patient with resumed bowel function and no further nausea, diet has been slowly advance with appropriate tolerance. Patient today will get insulin teaching. Afterwards patient ready for discharge to rehab.    Plan:  - Regular diet  - appreciate endocrine recs    - Monitor abd function.   - dulcolax suppository x1  - PO meds  - Appreciate GYN recs  - DVT ppx  - Dispo: Rehab pending auth    B team surgery [id number] 78F w/ pmh of hypertension, hyperlipidemia, diabetes on insulin, s/p  and AVINASH s/p resolved adhesive SBO with non-operative management. Patient with resumed bowel function and no further nausea, diet has been slowly advance with appropriate tolerance. Patient today will get insulin teaching. Afterwards patient ready for discharge to rehab.    Plan:  - Regular diet  - appreciate endocrine recs    - Monitor abd function.   - dulcolax suppository x1  - PO meds  - Appreciate GYN recs  - DVT ppx  - Dispo: Rehab pending auth    B team surgery [id number]

## 2023-12-05 NOTE — PROGRESS NOTE ADULT - ATTENDING COMMENTS
PT admitted with an SBO which has since resolved  Regular diet  Home meds  D/c planning      Iron Montelongo MD  Acute and Critical Care Surgery    The Acute Care Surgery (B Team) Attending Group Practice:  Dr. Damari Pedersen, Dr. William Sheriff, Dr. Iron Montelongo,  Dr. Glenn Thomas and Dr. Linette Knox     Urgent issues - spectra [phone] or [phone]  Nonurgent issues - [phone]  Patient appointments or after hours - [phone] PT admitted with an SBO which has since resolved  Regular diet  Home meds  D/c planning      Iron Montelongo MD  Acute and Critical Care Surgery    The Acute Care Surgery (B Team) Attending Group Practice:  Dr. Damari Pedersen, Dr. William Sheriff, Dr. Iron Montelongo,  Dr. Glenn Thomas and Dr. Linette Knox     Urgent issues - spectra [phone] or [phone]  Nonurgent issues - [phone]  Patient appointments or after hours - [phone]

## 2023-12-05 NOTE — DISCHARGE NOTE NURSING/CASE MANAGEMENT/SOCIAL WORK - NSDCPEFALRISK_GEN_ALL_CORE
For information on Fall & Injury Prevention, visit: [email or web address] OR  [email or web address] OR  https://www.cdc.gov/steadi/patient.html For information on Fall & Injury Prevention, visit: [email or web address] OR  [email or web address] OR  https://www.cdc.gov/steadi/patient.html

## 2024-07-03 ENCOUNTER — APPOINTMENT (OUTPATIENT)
Dept: UROGYNECOLOGY | Facility: CLINIC | Age: 79
End: 2024-07-03
Payer: MEDICARE

## 2024-07-03 DIAGNOSIS — N81.11 CYSTOCELE, MIDLINE: ICD-10-CM

## 2024-07-03 DIAGNOSIS — N99.3 PROLAPSE OF VAGINAL VAULT AFTER HYSTERECTOMY: ICD-10-CM

## 2024-07-03 DIAGNOSIS — N81.6 RECTOCELE: ICD-10-CM

## 2024-07-03 PROCEDURE — A4562: CPT

## 2024-07-03 PROCEDURE — G2211 COMPLEX E/M VISIT ADD ON: CPT

## 2024-07-03 PROCEDURE — 99213 OFFICE O/P EST LOW 20 MIN: CPT

## 2024-07-08 PROBLEM — I10 ESSENTIAL (PRIMARY) HYPERTENSION: Chronic | Status: ACTIVE | Noted: 2023-11-24

## 2024-07-08 PROBLEM — E11.9 TYPE 2 DIABETES MELLITUS WITHOUT COMPLICATIONS: Chronic | Status: ACTIVE | Noted: 2023-11-24

## 2024-08-07 ENCOUNTER — APPOINTMENT (OUTPATIENT)
Dept: UROGYNECOLOGY | Facility: CLINIC | Age: 79
End: 2024-08-07

## 2024-08-07 PROCEDURE — G2211 COMPLEX E/M VISIT ADD ON: CPT

## 2024-08-07 PROCEDURE — 99213 OFFICE O/P EST LOW 20 MIN: CPT

## 2024-08-08 NOTE — DISCUSSION/SUMMARY
[FreeTextEntry1] : #POP - Cube #7 left out.  Discussed with pt and family that the spotting was likely due to irritation from pessary as evidenced by scant bleeding and old blood with pessary removal and speculum exam, as well as area of erythema at posterior and bilateral vaginal vault.  No fistula palpated or noted on examination.  Pt has previously failed large GH and Cube #6.  Recommend to try larger Donut at next visit after pelvic rest.   - Discussed with patient and family that she will continue to notice spotting on her pad as old blood is draining today and tomorrow which should be improving.  If the spotting worsens or continues after two days, pt knows to contact our office.  - Discussed methods to empty bladder completely such as reducing prolapse with her fingers, double voiding, and leaning forward with voiding.  Pt does not have issues emptying her bladder and will call our office if she feels that she is not emptying her bladder completely.   RTO in 2-3 weeks or sooner as needed. All questions answered to pt satisfaction.  Pt can call the office with any additional questions or concerns; pt verbalized understanding.

## 2024-08-08 NOTE — HISTORY OF PRESENT ILLNESS
[FreeTextEntry1] : Krystina is a 77yo with hx of POP who was fitted with Cube #7 at her last visit (07/03/24).  She had previously tried GH LS #3.25 and #3.5 that fell out, as well as Cube #6 that fell out.  Today she presents for follow-up.  Pt denies any uterovaginal pain but does endorse intermittent spotting episodes on her pad once or twice a day.  She is voiding and moving her bowels without issues.

## 2024-08-08 NOTE — REASON FOR VISIT
[Family Member] : family member [Ad Hoc ] : provided by an ad hoc  [Interpreters_Relationshiptopatient] : Sister

## 2024-08-08 NOTE — PHYSICAL EXAM
[No Acute Distress] : in no acute distress [Well developed] : well developed [Well Nourished] : ~L well nourished [Good Hygeine] : demonstrates good hygeine [Oriented x3] : oriented to person, place, and time [Normal Memory] : ~T memory was ~L unimpaired [Normal Mood/Affect] : mood and affect are normal [Warm and Dry] : was warm and dry to touch [Labia Majora] : were normal [Labia Minora] : were normal [Normal] : was normal [Atrophy] : atrophy [Erythematous] : erythema [Discharge] : a  ~M vaginal discharge was present [Yadiel] : yellow [Thin] : thin [Scant] : there was scant vaginal bleeding [Old Blood] : old blood was present in the vagina [Anxiety] : patient is not anxious [de-identified] : Slow but steady with walker  [FreeTextEntry4] : Scant bleeding with pessary removal and speculum exam likely due to irritation from pessary as evidenced by area of erythema at posterior and bilateral vaginal vault; stopped with pressure.

## 2024-08-08 NOTE — PROCEDURE
[Refit] : refit needed [Erythema] : erythema noted [Discharge] : there is vaginal discharge [Pessary Out] : removed [Mild] : mild bleeding [Resolved w/pressure] : was resolved by applying pressure [Good Fit] : fit is not good [Erosion] : no evidence of erosion [Infection] : no evidence of infection [FreeTextEntry1] : Cube #7 [de-identified] : Scant bleeding with pessary removal and speculum exam likely due to irritation from pessary as evidenced by area of erythema at posterior and bilateral vaginal vault; stopped with pressure.  [FreeTextEntry8] : Routine perineal care reviewed

## 2024-10-09 ENCOUNTER — APPOINTMENT (OUTPATIENT)
Dept: UROGYNECOLOGY | Facility: CLINIC | Age: 79
End: 2024-10-09
Payer: MEDICARE

## 2024-10-09 DIAGNOSIS — N81.6 RECTOCELE: ICD-10-CM

## 2024-10-09 DIAGNOSIS — N81.11 CYSTOCELE, MIDLINE: ICD-10-CM

## 2024-10-09 PROCEDURE — 99213 OFFICE O/P EST LOW 20 MIN: CPT

## 2025-08-13 ENCOUNTER — INPATIENT (INPATIENT)
Facility: HOSPITAL | Age: 80
LOS: 6 days | Discharge: NURSING, REHAB OR LONG-TERM CARE | End: 2025-08-20
Attending: INTERNAL MEDICINE | Admitting: INTERNAL MEDICINE
Payer: MEDICARE

## 2025-08-13 VITALS
SYSTOLIC BLOOD PRESSURE: 158 MMHG | RESPIRATION RATE: 15 BRPM | HEART RATE: 75 BPM | DIASTOLIC BLOOD PRESSURE: 65 MMHG | TEMPERATURE: 98 F | OXYGEN SATURATION: 98 %

## 2025-08-13 DIAGNOSIS — Z90.710 ACQUIRED ABSENCE OF BOTH CERVIX AND UTERUS: Chronic | ICD-10-CM

## 2025-08-13 DIAGNOSIS — Z98.891 HISTORY OF UTERINE SCAR FROM PREVIOUS SURGERY: Chronic | ICD-10-CM

## 2025-08-13 DIAGNOSIS — R29.6 REPEATED FALLS: ICD-10-CM

## 2025-08-13 DIAGNOSIS — Z79.899 OTHER LONG TERM (CURRENT) DRUG THERAPY: ICD-10-CM

## 2025-08-13 DIAGNOSIS — I10 ESSENTIAL (PRIMARY) HYPERTENSION: ICD-10-CM

## 2025-08-13 DIAGNOSIS — E11.9 TYPE 2 DIABETES MELLITUS WITHOUT COMPLICATIONS: ICD-10-CM
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
## 2025-08-13 LAB
ADD ON TEST-SPECIMEN IN LAB: SIGNIFICANT CHANGE UP
ALBUMIN SERPL ELPH-MCNC: 3.4 G/DL — SIGNIFICANT CHANGE UP (ref 3.3–5)
ALP SERPL-CCNC: 109 U/L — SIGNIFICANT CHANGE UP (ref 40–120)
ALT FLD-CCNC: 19 U/L — SIGNIFICANT CHANGE UP (ref 4–33)
ANION GAP SERPL CALC-SCNC: 13 MMOL/L — SIGNIFICANT CHANGE UP (ref 7–14)
AST SERPL-CCNC: 29 U/L — SIGNIFICANT CHANGE UP (ref 4–32)
BASOPHILS # BLD AUTO: 0.01 K/UL — SIGNIFICANT CHANGE UP (ref 0–0.2)
BASOPHILS NFR BLD AUTO: 0.2 % — SIGNIFICANT CHANGE UP (ref 0–2)
BILIRUB SERPL-MCNC: 0.4 MG/DL — SIGNIFICANT CHANGE UP (ref 0.2–1.2)
BUN SERPL-MCNC: 66 MG/DL — HIGH (ref 7–23)
CALCIUM SERPL-MCNC: 9.1 MG/DL — SIGNIFICANT CHANGE UP (ref 8.4–10.5)
CHLORIDE SERPL-SCNC: 105 MMOL/L — SIGNIFICANT CHANGE UP (ref 98–107)
CK MB BLD-MCNC: 2.8 % — HIGH (ref 0–2.5)
CK MB CFR SERPL CALC: 2.3 NG/ML — SIGNIFICANT CHANGE UP
CK SERPL-CCNC: 82 U/L — SIGNIFICANT CHANGE UP (ref 25–170)
CO2 SERPL-SCNC: 16 MMOL/L — LOW (ref 22–31)
CREAT SERPL-MCNC: 1.13 MG/DL — SIGNIFICANT CHANGE UP (ref 0.5–1.3)
EGFR: 50 ML/MIN/1.73M2 — LOW
EGFR: 50 ML/MIN/1.73M2 — LOW
EOSINOPHIL # BLD AUTO: 0.23 K/UL — SIGNIFICANT CHANGE UP (ref 0–0.5)
EOSINOPHIL NFR BLD AUTO: 4.6 % — SIGNIFICANT CHANGE UP (ref 0–6)
GLUCOSE BLDC GLUCOMTR-MCNC: 160 MG/DL — HIGH (ref 70–99)
GLUCOSE SERPL-MCNC: 158 MG/DL — HIGH (ref 70–99)
HCT VFR BLD CALC: 36.7 % — SIGNIFICANT CHANGE UP (ref 34.5–45)
HGB BLD-MCNC: 11.9 G/DL — SIGNIFICANT CHANGE UP (ref 11.5–15.5)
IMM GRANULOCYTES # BLD AUTO: 0.01 K/UL — SIGNIFICANT CHANGE UP (ref 0–0.07)
IMM GRANULOCYTES NFR BLD AUTO: 0.2 % — SIGNIFICANT CHANGE UP (ref 0–0.9)
LYMPHOCYTES # BLD AUTO: 1.26 K/UL — SIGNIFICANT CHANGE UP (ref 1–3.3)
LYMPHOCYTES NFR BLD AUTO: 25.1 % — SIGNIFICANT CHANGE UP (ref 13–44)
MCHC RBC-ENTMCNC: 31.2 PG — SIGNIFICANT CHANGE UP (ref 27–34)
MCHC RBC-ENTMCNC: 32.4 G/DL — SIGNIFICANT CHANGE UP (ref 32–36)
MCV RBC AUTO: 96.3 FL — SIGNIFICANT CHANGE UP (ref 80–100)
MONOCYTES # BLD AUTO: 0.38 K/UL — SIGNIFICANT CHANGE UP (ref 0–0.9)
MONOCYTES NFR BLD AUTO: 7.6 % — SIGNIFICANT CHANGE UP (ref 2–14)
NEUTROPHILS # BLD AUTO: 3.13 K/UL — SIGNIFICANT CHANGE UP (ref 1.8–7.4)
NEUTROPHILS NFR BLD AUTO: 62.3 % — SIGNIFICANT CHANGE UP (ref 43–77)
NRBC # BLD AUTO: 0 K/UL — SIGNIFICANT CHANGE UP (ref 0–0)
NRBC # FLD: 0 K/UL — SIGNIFICANT CHANGE UP (ref 0–0)
NRBC BLD AUTO-RTO: 0 /100 WBCS — SIGNIFICANT CHANGE UP (ref 0–0)
PLATELET # BLD AUTO: 193 K/UL — SIGNIFICANT CHANGE UP (ref 150–400)
PMV BLD: 10.2 FL — SIGNIFICANT CHANGE UP (ref 7–13)
POTASSIUM SERPL-MCNC: 5.3 MMOL/L — SIGNIFICANT CHANGE UP (ref 3.5–5.3)
POTASSIUM SERPL-SCNC: 5.3 MMOL/L — SIGNIFICANT CHANGE UP (ref 3.5–5.3)
PROT SERPL-MCNC: 8.2 G/DL — SIGNIFICANT CHANGE UP (ref 6–8.3)
RBC # BLD: 3.81 M/UL — SIGNIFICANT CHANGE UP (ref 3.8–5.2)
RBC # FLD: 13 % — SIGNIFICANT CHANGE UP (ref 10.3–14.5)
SODIUM SERPL-SCNC: 134 MMOL/L — LOW (ref 135–145)
TROPONIN T, HIGH SENSITIVITY RESULT: 51 NG/L — HIGH
WBC # BLD: 5.02 K/UL — SIGNIFICANT CHANGE UP (ref 3.8–10.5)
WBC # FLD AUTO: 5.02 K/UL — SIGNIFICANT CHANGE UP (ref 3.8–10.5)

## 2025-08-13 PROCEDURE — 99223 1ST HOSP IP/OBS HIGH 75: CPT

## 2025-08-13 PROCEDURE — 73080 X-RAY EXAM OF ELBOW: CPT | Mod: 26,LT

## 2025-08-13 PROCEDURE — 70450 CT HEAD/BRAIN W/O DYE: CPT | Mod: 26

## 2025-08-13 PROCEDURE — 71046 X-RAY EXAM CHEST 2 VIEWS: CPT | Mod: 26

## 2025-08-13 PROCEDURE — 72170 X-RAY EXAM OF PELVIS: CPT | Mod: 26,XE

## 2025-08-13 PROCEDURE — 72125 CT NECK SPINE W/O DYE: CPT | Mod: 26

## 2025-08-13 PROCEDURE — 73522 X-RAY EXAM HIPS BI 3-4 VIEWS: CPT | Mod: 26

## 2025-08-13 PROCEDURE — 73030 X-RAY EXAM OF SHOULDER: CPT | Mod: 26,LT

## 2025-08-13 PROCEDURE — 99285 EMERGENCY DEPT VISIT HI MDM: CPT | Mod: FS

## 2025-08-13 RX ORDER — DEXTROSE 50 % IN WATER 50 %
25 SYRINGE (ML) INTRAVENOUS ONCE
Refills: 0 | Status: DISCONTINUED | OUTPATIENT
Start: 2025-08-13 | End: 2025-08-20

## 2025-08-13 RX ORDER — GABAPENTIN 400 MG/1
200 CAPSULE ORAL
Refills: 0 | Status: DISCONTINUED | OUTPATIENT
Start: 2025-08-13 | End: 2025-08-20

## 2025-08-13 RX ORDER — INSULIN LISPRO 100 U/ML
INJECTION, SOLUTION INTRAVENOUS; SUBCUTANEOUS AT BEDTIME
Refills: 0 | Status: DISCONTINUED | OUTPATIENT
Start: 2025-08-13 | End: 2025-08-20

## 2025-08-13 RX ORDER — ACETAMINOPHEN 500 MG/5ML
650 LIQUID (ML) ORAL ONCE
Refills: 0 | Status: COMPLETED | OUTPATIENT
Start: 2025-08-13 | End: 2025-08-13

## 2025-08-13 RX ORDER — MELATONIN 5 MG
3 TABLET ORAL AT BEDTIME
Refills: 0 | Status: DISCONTINUED | OUTPATIENT
Start: 2025-08-13 | End: 2025-08-20

## 2025-08-13 RX ORDER — GLUCAGON 3 MG/1
1 POWDER NASAL ONCE
Refills: 0 | Status: DISCONTINUED | OUTPATIENT
Start: 2025-08-13 | End: 2025-08-20

## 2025-08-13 RX ORDER — ATORVASTATIN CALCIUM 80 MG/1
40 TABLET, FILM COATED ORAL AT BEDTIME
Refills: 0 | Status: DISCONTINUED | OUTPATIENT
Start: 2025-08-13 | End: 2025-08-20

## 2025-08-13 RX ORDER — AMLODIPINE BESYLATE 10 MG/1
5 TABLET ORAL DAILY
Refills: 0 | Status: DISCONTINUED | OUTPATIENT
Start: 2025-08-13 | End: 2025-08-18

## 2025-08-13 RX ORDER — MAGNESIUM, ALUMINUM HYDROXIDE 200-200 MG
30 TABLET,CHEWABLE ORAL EVERY 4 HOURS
Refills: 0 | Status: DISCONTINUED | OUTPATIENT
Start: 2025-08-13 | End: 2025-08-20

## 2025-08-13 RX ORDER — DEXTROSE 50 % IN WATER 50 %
15 SYRINGE (ML) INTRAVENOUS ONCE
Refills: 0 | Status: DISCONTINUED | OUTPATIENT
Start: 2025-08-13 | End: 2025-08-20

## 2025-08-13 RX ORDER — SERTRALINE 100 MG/1
25 TABLET, FILM COATED ORAL DAILY
Refills: 0 | Status: DISCONTINUED | OUTPATIENT
Start: 2025-08-13 | End: 2025-08-20

## 2025-08-13 RX ORDER — INSULIN LISPRO 100 U/ML
INJECTION, SOLUTION INTRAVENOUS; SUBCUTANEOUS
Refills: 0 | Status: DISCONTINUED | OUTPATIENT
Start: 2025-08-13 | End: 2025-08-20

## 2025-08-13 RX ORDER — ONDANSETRON HCL/PF 4 MG/2 ML
4 VIAL (ML) INJECTION EVERY 8 HOURS
Refills: 0 | Status: DISCONTINUED | OUTPATIENT
Start: 2025-08-13 | End: 2025-08-20

## 2025-08-13 RX ORDER — DEXTROSE 50 % IN WATER 50 %
12.5 SYRINGE (ML) INTRAVENOUS ONCE
Refills: 0 | Status: DISCONTINUED | OUTPATIENT
Start: 2025-08-13 | End: 2025-08-20

## 2025-08-13 RX ORDER — ACETAMINOPHEN 500 MG/5ML
650 LIQUID (ML) ORAL EVERY 6 HOURS
Refills: 0 | Status: DISCONTINUED | OUTPATIENT
Start: 2025-08-13 | End: 2025-08-20

## 2025-08-13 RX ORDER — INSULIN GLARGINE-YFGN 100 [IU]/ML
10 INJECTION, SOLUTION SUBCUTANEOUS AT BEDTIME
Refills: 0 | Status: DISCONTINUED | OUTPATIENT
Start: 2025-08-13 | End: 2025-08-20

## 2025-08-13 RX ORDER — DONEPEZIL HYDROCHLORIDE 5 MG/1
10 TABLET ORAL AT BEDTIME
Refills: 0 | Status: DISCONTINUED | OUTPATIENT
Start: 2025-08-13 | End: 2025-08-20

## 2025-08-13 RX ORDER — HEPARIN SODIUM 1000 [USP'U]/ML
5000 INJECTION INTRAVENOUS; SUBCUTANEOUS EVERY 12 HOURS
Refills: 0 | Status: DISCONTINUED | OUTPATIENT
Start: 2025-08-13 | End: 2025-08-20

## 2025-08-13 RX ORDER — SODIUM CHLORIDE 9 G/1000ML
1000 INJECTION, SOLUTION INTRAVENOUS
Refills: 0 | Status: DISCONTINUED | OUTPATIENT
Start: 2025-08-13 | End: 2025-08-20

## 2025-08-13 RX ORDER — ASPIRIN 325 MG
81 TABLET ORAL DAILY
Refills: 0 | Status: DISCONTINUED | OUTPATIENT
Start: 2025-08-13 | End: 2025-08-20

## 2025-08-13 RX ADMIN — DONEPEZIL HYDROCHLORIDE 10 MILLIGRAM(S): 5 TABLET ORAL at 22:45

## 2025-08-13 RX ADMIN — Medication 50 MILLIGRAM(S): at 22:45

## 2025-08-13 RX ADMIN — ATORVASTATIN CALCIUM 40 MILLIGRAM(S): 80 TABLET, FILM COATED ORAL at 22:45

## 2025-08-13 RX ADMIN — INSULIN LISPRO 1: 100 INJECTION, SOLUTION INTRAVENOUS; SUBCUTANEOUS at 22:39

## 2025-08-13 RX ADMIN — Medication 650 MILLIGRAM(S): at 14:38

## 2025-08-13 RX ADMIN — Medication 650 MILLIGRAM(S): at 15:08

## 2025-08-14 ENCOUNTER — RESULT REVIEW (OUTPATIENT)
Age: 80
End: 2025-08-14

## 2025-08-14 LAB
A1C WITH ESTIMATED AVERAGE GLUCOSE RESULT: 5.4 % — SIGNIFICANT CHANGE UP (ref 4–5.6)
ANION GAP SERPL CALC-SCNC: 13 MMOL/L — SIGNIFICANT CHANGE UP (ref 7–14)
BUN SERPL-MCNC: 59 MG/DL — HIGH (ref 7–23)
CALCIUM SERPL-MCNC: 9.6 MG/DL — SIGNIFICANT CHANGE UP (ref 8.4–10.5)
CHLORIDE SERPL-SCNC: 104 MMOL/L — SIGNIFICANT CHANGE UP (ref 98–107)
CO2 SERPL-SCNC: 20 MMOL/L — LOW (ref 22–31)
CREAT SERPL-MCNC: 1.06 MG/DL — SIGNIFICANT CHANGE UP (ref 0.5–1.3)
EGFR: 54 ML/MIN/1.73M2 — LOW
EGFR: 54 ML/MIN/1.73M2 — LOW
ESTIMATED AVERAGE GLUCOSE: 108 — SIGNIFICANT CHANGE UP
GLUCOSE BLDC GLUCOMTR-MCNC: 123 MG/DL — HIGH (ref 70–99)
GLUCOSE BLDC GLUCOMTR-MCNC: 127 MG/DL — HIGH (ref 70–99)
GLUCOSE BLDC GLUCOMTR-MCNC: 201 MG/DL — HIGH (ref 70–99)
GLUCOSE BLDC GLUCOMTR-MCNC: 221 MG/DL — HIGH (ref 70–99)
GLUCOSE BLDC GLUCOMTR-MCNC: 274 MG/DL — HIGH (ref 70–99)
GLUCOSE BLDC GLUCOMTR-MCNC: 92 MG/DL — SIGNIFICANT CHANGE UP (ref 70–99)
GLUCOSE SERPL-MCNC: 94 MG/DL — SIGNIFICANT CHANGE UP (ref 70–99)
HCT VFR BLD CALC: 38.8 % — SIGNIFICANT CHANGE UP (ref 34.5–45)
HGB BLD-MCNC: 12.2 G/DL — SIGNIFICANT CHANGE UP (ref 11.5–15.5)
MAGNESIUM SERPL-MCNC: 2.3 MG/DL — SIGNIFICANT CHANGE UP (ref 1.6–2.6)
MCHC RBC-ENTMCNC: 30.6 PG — SIGNIFICANT CHANGE UP (ref 27–34)
MCHC RBC-ENTMCNC: 31.4 G/DL — LOW (ref 32–36)
MCV RBC AUTO: 97.2 FL — SIGNIFICANT CHANGE UP (ref 80–100)
MRSA PCR RESULT.: SIGNIFICANT CHANGE UP
NRBC # BLD AUTO: 0 K/UL — SIGNIFICANT CHANGE UP (ref 0–0)
NRBC # FLD: 0 K/UL — SIGNIFICANT CHANGE UP (ref 0–0)
NRBC BLD AUTO-RTO: 0 /100 WBCS — SIGNIFICANT CHANGE UP (ref 0–0)
PHOSPHATE SERPL-MCNC: 3.6 MG/DL — SIGNIFICANT CHANGE UP (ref 2.5–4.5)
PLATELET # BLD AUTO: 220 K/UL — SIGNIFICANT CHANGE UP (ref 150–400)
PMV BLD: 10.7 FL — SIGNIFICANT CHANGE UP (ref 7–13)
POTASSIUM SERPL-MCNC: 5 MMOL/L — SIGNIFICANT CHANGE UP (ref 3.5–5.3)
POTASSIUM SERPL-SCNC: 5 MMOL/L — SIGNIFICANT CHANGE UP (ref 3.5–5.3)
RBC # BLD: 3.99 M/UL — SIGNIFICANT CHANGE UP (ref 3.8–5.2)
RBC # FLD: 13 % — SIGNIFICANT CHANGE UP (ref 10.3–14.5)
S AUREUS DNA NOSE QL NAA+PROBE: DETECTED
SODIUM SERPL-SCNC: 137 MMOL/L — SIGNIFICANT CHANGE UP (ref 135–145)
WBC # BLD: 5 K/UL — SIGNIFICANT CHANGE UP (ref 3.8–10.5)
WBC # FLD AUTO: 5 K/UL — SIGNIFICANT CHANGE UP (ref 3.8–10.5)

## 2025-08-14 PROCEDURE — 93016 CV STRESS TEST SUPVJ ONLY: CPT | Mod: GC

## 2025-08-14 PROCEDURE — 93018 CV STRESS TEST I&R ONLY: CPT | Mod: GC

## 2025-08-14 PROCEDURE — 78452 HT MUSCLE IMAGE SPECT MULT: CPT | Mod: 26

## 2025-08-14 PROCEDURE — 99232 SBSQ HOSP IP/OBS MODERATE 35: CPT | Mod: 25

## 2025-08-14 RX ORDER — MONTELUKAST SODIUM 10 MG/1
10 TABLET ORAL AT BEDTIME
Refills: 0 | Status: DISCONTINUED | OUTPATIENT
Start: 2025-08-14 | End: 2025-08-20

## 2025-08-14 RX ORDER — MUPIROCIN CALCIUM 20 MG/G
1 CREAM TOPICAL
Refills: 0 | Status: COMPLETED | OUTPATIENT
Start: 2025-08-14 | End: 2025-08-19

## 2025-08-14 RX ORDER — ALBUTEROL SULFATE 2.5 MG/3ML
2 VIAL, NEBULIZER (ML) INHALATION EVERY 6 HOURS
Refills: 0 | Status: DISCONTINUED | OUTPATIENT
Start: 2025-08-14 | End: 2025-08-20

## 2025-08-14 RX ORDER — CARVEDILOL 3.12 MG/1
6.25 TABLET, FILM COATED ORAL EVERY 12 HOURS
Refills: 0 | Status: DISCONTINUED | OUTPATIENT
Start: 2025-08-14 | End: 2025-08-17

## 2025-08-14 RX ADMIN — AMLODIPINE BESYLATE 5 MILLIGRAM(S): 10 TABLET ORAL at 04:59

## 2025-08-14 RX ADMIN — GABAPENTIN 200 MILLIGRAM(S): 400 CAPSULE ORAL at 04:57

## 2025-08-14 RX ADMIN — INSULIN GLARGINE-YFGN 10 UNIT(S): 100 INJECTION, SOLUTION SUBCUTANEOUS at 22:31

## 2025-08-14 RX ADMIN — GABAPENTIN 200 MILLIGRAM(S): 400 CAPSULE ORAL at 17:59

## 2025-08-14 RX ADMIN — SERTRALINE 25 MILLIGRAM(S): 100 TABLET, FILM COATED ORAL at 13:04

## 2025-08-14 RX ADMIN — DONEPEZIL HYDROCHLORIDE 10 MILLIGRAM(S): 5 TABLET ORAL at 22:24

## 2025-08-14 RX ADMIN — Medication 81 MILLIGRAM(S): at 13:04

## 2025-08-14 RX ADMIN — INSULIN LISPRO 1: 100 INJECTION, SOLUTION INTRAVENOUS; SUBCUTANEOUS at 22:30

## 2025-08-14 RX ADMIN — Medication 50 MILLIGRAM(S): at 22:24

## 2025-08-14 RX ADMIN — INSULIN LISPRO 2: 100 INJECTION, SOLUTION INTRAVENOUS; SUBCUTANEOUS at 13:04

## 2025-08-14 RX ADMIN — MONTELUKAST SODIUM 10 MILLIGRAM(S): 10 TABLET ORAL at 22:24

## 2025-08-14 RX ADMIN — HEPARIN SODIUM 5000 UNIT(S): 1000 INJECTION INTRAVENOUS; SUBCUTANEOUS at 17:59

## 2025-08-14 RX ADMIN — Medication 50 MILLIGRAM(S): at 13:00

## 2025-08-14 RX ADMIN — ATORVASTATIN CALCIUM 40 MILLIGRAM(S): 80 TABLET, FILM COATED ORAL at 22:24

## 2025-08-14 RX ADMIN — HEPARIN SODIUM 5000 UNIT(S): 1000 INJECTION INTRAVENOUS; SUBCUTANEOUS at 04:57

## 2025-08-14 RX ADMIN — Medication 50 MILLIGRAM(S): at 04:59

## 2025-08-14 RX ADMIN — CARVEDILOL 6.25 MILLIGRAM(S): 3.12 TABLET, FILM COATED ORAL at 18:00

## 2025-08-15 LAB
ANION GAP SERPL CALC-SCNC: 14 MMOL/L — SIGNIFICANT CHANGE UP (ref 7–14)
BUN SERPL-MCNC: 50 MG/DL — HIGH (ref 7–23)
CALCIUM SERPL-MCNC: 9.5 MG/DL — SIGNIFICANT CHANGE UP (ref 8.4–10.5)
CHLORIDE SERPL-SCNC: 105 MMOL/L — SIGNIFICANT CHANGE UP (ref 98–107)
CO2 SERPL-SCNC: 19 MMOL/L — LOW (ref 22–31)
CREAT SERPL-MCNC: 1.05 MG/DL — SIGNIFICANT CHANGE UP (ref 0.5–1.3)
EGFR: 54 ML/MIN/1.73M2 — LOW
EGFR: 54 ML/MIN/1.73M2 — LOW
GLUCOSE BLDC GLUCOMTR-MCNC: 116 MG/DL — HIGH (ref 70–99)
GLUCOSE BLDC GLUCOMTR-MCNC: 171 MG/DL — HIGH (ref 70–99)
GLUCOSE BLDC GLUCOMTR-MCNC: 172 MG/DL — HIGH (ref 70–99)
GLUCOSE SERPL-MCNC: 94 MG/DL — SIGNIFICANT CHANGE UP (ref 70–99)
HCT VFR BLD CALC: 34.7 % — SIGNIFICANT CHANGE UP (ref 34.5–45)
HGB BLD-MCNC: 10.9 G/DL — LOW (ref 11.5–15.5)
MAGNESIUM SERPL-MCNC: 2.3 MG/DL — SIGNIFICANT CHANGE UP (ref 1.6–2.6)
MCHC RBC-ENTMCNC: 30.5 PG — SIGNIFICANT CHANGE UP (ref 27–34)
MCHC RBC-ENTMCNC: 31.4 G/DL — LOW (ref 32–36)
MCV RBC AUTO: 97.2 FL — SIGNIFICANT CHANGE UP (ref 80–100)
NRBC # BLD AUTO: 0 K/UL — SIGNIFICANT CHANGE UP (ref 0–0)
NRBC # FLD: 0 K/UL — SIGNIFICANT CHANGE UP (ref 0–0)
NRBC BLD AUTO-RTO: 0 /100 WBCS — SIGNIFICANT CHANGE UP (ref 0–0)
PHOSPHATE SERPL-MCNC: 3.1 MG/DL — SIGNIFICANT CHANGE UP (ref 2.5–4.5)
PLATELET # BLD AUTO: 206 K/UL — SIGNIFICANT CHANGE UP (ref 150–400)
PMV BLD: 10.8 FL — SIGNIFICANT CHANGE UP (ref 7–13)
POTASSIUM SERPL-MCNC: 4.8 MMOL/L — SIGNIFICANT CHANGE UP (ref 3.5–5.3)
POTASSIUM SERPL-SCNC: 4.8 MMOL/L — SIGNIFICANT CHANGE UP (ref 3.5–5.3)
RBC # BLD: 3.57 M/UL — LOW (ref 3.8–5.2)
RBC # FLD: 12.9 % — SIGNIFICANT CHANGE UP (ref 10.3–14.5)
SODIUM SERPL-SCNC: 138 MMOL/L — SIGNIFICANT CHANGE UP (ref 135–145)
WBC # BLD: 6.25 K/UL — SIGNIFICANT CHANGE UP (ref 3.8–10.5)
WBC # FLD AUTO: 6.25 K/UL — SIGNIFICANT CHANGE UP (ref 3.8–10.5)

## 2025-08-15 PROCEDURE — 99232 SBSQ HOSP IP/OBS MODERATE 35: CPT

## 2025-08-15 RX ADMIN — GABAPENTIN 200 MILLIGRAM(S): 400 CAPSULE ORAL at 17:24

## 2025-08-15 RX ADMIN — INSULIN GLARGINE-YFGN 10 UNIT(S): 100 INJECTION, SOLUTION SUBCUTANEOUS at 21:39

## 2025-08-15 RX ADMIN — DONEPEZIL HYDROCHLORIDE 10 MILLIGRAM(S): 5 TABLET ORAL at 21:03

## 2025-08-15 RX ADMIN — GABAPENTIN 200 MILLIGRAM(S): 400 CAPSULE ORAL at 05:08

## 2025-08-15 RX ADMIN — Medication 3 MILLIGRAM(S): at 21:04

## 2025-08-15 RX ADMIN — Medication 2 PUFF(S): at 21:17

## 2025-08-15 RX ADMIN — HEPARIN SODIUM 5000 UNIT(S): 1000 INJECTION INTRAVENOUS; SUBCUTANEOUS at 05:09

## 2025-08-15 RX ADMIN — Medication 50 MILLIGRAM(S): at 21:03

## 2025-08-15 RX ADMIN — Medication 81 MILLIGRAM(S): at 12:34

## 2025-08-15 RX ADMIN — MUPIROCIN CALCIUM 1 APPLICATION(S): 20 CREAM TOPICAL at 17:25

## 2025-08-15 RX ADMIN — MONTELUKAST SODIUM 10 MILLIGRAM(S): 10 TABLET ORAL at 21:04

## 2025-08-15 RX ADMIN — MUPIROCIN CALCIUM 1 APPLICATION(S): 20 CREAM TOPICAL at 01:15

## 2025-08-15 RX ADMIN — ATORVASTATIN CALCIUM 40 MILLIGRAM(S): 80 TABLET, FILM COATED ORAL at 21:03

## 2025-08-15 RX ADMIN — CARVEDILOL 6.25 MILLIGRAM(S): 3.12 TABLET, FILM COATED ORAL at 17:24

## 2025-08-15 RX ADMIN — INSULIN LISPRO 1: 100 INJECTION, SOLUTION INTRAVENOUS; SUBCUTANEOUS at 12:33

## 2025-08-15 RX ADMIN — Medication 50 MILLIGRAM(S): at 12:33

## 2025-08-15 RX ADMIN — Medication 1 APPLICATION(S): at 05:09

## 2025-08-15 RX ADMIN — CARVEDILOL 6.25 MILLIGRAM(S): 3.12 TABLET, FILM COATED ORAL at 05:09

## 2025-08-15 RX ADMIN — Medication 50 MILLIGRAM(S): at 05:09

## 2025-08-15 RX ADMIN — MUPIROCIN CALCIUM 1 APPLICATION(S): 20 CREAM TOPICAL at 05:09

## 2025-08-15 RX ADMIN — HEPARIN SODIUM 5000 UNIT(S): 1000 INJECTION INTRAVENOUS; SUBCUTANEOUS at 17:24

## 2025-08-15 RX ADMIN — AMLODIPINE BESYLATE 5 MILLIGRAM(S): 10 TABLET ORAL at 05:08

## 2025-08-15 RX ADMIN — Medication 75 MILLILITER(S): at 15:36

## 2025-08-15 RX ADMIN — SERTRALINE 25 MILLIGRAM(S): 100 TABLET, FILM COATED ORAL at 12:34

## 2025-08-16 LAB
ANION GAP SERPL CALC-SCNC: 9 MMOL/L — SIGNIFICANT CHANGE UP (ref 7–14)
BASOPHILS # BLD AUTO: 0.02 K/UL — SIGNIFICANT CHANGE UP (ref 0–0.2)
BASOPHILS NFR BLD AUTO: 0.5 % — SIGNIFICANT CHANGE UP (ref 0–2)
BUN SERPL-MCNC: 49 MG/DL — HIGH (ref 7–23)
CALCIUM SERPL-MCNC: 8.9 MG/DL — SIGNIFICANT CHANGE UP (ref 8.4–10.5)
CHLORIDE SERPL-SCNC: 107 MMOL/L — SIGNIFICANT CHANGE UP (ref 98–107)
CO2 SERPL-SCNC: 19 MMOL/L — LOW (ref 22–31)
CREAT SERPL-MCNC: 1.12 MG/DL — SIGNIFICANT CHANGE UP (ref 0.5–1.3)
EGFR: 50 ML/MIN/1.73M2 — LOW
EGFR: 50 ML/MIN/1.73M2 — LOW
EOSINOPHIL # BLD AUTO: 0.28 K/UL — SIGNIFICANT CHANGE UP (ref 0–0.5)
EOSINOPHIL NFR BLD AUTO: 6.6 % — HIGH (ref 0–6)
GLUCOSE BLDC GLUCOMTR-MCNC: 111 MG/DL — HIGH (ref 70–99)
GLUCOSE BLDC GLUCOMTR-MCNC: 124 MG/DL — HIGH (ref 70–99)
GLUCOSE BLDC GLUCOMTR-MCNC: 129 MG/DL — HIGH (ref 70–99)
GLUCOSE BLDC GLUCOMTR-MCNC: 194 MG/DL — HIGH (ref 70–99)
GLUCOSE SERPL-MCNC: 128 MG/DL — HIGH (ref 70–99)
HCT VFR BLD CALC: 30.4 % — LOW (ref 34.5–45)
HGB BLD-MCNC: 9.7 G/DL — LOW (ref 11.5–15.5)
IMM GRANULOCYTES # BLD AUTO: 0.01 K/UL — SIGNIFICANT CHANGE UP (ref 0–0.07)
IMM GRANULOCYTES NFR BLD AUTO: 0.2 % — SIGNIFICANT CHANGE UP (ref 0–0.9)
LYMPHOCYTES # BLD AUTO: 1.51 K/UL — SIGNIFICANT CHANGE UP (ref 1–3.3)
LYMPHOCYTES NFR BLD AUTO: 35.4 % — SIGNIFICANT CHANGE UP (ref 13–44)
MAGNESIUM SERPL-MCNC: 2.1 MG/DL — SIGNIFICANT CHANGE UP (ref 1.6–2.6)
MCHC RBC-ENTMCNC: 30.6 PG — SIGNIFICANT CHANGE UP (ref 27–34)
MCHC RBC-ENTMCNC: 31.9 G/DL — LOW (ref 32–36)
MCV RBC AUTO: 95.9 FL — SIGNIFICANT CHANGE UP (ref 80–100)
MONOCYTES # BLD AUTO: 0.52 K/UL — SIGNIFICANT CHANGE UP (ref 0–0.9)
MONOCYTES NFR BLD AUTO: 12.2 % — SIGNIFICANT CHANGE UP (ref 2–14)
NEUTROPHILS # BLD AUTO: 1.93 K/UL — SIGNIFICANT CHANGE UP (ref 1.8–7.4)
NEUTROPHILS NFR BLD AUTO: 45.1 % — SIGNIFICANT CHANGE UP (ref 43–77)
NRBC # BLD AUTO: 0 K/UL — SIGNIFICANT CHANGE UP (ref 0–0)
NRBC # FLD: 0 K/UL — SIGNIFICANT CHANGE UP (ref 0–0)
NRBC BLD AUTO-RTO: 0 /100 WBCS — SIGNIFICANT CHANGE UP (ref 0–0)
PHOSPHATE SERPL-MCNC: 3.2 MG/DL — SIGNIFICANT CHANGE UP (ref 2.5–4.5)
PLATELET # BLD AUTO: 191 K/UL — SIGNIFICANT CHANGE UP (ref 150–400)
PMV BLD: 10.6 FL — SIGNIFICANT CHANGE UP (ref 7–13)
POTASSIUM SERPL-MCNC: 4.8 MMOL/L — SIGNIFICANT CHANGE UP (ref 3.5–5.3)
POTASSIUM SERPL-SCNC: 4.8 MMOL/L — SIGNIFICANT CHANGE UP (ref 3.5–5.3)
RBC # BLD: 3.17 M/UL — LOW (ref 3.8–5.2)
RBC # FLD: 12.9 % — SIGNIFICANT CHANGE UP (ref 10.3–14.5)
SODIUM SERPL-SCNC: 135 MMOL/L — SIGNIFICANT CHANGE UP (ref 135–145)
WBC # BLD: 4.27 K/UL — SIGNIFICANT CHANGE UP (ref 3.8–10.5)
WBC # FLD AUTO: 4.27 K/UL — SIGNIFICANT CHANGE UP (ref 3.8–10.5)

## 2025-08-16 PROCEDURE — 99232 SBSQ HOSP IP/OBS MODERATE 35: CPT

## 2025-08-16 RX ADMIN — INSULIN GLARGINE-YFGN 10 UNIT(S): 100 INJECTION, SOLUTION SUBCUTANEOUS at 21:34

## 2025-08-16 RX ADMIN — GABAPENTIN 200 MILLIGRAM(S): 400 CAPSULE ORAL at 05:08

## 2025-08-16 RX ADMIN — Medication 2 PUFF(S): at 10:00

## 2025-08-16 RX ADMIN — Medication 75 MILLILITER(S): at 17:34

## 2025-08-16 RX ADMIN — SERTRALINE 25 MILLIGRAM(S): 100 TABLET, FILM COATED ORAL at 13:15

## 2025-08-16 RX ADMIN — HEPARIN SODIUM 5000 UNIT(S): 1000 INJECTION INTRAVENOUS; SUBCUTANEOUS at 05:09

## 2025-08-16 RX ADMIN — ATORVASTATIN CALCIUM 40 MILLIGRAM(S): 80 TABLET, FILM COATED ORAL at 21:34

## 2025-08-16 RX ADMIN — AMLODIPINE BESYLATE 5 MILLIGRAM(S): 10 TABLET ORAL at 17:26

## 2025-08-16 RX ADMIN — Medication 81 MILLIGRAM(S): at 13:16

## 2025-08-16 RX ADMIN — MUPIROCIN CALCIUM 1 APPLICATION(S): 20 CREAM TOPICAL at 17:14

## 2025-08-16 RX ADMIN — INSULIN LISPRO 1: 100 INJECTION, SOLUTION INTRAVENOUS; SUBCUTANEOUS at 17:31

## 2025-08-16 RX ADMIN — Medication 50 MILLIGRAM(S): at 23:31

## 2025-08-16 RX ADMIN — MUPIROCIN CALCIUM 1 APPLICATION(S): 20 CREAM TOPICAL at 05:09

## 2025-08-16 RX ADMIN — Medication 75 MILLILITER(S): at 05:08

## 2025-08-16 RX ADMIN — HEPARIN SODIUM 5000 UNIT(S): 1000 INJECTION INTRAVENOUS; SUBCUTANEOUS at 17:12

## 2025-08-16 RX ADMIN — Medication 1 APPLICATION(S): at 05:09

## 2025-08-16 RX ADMIN — MONTELUKAST SODIUM 10 MILLIGRAM(S): 10 TABLET ORAL at 21:34

## 2025-08-16 RX ADMIN — Medication 50 MILLIGRAM(S): at 13:16

## 2025-08-16 RX ADMIN — CARVEDILOL 6.25 MILLIGRAM(S): 3.12 TABLET, FILM COATED ORAL at 17:13

## 2025-08-16 RX ADMIN — DONEPEZIL HYDROCHLORIDE 10 MILLIGRAM(S): 5 TABLET ORAL at 21:34

## 2025-08-16 RX ADMIN — GABAPENTIN 200 MILLIGRAM(S): 400 CAPSULE ORAL at 17:13

## 2025-08-17 LAB
ANION GAP SERPL CALC-SCNC: 10 MMOL/L — SIGNIFICANT CHANGE UP (ref 7–14)
BASOPHILS # BLD AUTO: 0.02 K/UL — SIGNIFICANT CHANGE UP (ref 0–0.2)
BASOPHILS NFR BLD AUTO: 0.5 % — SIGNIFICANT CHANGE UP (ref 0–2)
BUN SERPL-MCNC: 41 MG/DL — HIGH (ref 7–23)
CALCIUM SERPL-MCNC: 9.1 MG/DL — SIGNIFICANT CHANGE UP (ref 8.4–10.5)
CHLORIDE SERPL-SCNC: 110 MMOL/L — HIGH (ref 98–107)
CO2 SERPL-SCNC: 19 MMOL/L — LOW (ref 22–31)
CREAT SERPL-MCNC: 0.92 MG/DL — SIGNIFICANT CHANGE UP (ref 0.5–1.3)
EGFR: 64 ML/MIN/1.73M2 — SIGNIFICANT CHANGE UP
EGFR: 64 ML/MIN/1.73M2 — SIGNIFICANT CHANGE UP
EOSINOPHIL # BLD AUTO: 0.29 K/UL — SIGNIFICANT CHANGE UP (ref 0–0.5)
EOSINOPHIL NFR BLD AUTO: 6.7 % — HIGH (ref 0–6)
GLUCOSE BLDC GLUCOMTR-MCNC: 158 MG/DL — HIGH (ref 70–99)
GLUCOSE BLDC GLUCOMTR-MCNC: 184 MG/DL — HIGH (ref 70–99)
GLUCOSE BLDC GLUCOMTR-MCNC: 94 MG/DL — SIGNIFICANT CHANGE UP (ref 70–99)
GLUCOSE BLDC GLUCOMTR-MCNC: 97 MG/DL — SIGNIFICANT CHANGE UP (ref 70–99)
GLUCOSE SERPL-MCNC: 86 MG/DL — SIGNIFICANT CHANGE UP (ref 70–99)
HCT VFR BLD CALC: 34 % — LOW (ref 34.5–45)
HGB BLD-MCNC: 10.6 G/DL — LOW (ref 11.5–15.5)
IMM GRANULOCYTES # BLD AUTO: 0.01 K/UL — SIGNIFICANT CHANGE UP (ref 0–0.07)
IMM GRANULOCYTES NFR BLD AUTO: 0.2 % — SIGNIFICANT CHANGE UP (ref 0–0.9)
LYMPHOCYTES # BLD AUTO: 1.56 K/UL — SIGNIFICANT CHANGE UP (ref 1–3.3)
LYMPHOCYTES NFR BLD AUTO: 35.9 % — SIGNIFICANT CHANGE UP (ref 13–44)
MAGNESIUM SERPL-MCNC: 2.1 MG/DL — SIGNIFICANT CHANGE UP (ref 1.6–2.6)
MCHC RBC-ENTMCNC: 30.3 PG — SIGNIFICANT CHANGE UP (ref 27–34)
MCHC RBC-ENTMCNC: 31.2 G/DL — LOW (ref 32–36)
MCV RBC AUTO: 97.1 FL — SIGNIFICANT CHANGE UP (ref 80–100)
MONOCYTES # BLD AUTO: 0.42 K/UL — SIGNIFICANT CHANGE UP (ref 0–0.9)
MONOCYTES NFR BLD AUTO: 9.7 % — SIGNIFICANT CHANGE UP (ref 2–14)
NEUTROPHILS # BLD AUTO: 2.04 K/UL — SIGNIFICANT CHANGE UP (ref 1.8–7.4)
NEUTROPHILS NFR BLD AUTO: 47 % — SIGNIFICANT CHANGE UP (ref 43–77)
NRBC # BLD AUTO: 0 K/UL — SIGNIFICANT CHANGE UP (ref 0–0)
NRBC # FLD: 0 K/UL — SIGNIFICANT CHANGE UP (ref 0–0)
NRBC BLD AUTO-RTO: 0 /100 WBCS — SIGNIFICANT CHANGE UP (ref 0–0)
PHOSPHATE SERPL-MCNC: 3 MG/DL — SIGNIFICANT CHANGE UP (ref 2.5–4.5)
PLATELET # BLD AUTO: 197 K/UL — SIGNIFICANT CHANGE UP (ref 150–400)
PMV BLD: 10.6 FL — SIGNIFICANT CHANGE UP (ref 7–13)
POTASSIUM SERPL-MCNC: 4.7 MMOL/L — SIGNIFICANT CHANGE UP (ref 3.5–5.3)
POTASSIUM SERPL-SCNC: 4.7 MMOL/L — SIGNIFICANT CHANGE UP (ref 3.5–5.3)
RBC # BLD: 3.5 M/UL — LOW (ref 3.8–5.2)
RBC # FLD: 12.8 % — SIGNIFICANT CHANGE UP (ref 10.3–14.5)
SODIUM SERPL-SCNC: 139 MMOL/L — SIGNIFICANT CHANGE UP (ref 135–145)
WBC # BLD: 4.34 K/UL — SIGNIFICANT CHANGE UP (ref 3.8–10.5)
WBC # FLD AUTO: 4.34 K/UL — SIGNIFICANT CHANGE UP (ref 3.8–10.5)

## 2025-08-17 PROCEDURE — 99232 SBSQ HOSP IP/OBS MODERATE 35: CPT

## 2025-08-17 RX ORDER — CARVEDILOL 3.12 MG/1
6.25 TABLET, FILM COATED ORAL EVERY 12 HOURS
Refills: 0 | Status: DISCONTINUED | OUTPATIENT
Start: 2025-08-17 | End: 2025-08-18

## 2025-08-17 RX ADMIN — GABAPENTIN 200 MILLIGRAM(S): 400 CAPSULE ORAL at 06:15

## 2025-08-17 RX ADMIN — Medication 1 APPLICATION(S): at 06:22

## 2025-08-17 RX ADMIN — MUPIROCIN CALCIUM 1 APPLICATION(S): 20 CREAM TOPICAL at 17:09

## 2025-08-17 RX ADMIN — Medication 81 MILLIGRAM(S): at 13:12

## 2025-08-17 RX ADMIN — DONEPEZIL HYDROCHLORIDE 10 MILLIGRAM(S): 5 TABLET ORAL at 21:41

## 2025-08-17 RX ADMIN — HEPARIN SODIUM 5000 UNIT(S): 1000 INJECTION INTRAVENOUS; SUBCUTANEOUS at 17:05

## 2025-08-17 RX ADMIN — Medication 50 MILLIGRAM(S): at 07:48

## 2025-08-17 RX ADMIN — MONTELUKAST SODIUM 10 MILLIGRAM(S): 10 TABLET ORAL at 21:41

## 2025-08-17 RX ADMIN — AMLODIPINE BESYLATE 5 MILLIGRAM(S): 10 TABLET ORAL at 07:48

## 2025-08-17 RX ADMIN — MUPIROCIN CALCIUM 1 APPLICATION(S): 20 CREAM TOPICAL at 06:17

## 2025-08-17 RX ADMIN — Medication 50 MILLIGRAM(S): at 13:12

## 2025-08-17 RX ADMIN — ATORVASTATIN CALCIUM 40 MILLIGRAM(S): 80 TABLET, FILM COATED ORAL at 21:40

## 2025-08-17 RX ADMIN — GABAPENTIN 200 MILLIGRAM(S): 400 CAPSULE ORAL at 17:06

## 2025-08-17 RX ADMIN — INSULIN GLARGINE-YFGN 10 UNIT(S): 100 INJECTION, SOLUTION SUBCUTANEOUS at 21:42

## 2025-08-17 RX ADMIN — HEPARIN SODIUM 5000 UNIT(S): 1000 INJECTION INTRAVENOUS; SUBCUTANEOUS at 06:16

## 2025-08-17 RX ADMIN — INSULIN LISPRO 1: 100 INJECTION, SOLUTION INTRAVENOUS; SUBCUTANEOUS at 17:41

## 2025-08-17 RX ADMIN — SERTRALINE 25 MILLIGRAM(S): 100 TABLET, FILM COATED ORAL at 13:12

## 2025-08-17 RX ADMIN — Medication 75 MILLILITER(S): at 17:04

## 2025-08-17 RX ADMIN — CARVEDILOL 6.25 MILLIGRAM(S): 3.12 TABLET, FILM COATED ORAL at 06:17

## 2025-08-18 ENCOUNTER — TRANSCRIPTION ENCOUNTER (OUTPATIENT)
Age: 80
End: 2025-08-18

## 2025-08-18 LAB
ANION GAP SERPL CALC-SCNC: 10 MMOL/L — SIGNIFICANT CHANGE UP (ref 7–14)
BASOPHILS # BLD AUTO: 0.02 K/UL — SIGNIFICANT CHANGE UP (ref 0–0.2)
BASOPHILS NFR BLD AUTO: 0.4 % — SIGNIFICANT CHANGE UP (ref 0–2)
BUN SERPL-MCNC: 42 MG/DL — HIGH (ref 7–23)
CALCIUM SERPL-MCNC: 8.5 MG/DL — SIGNIFICANT CHANGE UP (ref 8.4–10.5)
CHLORIDE SERPL-SCNC: 109 MMOL/L — HIGH (ref 98–107)
CO2 SERPL-SCNC: 18 MMOL/L — LOW (ref 22–31)
CREAT SERPL-MCNC: 1.13 MG/DL — SIGNIFICANT CHANGE UP (ref 0.5–1.3)
EGFR: 50 ML/MIN/1.73M2 — LOW
EGFR: 50 ML/MIN/1.73M2 — LOW
EOSINOPHIL # BLD AUTO: 0.31 K/UL — SIGNIFICANT CHANGE UP (ref 0–0.5)
EOSINOPHIL NFR BLD AUTO: 6.8 % — HIGH (ref 0–6)
GLUCOSE BLDC GLUCOMTR-MCNC: 131 MG/DL — HIGH (ref 70–99)
GLUCOSE BLDC GLUCOMTR-MCNC: 184 MG/DL — HIGH (ref 70–99)
GLUCOSE BLDC GLUCOMTR-MCNC: 82 MG/DL — SIGNIFICANT CHANGE UP (ref 70–99)
GLUCOSE BLDC GLUCOMTR-MCNC: 90 MG/DL — SIGNIFICANT CHANGE UP (ref 70–99)
GLUCOSE SERPL-MCNC: 108 MG/DL — HIGH (ref 70–99)
HCT VFR BLD CALC: 27.4 % — LOW (ref 34.5–45)
HGB BLD-MCNC: 8.5 G/DL — LOW (ref 11.5–15.5)
IMM GRANULOCYTES # BLD AUTO: 0.01 K/UL — SIGNIFICANT CHANGE UP (ref 0–0.07)
IMM GRANULOCYTES NFR BLD AUTO: 0.2 % — SIGNIFICANT CHANGE UP (ref 0–0.9)
LYMPHOCYTES # BLD AUTO: 1.67 K/UL — SIGNIFICANT CHANGE UP (ref 1–3.3)
LYMPHOCYTES NFR BLD AUTO: 36.9 % — SIGNIFICANT CHANGE UP (ref 13–44)
MAGNESIUM SERPL-MCNC: 2 MG/DL — SIGNIFICANT CHANGE UP (ref 1.6–2.6)
MCHC RBC-ENTMCNC: 30.1 PG — SIGNIFICANT CHANGE UP (ref 27–34)
MCHC RBC-ENTMCNC: 31 G/DL — LOW (ref 32–36)
MCV RBC AUTO: 97.2 FL — SIGNIFICANT CHANGE UP (ref 80–100)
MONOCYTES # BLD AUTO: 0.41 K/UL — SIGNIFICANT CHANGE UP (ref 0–0.9)
MONOCYTES NFR BLD AUTO: 9.1 % — SIGNIFICANT CHANGE UP (ref 2–14)
NEUTROPHILS # BLD AUTO: 2.11 K/UL — SIGNIFICANT CHANGE UP (ref 1.8–7.4)
NEUTROPHILS NFR BLD AUTO: 46.6 % — SIGNIFICANT CHANGE UP (ref 43–77)
NRBC # BLD AUTO: 0 K/UL — SIGNIFICANT CHANGE UP (ref 0–0)
NRBC # FLD: 0 K/UL — SIGNIFICANT CHANGE UP (ref 0–0)
NRBC BLD AUTO-RTO: 0 /100 WBCS — SIGNIFICANT CHANGE UP (ref 0–0)
PHOSPHATE SERPL-MCNC: 3.4 MG/DL — SIGNIFICANT CHANGE UP (ref 2.5–4.5)
PLATELET # BLD AUTO: 176 K/UL — SIGNIFICANT CHANGE UP (ref 150–400)
PMV BLD: 10.2 FL — SIGNIFICANT CHANGE UP (ref 7–13)
POTASSIUM SERPL-MCNC: 4.7 MMOL/L — SIGNIFICANT CHANGE UP (ref 3.5–5.3)
POTASSIUM SERPL-SCNC: 4.7 MMOL/L — SIGNIFICANT CHANGE UP (ref 3.5–5.3)
RBC # BLD: 2.82 M/UL — LOW (ref 3.8–5.2)
RBC # FLD: 12.9 % — SIGNIFICANT CHANGE UP (ref 10.3–14.5)
SODIUM SERPL-SCNC: 137 MMOL/L — SIGNIFICANT CHANGE UP (ref 135–145)
WBC # BLD: 4.53 K/UL — SIGNIFICANT CHANGE UP (ref 3.8–10.5)
WBC # FLD AUTO: 4.53 K/UL — SIGNIFICANT CHANGE UP (ref 3.8–10.5)

## 2025-08-18 PROCEDURE — 99232 SBSQ HOSP IP/OBS MODERATE 35: CPT

## 2025-08-18 RX ORDER — LOSARTAN POTASSIUM 100 MG/1
100 TABLET, FILM COATED ORAL DAILY
Refills: 0 | Status: DISCONTINUED | OUTPATIENT
Start: 2025-08-18 | End: 2025-08-20

## 2025-08-18 RX ORDER — CARVEDILOL 3.12 MG/1
6.25 TABLET, FILM COATED ORAL EVERY 12 HOURS
Refills: 0 | Status: DISCONTINUED | OUTPATIENT
Start: 2025-08-18 | End: 2025-08-19

## 2025-08-18 RX ORDER — AMLODIPINE BESYLATE 10 MG/1
5 TABLET ORAL DAILY
Refills: 0 | Status: DISCONTINUED | OUTPATIENT
Start: 2025-08-18 | End: 2025-08-20

## 2025-08-18 RX ORDER — LOSARTAN POTASSIUM 100 MG/1
50 TABLET, FILM COATED ORAL DAILY
Refills: 0 | Status: DISCONTINUED | OUTPATIENT
Start: 2025-08-18 | End: 2025-08-18

## 2025-08-18 RX ADMIN — INSULIN GLARGINE-YFGN 10 UNIT(S): 100 INJECTION, SOLUTION SUBCUTANEOUS at 23:03

## 2025-08-18 RX ADMIN — HEPARIN SODIUM 5000 UNIT(S): 1000 INJECTION INTRAVENOUS; SUBCUTANEOUS at 18:43

## 2025-08-18 RX ADMIN — HEPARIN SODIUM 5000 UNIT(S): 1000 INJECTION INTRAVENOUS; SUBCUTANEOUS at 06:01

## 2025-08-18 RX ADMIN — ATORVASTATIN CALCIUM 40 MILLIGRAM(S): 80 TABLET, FILM COATED ORAL at 23:03

## 2025-08-18 RX ADMIN — Medication 50 MILLIGRAM(S): at 23:03

## 2025-08-18 RX ADMIN — MUPIROCIN CALCIUM 1 APPLICATION(S): 20 CREAM TOPICAL at 17:42

## 2025-08-18 RX ADMIN — GABAPENTIN 200 MILLIGRAM(S): 400 CAPSULE ORAL at 06:00

## 2025-08-18 RX ADMIN — SERTRALINE 25 MILLIGRAM(S): 100 TABLET, FILM COATED ORAL at 08:49

## 2025-08-18 RX ADMIN — GABAPENTIN 200 MILLIGRAM(S): 400 CAPSULE ORAL at 18:00

## 2025-08-18 RX ADMIN — DONEPEZIL HYDROCHLORIDE 10 MILLIGRAM(S): 5 TABLET ORAL at 23:03

## 2025-08-18 RX ADMIN — LOSARTAN POTASSIUM 100 MILLIGRAM(S): 100 TABLET, FILM COATED ORAL at 18:03

## 2025-08-18 RX ADMIN — MONTELUKAST SODIUM 10 MILLIGRAM(S): 10 TABLET ORAL at 23:03

## 2025-08-18 RX ADMIN — MUPIROCIN CALCIUM 1 APPLICATION(S): 20 CREAM TOPICAL at 06:02

## 2025-08-18 RX ADMIN — Medication 1 APPLICATION(S): at 06:03

## 2025-08-18 RX ADMIN — Medication 81 MILLIGRAM(S): at 08:48

## 2025-08-19 LAB
ANION GAP SERPL CALC-SCNC: 13 MMOL/L — SIGNIFICANT CHANGE UP (ref 7–14)
BUN SERPL-MCNC: 29 MG/DL — HIGH (ref 7–23)
CALCIUM SERPL-MCNC: 9 MG/DL — SIGNIFICANT CHANGE UP (ref 8.4–10.5)
CHLORIDE SERPL-SCNC: 108 MMOL/L — HIGH (ref 98–107)
CO2 SERPL-SCNC: 15 MMOL/L — LOW (ref 22–31)
CREAT SERPL-MCNC: 0.88 MG/DL — SIGNIFICANT CHANGE UP (ref 0.5–1.3)
EGFR: 67 ML/MIN/1.73M2 — SIGNIFICANT CHANGE UP
EGFR: 67 ML/MIN/1.73M2 — SIGNIFICANT CHANGE UP
FLUAV AG NPH QL: SIGNIFICANT CHANGE UP
FLUBV AG NPH QL: SIGNIFICANT CHANGE UP
GLUCOSE BLDC GLUCOMTR-MCNC: 110 MG/DL — HIGH (ref 70–99)
GLUCOSE BLDC GLUCOMTR-MCNC: 118 MG/DL — HIGH (ref 70–99)
GLUCOSE BLDC GLUCOMTR-MCNC: 81 MG/DL — SIGNIFICANT CHANGE UP (ref 70–99)
GLUCOSE BLDC GLUCOMTR-MCNC: 87 MG/DL — SIGNIFICANT CHANGE UP (ref 70–99)
GLUCOSE SERPL-MCNC: 74 MG/DL — SIGNIFICANT CHANGE UP (ref 70–99)
HCT VFR BLD CALC: 31.1 % — LOW (ref 34.5–45)
HGB BLD-MCNC: 9.7 G/DL — LOW (ref 11.5–15.5)
MAGNESIUM SERPL-MCNC: 1.9 MG/DL — SIGNIFICANT CHANGE UP (ref 1.6–2.6)
MCHC RBC-ENTMCNC: 30.4 PG — SIGNIFICANT CHANGE UP (ref 27–34)
MCHC RBC-ENTMCNC: 31.2 G/DL — LOW (ref 32–36)
MCV RBC AUTO: 97.5 FL — SIGNIFICANT CHANGE UP (ref 80–100)
NRBC # BLD AUTO: 0 K/UL — SIGNIFICANT CHANGE UP (ref 0–0)
NRBC # FLD: 0 K/UL — SIGNIFICANT CHANGE UP (ref 0–0)
NRBC BLD AUTO-RTO: 0 /100 WBCS — SIGNIFICANT CHANGE UP (ref 0–0)
PHOSPHATE SERPL-MCNC: 2.8 MG/DL — SIGNIFICANT CHANGE UP (ref 2.5–4.5)
PLATELET # BLD AUTO: 196 K/UL — SIGNIFICANT CHANGE UP (ref 150–400)
PMV BLD: 10.2 FL — SIGNIFICANT CHANGE UP (ref 7–13)
POTASSIUM SERPL-MCNC: 5 MMOL/L — SIGNIFICANT CHANGE UP (ref 3.5–5.3)
POTASSIUM SERPL-SCNC: 5 MMOL/L — SIGNIFICANT CHANGE UP (ref 3.5–5.3)
RBC # BLD: 3.19 M/UL — LOW (ref 3.8–5.2)
RBC # FLD: 12.8 % — SIGNIFICANT CHANGE UP (ref 10.3–14.5)
RSV RNA NPH QL NAA+NON-PROBE: SIGNIFICANT CHANGE UP
SARS-COV-2 RNA SPEC QL NAA+PROBE: SIGNIFICANT CHANGE UP
SODIUM SERPL-SCNC: 136 MMOL/L — SIGNIFICANT CHANGE UP (ref 135–145)
SOURCE RESPIRATORY: SIGNIFICANT CHANGE UP
WBC # BLD: 3.99 K/UL — SIGNIFICANT CHANGE UP (ref 3.8–10.5)
WBC # FLD AUTO: 3.99 K/UL — SIGNIFICANT CHANGE UP (ref 3.8–10.5)

## 2025-08-19 PROCEDURE — 99232 SBSQ HOSP IP/OBS MODERATE 35: CPT

## 2025-08-19 PROCEDURE — 93010 ELECTROCARDIOGRAM REPORT: CPT

## 2025-08-19 PROCEDURE — 71045 X-RAY EXAM CHEST 1 VIEW: CPT | Mod: 26

## 2025-08-19 RX ORDER — FUROSEMIDE 10 MG/ML
1 INJECTION INTRAMUSCULAR; INTRAVENOUS
Refills: 0 | DISCHARGE

## 2025-08-19 RX ORDER — AMLODIPINE BESYLATE 10 MG/1
1 TABLET ORAL
Qty: 0 | Refills: 0 | DISCHARGE
Start: 2025-08-19

## 2025-08-19 RX ADMIN — Medication 50 MILLIGRAM(S): at 22:21

## 2025-08-19 RX ADMIN — SERTRALINE 25 MILLIGRAM(S): 100 TABLET, FILM COATED ORAL at 08:41

## 2025-08-19 RX ADMIN — GABAPENTIN 200 MILLIGRAM(S): 400 CAPSULE ORAL at 05:26

## 2025-08-19 RX ADMIN — MONTELUKAST SODIUM 10 MILLIGRAM(S): 10 TABLET ORAL at 22:21

## 2025-08-19 RX ADMIN — CARVEDILOL 6.25 MILLIGRAM(S): 3.12 TABLET, FILM COATED ORAL at 08:42

## 2025-08-19 RX ADMIN — LOSARTAN POTASSIUM 100 MILLIGRAM(S): 100 TABLET, FILM COATED ORAL at 05:29

## 2025-08-19 RX ADMIN — DONEPEZIL HYDROCHLORIDE 10 MILLIGRAM(S): 5 TABLET ORAL at 22:30

## 2025-08-19 RX ADMIN — INSULIN GLARGINE-YFGN 10 UNIT(S): 100 INJECTION, SOLUTION SUBCUTANEOUS at 22:22

## 2025-08-19 RX ADMIN — ATORVASTATIN CALCIUM 40 MILLIGRAM(S): 80 TABLET, FILM COATED ORAL at 22:22

## 2025-08-19 RX ADMIN — Medication 81 MILLIGRAM(S): at 08:41

## 2025-08-19 RX ADMIN — GABAPENTIN 200 MILLIGRAM(S): 400 CAPSULE ORAL at 17:00

## 2025-08-19 RX ADMIN — MUPIROCIN CALCIUM 1 APPLICATION(S): 20 CREAM TOPICAL at 16:33

## 2025-08-19 RX ADMIN — HEPARIN SODIUM 5000 UNIT(S): 1000 INJECTION INTRAVENOUS; SUBCUTANEOUS at 05:27

## 2025-08-19 RX ADMIN — MUPIROCIN CALCIUM 1 APPLICATION(S): 20 CREAM TOPICAL at 05:26

## 2025-08-19 RX ADMIN — AMLODIPINE BESYLATE 5 MILLIGRAM(S): 10 TABLET ORAL at 08:42

## 2025-08-19 RX ADMIN — HEPARIN SODIUM 5000 UNIT(S): 1000 INJECTION INTRAVENOUS; SUBCUTANEOUS at 17:00

## 2025-08-19 RX ADMIN — Medication 1 APPLICATION(S): at 05:45

## 2025-08-20 ENCOUNTER — TRANSCRIPTION ENCOUNTER (OUTPATIENT)
Age: 80
End: 2025-08-20

## 2025-08-20 VITALS
RESPIRATION RATE: 16 BRPM | OXYGEN SATURATION: 100 % | TEMPERATURE: 98 F | HEART RATE: 70 BPM | DIASTOLIC BLOOD PRESSURE: 70 MMHG | SYSTOLIC BLOOD PRESSURE: 168 MMHG

## 2025-08-20 LAB
ANION GAP SERPL CALC-SCNC: 11 MMOL/L — SIGNIFICANT CHANGE UP (ref 7–14)
BUN SERPL-MCNC: 22 MG/DL — SIGNIFICANT CHANGE UP (ref 7–23)
CALCIUM SERPL-MCNC: 9 MG/DL — SIGNIFICANT CHANGE UP (ref 8.4–10.5)
CHLORIDE SERPL-SCNC: 107 MMOL/L — SIGNIFICANT CHANGE UP (ref 98–107)
CO2 SERPL-SCNC: 16 MMOL/L — LOW (ref 22–31)
CREAT SERPL-MCNC: 0.69 MG/DL — SIGNIFICANT CHANGE UP (ref 0.5–1.3)
EGFR: 89 ML/MIN/1.73M2 — SIGNIFICANT CHANGE UP
EGFR: 89 ML/MIN/1.73M2 — SIGNIFICANT CHANGE UP
GLUCOSE BLDC GLUCOMTR-MCNC: 142 MG/DL — HIGH (ref 70–99)
GLUCOSE BLDC GLUCOMTR-MCNC: 154 MG/DL — HIGH (ref 70–99)
GLUCOSE BLDC GLUCOMTR-MCNC: 91 MG/DL — SIGNIFICANT CHANGE UP (ref 70–99)
GLUCOSE SERPL-MCNC: 100 MG/DL — HIGH (ref 70–99)
MAGNESIUM SERPL-MCNC: 1.9 MG/DL — SIGNIFICANT CHANGE UP (ref 1.6–2.6)
PHOSPHATE SERPL-MCNC: 2.4 MG/DL — LOW (ref 2.5–4.5)
POTASSIUM SERPL-MCNC: 5.4 MMOL/L — HIGH (ref 3.5–5.3)
POTASSIUM SERPL-SCNC: 5.4 MMOL/L — HIGH (ref 3.5–5.3)
SODIUM SERPL-SCNC: 134 MMOL/L — LOW (ref 135–145)

## 2025-08-20 PROCEDURE — 99232 SBSQ HOSP IP/OBS MODERATE 35: CPT

## 2025-08-20 RX ORDER — AMLODIPINE BESYLATE 10 MG/1
10 TABLET ORAL DAILY
Refills: 0 | Status: DISCONTINUED | OUTPATIENT
Start: 2025-08-21 | End: 2025-08-20

## 2025-08-20 RX ORDER — AMLODIPINE BESYLATE 10 MG/1
1 TABLET ORAL
Qty: 0 | Refills: 0 | DISCHARGE
Start: 2025-08-20

## 2025-08-20 RX ORDER — FUROSEMIDE 10 MG/ML
1 INJECTION INTRAMUSCULAR; INTRAVENOUS
Qty: 0 | Refills: 0 | DISCHARGE
Start: 2025-08-20

## 2025-08-20 RX ORDER — AMLODIPINE BESYLATE 10 MG/1
5 TABLET ORAL ONCE
Refills: 0 | Status: COMPLETED | OUTPATIENT
Start: 2025-08-20 | End: 2025-08-20

## 2025-08-20 RX ADMIN — Medication 50 MILLIGRAM(S): at 05:13

## 2025-08-20 RX ADMIN — LOSARTAN POTASSIUM 100 MILLIGRAM(S): 100 TABLET, FILM COATED ORAL at 05:13

## 2025-08-20 RX ADMIN — Medication 50 MILLIGRAM(S): at 13:29

## 2025-08-20 RX ADMIN — Medication 75 MILLILITER(S): at 05:20

## 2025-08-20 RX ADMIN — Medication 81 MILLIGRAM(S): at 09:05

## 2025-08-20 RX ADMIN — GABAPENTIN 200 MILLIGRAM(S): 400 CAPSULE ORAL at 17:00

## 2025-08-20 RX ADMIN — SERTRALINE 25 MILLIGRAM(S): 100 TABLET, FILM COATED ORAL at 09:05

## 2025-08-20 RX ADMIN — GABAPENTIN 200 MILLIGRAM(S): 400 CAPSULE ORAL at 05:14

## 2025-08-20 RX ADMIN — HEPARIN SODIUM 5000 UNIT(S): 1000 INJECTION INTRAVENOUS; SUBCUTANEOUS at 17:00

## 2025-08-20 RX ADMIN — INSULIN LISPRO 1: 100 INJECTION, SOLUTION INTRAVENOUS; SUBCUTANEOUS at 13:48

## 2025-08-20 RX ADMIN — Medication 1 APPLICATION(S): at 05:21

## 2025-08-20 RX ADMIN — AMLODIPINE BESYLATE 5 MILLIGRAM(S): 10 TABLET ORAL at 05:13

## 2025-08-20 RX ADMIN — AMLODIPINE BESYLATE 5 MILLIGRAM(S): 10 TABLET ORAL at 09:05

## 2025-08-20 RX ADMIN — HEPARIN SODIUM 5000 UNIT(S): 1000 INJECTION INTRAVENOUS; SUBCUTANEOUS at 05:12

## 2025-08-25 ENCOUNTER — INPATIENT (INPATIENT)
Facility: HOSPITAL | Age: 80
LOS: 2 days | Discharge: NURSING, REHAB OR LONG-TERM CARE | End: 2025-08-28
Attending: INTERNAL MEDICINE | Admitting: INTERNAL MEDICINE
Payer: MEDICARE

## 2025-08-25 VITALS
DIASTOLIC BLOOD PRESSURE: 60 MMHG | HEIGHT: 67 IN | HEART RATE: 76 BPM | RESPIRATION RATE: 18 BRPM | SYSTOLIC BLOOD PRESSURE: 169 MMHG | TEMPERATURE: 99 F | OXYGEN SATURATION: 99 %

## 2025-08-25 DIAGNOSIS — Z98.891 HISTORY OF UTERINE SCAR FROM PREVIOUS SURGERY: Chronic | ICD-10-CM

## 2025-08-25 DIAGNOSIS — R41.82 ALTERED MENTAL STATUS, UNSPECIFIED: ICD-10-CM

## 2025-08-25 DIAGNOSIS — Z90.710 ACQUIRED ABSENCE OF BOTH CERVIX AND UTERUS: Chronic | ICD-10-CM

## 2025-08-25 LAB
ALBUMIN SERPL ELPH-MCNC: 3.8 G/DL — SIGNIFICANT CHANGE UP (ref 3.3–5)
ALP SERPL-CCNC: 101 U/L — SIGNIFICANT CHANGE UP (ref 40–120)
ALT FLD-CCNC: 23 U/L — SIGNIFICANT CHANGE UP (ref 4–33)
ANION GAP SERPL CALC-SCNC: 11 MMOL/L — SIGNIFICANT CHANGE UP (ref 7–14)
APPEARANCE UR: CLEAR — SIGNIFICANT CHANGE UP
AST SERPL-CCNC: 33 U/L — HIGH (ref 4–32)
BACTERIA # UR AUTO: NEGATIVE /HPF — SIGNIFICANT CHANGE UP
BASOPHILS # BLD AUTO: 0.02 K/UL — SIGNIFICANT CHANGE UP (ref 0–0.2)
BASOPHILS NFR BLD AUTO: 0.4 % — SIGNIFICANT CHANGE UP (ref 0–2)
BILIRUB SERPL-MCNC: 0.5 MG/DL — SIGNIFICANT CHANGE UP (ref 0.2–1.2)
BILIRUB UR-MCNC: NEGATIVE — SIGNIFICANT CHANGE UP
BUN SERPL-MCNC: 33 MG/DL — HIGH (ref 7–23)
CALCIUM SERPL-MCNC: 9.5 MG/DL — SIGNIFICANT CHANGE UP (ref 8.4–10.5)
CAST: 0 /LPF — SIGNIFICANT CHANGE UP (ref 0–4)
CHLORIDE SERPL-SCNC: 107 MMOL/L — SIGNIFICANT CHANGE UP (ref 98–107)
CO2 SERPL-SCNC: 23 MMOL/L — SIGNIFICANT CHANGE UP (ref 22–31)
COLOR SPEC: YELLOW — SIGNIFICANT CHANGE UP
CREAT SERPL-MCNC: 1.06 MG/DL — SIGNIFICANT CHANGE UP (ref 0.5–1.3)
DIFF PNL FLD: NEGATIVE — SIGNIFICANT CHANGE UP
EGFR: 54 ML/MIN/1.73M2 — LOW
EGFR: 54 ML/MIN/1.73M2 — LOW
EOSINOPHIL # BLD AUTO: 0.39 K/UL — SIGNIFICANT CHANGE UP (ref 0–0.5)
EOSINOPHIL NFR BLD AUTO: 7.5 % — HIGH (ref 0–6)
FLUAV AG NPH QL: SIGNIFICANT CHANGE UP
FLUBV AG NPH QL: SIGNIFICANT CHANGE UP
GLUCOSE SERPL-MCNC: 70 MG/DL — SIGNIFICANT CHANGE UP (ref 70–99)
GLUCOSE UR QL: 250 MG/DL
HCT VFR BLD CALC: 32.5 % — LOW (ref 34.5–45)
HGB BLD-MCNC: 10.4 G/DL — LOW (ref 11.5–15.5)
IMM GRANULOCYTES # BLD AUTO: 0.01 K/UL — SIGNIFICANT CHANGE UP (ref 0–0.07)
IMM GRANULOCYTES NFR BLD AUTO: 0.2 % — SIGNIFICANT CHANGE UP (ref 0–0.9)
KETONES UR QL: NEGATIVE MG/DL — SIGNIFICANT CHANGE UP
LEUKOCYTE ESTERASE UR-ACNC: ABNORMAL
LYMPHOCYTES # BLD AUTO: 1.74 K/UL — SIGNIFICANT CHANGE UP (ref 1–3.3)
LYMPHOCYTES NFR BLD AUTO: 33.3 % — SIGNIFICANT CHANGE UP (ref 13–44)
MCHC RBC-ENTMCNC: 30.6 PG — SIGNIFICANT CHANGE UP (ref 27–34)
MCHC RBC-ENTMCNC: 32 G/DL — SIGNIFICANT CHANGE UP (ref 32–36)
MCV RBC AUTO: 95.6 FL — SIGNIFICANT CHANGE UP (ref 80–100)
MONOCYTES # BLD AUTO: 0.52 K/UL — SIGNIFICANT CHANGE UP (ref 0–0.9)
MONOCYTES NFR BLD AUTO: 9.9 % — SIGNIFICANT CHANGE UP (ref 2–14)
NEUTROPHILS # BLD AUTO: 2.55 K/UL — SIGNIFICANT CHANGE UP (ref 1.8–7.4)
NEUTROPHILS NFR BLD AUTO: 48.7 % — SIGNIFICANT CHANGE UP (ref 43–77)
NITRITE UR-MCNC: NEGATIVE — SIGNIFICANT CHANGE UP
NRBC # BLD AUTO: 0 K/UL — SIGNIFICANT CHANGE UP (ref 0–0)
NRBC # FLD: 0 K/UL — SIGNIFICANT CHANGE UP (ref 0–0)
NRBC BLD AUTO-RTO: 0 /100 WBCS — SIGNIFICANT CHANGE UP (ref 0–0)
PH UR: 6.5 — SIGNIFICANT CHANGE UP (ref 5–8)
PLATELET # BLD AUTO: 238 K/UL — SIGNIFICANT CHANGE UP (ref 150–400)
PMV BLD: 10.2 FL — SIGNIFICANT CHANGE UP (ref 7–13)
POTASSIUM SERPL-MCNC: 5.1 MMOL/L — SIGNIFICANT CHANGE UP (ref 3.5–5.3)
POTASSIUM SERPL-SCNC: 5.1 MMOL/L — SIGNIFICANT CHANGE UP (ref 3.5–5.3)
PROT SERPL-MCNC: 8.2 G/DL — SIGNIFICANT CHANGE UP (ref 6–8.3)
PROT UR-MCNC: 100 MG/DL
RBC # BLD: 3.4 M/UL — LOW (ref 3.8–5.2)
RBC # FLD: 13.2 % — SIGNIFICANT CHANGE UP (ref 10.3–14.5)
RBC CASTS # UR COMP ASSIST: 2 /HPF — SIGNIFICANT CHANGE UP (ref 0–4)
RSV RNA NPH QL NAA+NON-PROBE: SIGNIFICANT CHANGE UP
SARS-COV-2 RNA SPEC QL NAA+PROBE: SIGNIFICANT CHANGE UP
SODIUM SERPL-SCNC: 141 MMOL/L — SIGNIFICANT CHANGE UP (ref 135–145)
SOURCE RESPIRATORY: SIGNIFICANT CHANGE UP
SP GR SPEC: 1.01 — SIGNIFICANT CHANGE UP (ref 1–1.03)
SQUAMOUS # UR AUTO: 0 /HPF — SIGNIFICANT CHANGE UP (ref 0–5)
UROBILINOGEN FLD QL: 0.2 MG/DL — SIGNIFICANT CHANGE UP (ref 0.2–1)
WBC # BLD: 5.23 K/UL — SIGNIFICANT CHANGE UP (ref 3.8–10.5)
WBC # FLD AUTO: 5.23 K/UL — SIGNIFICANT CHANGE UP (ref 3.8–10.5)
WBC UR QL: 9 /HPF — HIGH (ref 0–5)

## 2025-08-25 PROCEDURE — 71046 X-RAY EXAM CHEST 2 VIEWS: CPT | Mod: 26

## 2025-08-25 PROCEDURE — 70450 CT HEAD/BRAIN W/O DYE: CPT | Mod: 26

## 2025-08-25 PROCEDURE — 99285 EMERGENCY DEPT VISIT HI MDM: CPT | Mod: FS

## 2025-08-25 RX ORDER — ACETAMINOPHEN 500 MG/5ML
1000 LIQUID (ML) ORAL ONCE
Refills: 0 | Status: COMPLETED | OUTPATIENT
Start: 2025-08-25 | End: 2025-08-25

## 2025-08-25 RX ADMIN — Medication 400 MILLIGRAM(S): at 21:24

## 2025-08-26 DIAGNOSIS — E16.2 HYPOGLYCEMIA, UNSPECIFIED: ICD-10-CM

## 2025-08-26 DIAGNOSIS — G93.40 ENCEPHALOPATHY, UNSPECIFIED: ICD-10-CM

## 2025-08-26 DIAGNOSIS — I10 ESSENTIAL (PRIMARY) HYPERTENSION: ICD-10-CM

## 2025-08-26 DIAGNOSIS — I50.30 UNSPECIFIED DIASTOLIC (CONGESTIVE) HEART FAILURE: ICD-10-CM

## 2025-08-26 DIAGNOSIS — D64.9 ANEMIA, UNSPECIFIED: ICD-10-CM

## 2025-08-26 DIAGNOSIS — Z29.9 ENCOUNTER FOR PROPHYLACTIC MEASURES, UNSPECIFIED: ICD-10-CM

## 2025-08-26 DIAGNOSIS — E11.9 TYPE 2 DIABETES MELLITUS WITHOUT COMPLICATIONS: ICD-10-CM

## 2025-08-26 LAB
ADD ON TEST-SPECIMEN IN LAB: SIGNIFICANT CHANGE UP
ADD ON TEST-SPECIMEN IN LAB: SIGNIFICANT CHANGE UP
ALBUMIN SERPL ELPH-MCNC: 3.2 G/DL — LOW (ref 3.3–5)
ALP SERPL-CCNC: 84 U/L — SIGNIFICANT CHANGE UP (ref 40–120)
ALT FLD-CCNC: 16 U/L — SIGNIFICANT CHANGE UP (ref 4–33)
AMPHET UR-MCNC: NEGATIVE — SIGNIFICANT CHANGE UP
ANION GAP SERPL CALC-SCNC: 12 MMOL/L — SIGNIFICANT CHANGE UP (ref 7–14)
AST SERPL-CCNC: 21 U/L — SIGNIFICANT CHANGE UP (ref 4–32)
BARBITURATES UR SCN-MCNC: NEGATIVE — SIGNIFICANT CHANGE UP
BENZODIAZ UR-MCNC: NEGATIVE — SIGNIFICANT CHANGE UP
BILIRUB SERPL-MCNC: 0.4 MG/DL — SIGNIFICANT CHANGE UP (ref 0.2–1.2)
BUN SERPL-MCNC: 31 MG/DL — HIGH (ref 7–23)
CALCIUM SERPL-MCNC: 8.8 MG/DL — SIGNIFICANT CHANGE UP (ref 8.4–10.5)
CHLORIDE SERPL-SCNC: 104 MMOL/L — SIGNIFICANT CHANGE UP (ref 98–107)
CHOLEST SERPL-MCNC: 111 MG/DL — SIGNIFICANT CHANGE UP
CO2 SERPL-SCNC: 21 MMOL/L — LOW (ref 22–31)
COCAINE METAB.OTHER UR-MCNC: NEGATIVE — SIGNIFICANT CHANGE UP
CREAT ?TM UR-MCNC: 33 MG/DL — SIGNIFICANT CHANGE UP
CREAT SERPL-MCNC: 1.07 MG/DL — SIGNIFICANT CHANGE UP (ref 0.5–1.3)
CREATININE URINE RESULT, DAU: 33 MG/DL — SIGNIFICANT CHANGE UP
EGFR: 53 ML/MIN/1.73M2 — LOW
EGFR: 53 ML/MIN/1.73M2 — LOW
FENTANYL UR QL SCN: NEGATIVE — SIGNIFICANT CHANGE UP
FERRITIN SERPL-MCNC: 156 NG/ML — SIGNIFICANT CHANGE UP (ref 13–330)
FOLATE SERPL-MCNC: > 20 NG/ML — SIGNIFICANT CHANGE UP (ref 3.1–17.5)
GLUCOSE BLDC GLUCOMTR-MCNC: 105 MG/DL — HIGH (ref 70–99)
GLUCOSE BLDC GLUCOMTR-MCNC: 145 MG/DL — HIGH (ref 70–99)
GLUCOSE BLDC GLUCOMTR-MCNC: 154 MG/DL — HIGH (ref 70–99)
GLUCOSE BLDC GLUCOMTR-MCNC: 189 MG/DL — HIGH (ref 70–99)
GLUCOSE BLDC GLUCOMTR-MCNC: 200 MG/DL — HIGH (ref 70–99)
GLUCOSE BLDC GLUCOMTR-MCNC: 238 MG/DL — HIGH (ref 70–99)
GLUCOSE BLDC GLUCOMTR-MCNC: 52 MG/DL — CRITICAL LOW (ref 70–99)
GLUCOSE BLDC GLUCOMTR-MCNC: 55 MG/DL — LOW (ref 70–99)
GLUCOSE BLDC GLUCOMTR-MCNC: 69 MG/DL — LOW (ref 70–99)
GLUCOSE BLDC GLUCOMTR-MCNC: 74 MG/DL — SIGNIFICANT CHANGE UP (ref 70–99)
GLUCOSE BLDC GLUCOMTR-MCNC: 98 MG/DL — SIGNIFICANT CHANGE UP (ref 70–99)
GLUCOSE SERPL-MCNC: 133 MG/DL — HIGH (ref 70–99)
HCT VFR BLD CALC: 28.6 % — LOW (ref 34.5–45)
HDLC SERPL-MCNC: 49 MG/DL — LOW
HGB BLD-MCNC: 8.9 G/DL — LOW (ref 11.5–15.5)
IRON SATN MFR SERPL: 27 % — SIGNIFICANT CHANGE UP (ref 14–50)
IRON SATN MFR SERPL: 53 UG/DL — SIGNIFICANT CHANGE UP (ref 30–160)
LDLC SERPL-MCNC: 51 MG/DL — SIGNIFICANT CHANGE UP
LIPID PNL WITH DIRECT LDL SERPL: 51 MG/DL — SIGNIFICANT CHANGE UP
MAGNESIUM SERPL-MCNC: 1.9 MG/DL — SIGNIFICANT CHANGE UP (ref 1.6–2.6)
MCHC RBC-ENTMCNC: 30.2 PG — SIGNIFICANT CHANGE UP (ref 27–34)
MCHC RBC-ENTMCNC: 31.1 G/DL — LOW (ref 32–36)
MCV RBC AUTO: 96.9 FL — SIGNIFICANT CHANGE UP (ref 80–100)
METHADONE UR-MCNC: NEGATIVE — SIGNIFICANT CHANGE UP
MRSA PCR RESULT.: SIGNIFICANT CHANGE UP
NONHDLC SERPL-MCNC: 62 MG/DL — SIGNIFICANT CHANGE UP
NRBC # BLD AUTO: 0 K/UL — SIGNIFICANT CHANGE UP (ref 0–0)
NRBC # FLD: 0 K/UL — SIGNIFICANT CHANGE UP (ref 0–0)
NRBC BLD AUTO-RTO: 0 /100 WBCS — SIGNIFICANT CHANGE UP (ref 0–0)
OPIATES UR-MCNC: NEGATIVE — SIGNIFICANT CHANGE UP
OSMOLALITY UR: 423 MOSM/KG — SIGNIFICANT CHANGE UP (ref 50–1200)
OXYCODONE UR-MCNC: NEGATIVE — SIGNIFICANT CHANGE UP
PCP SPEC-MCNC: SIGNIFICANT CHANGE UP
PCP UR-MCNC: NEGATIVE — SIGNIFICANT CHANGE UP
PHOSPHATE SERPL-MCNC: 3.8 MG/DL — SIGNIFICANT CHANGE UP (ref 2.5–4.5)
PLATELET # BLD AUTO: 199 K/UL — SIGNIFICANT CHANGE UP (ref 150–400)
PMV BLD: 10.3 FL — SIGNIFICANT CHANGE UP (ref 7–13)
POTASSIUM SERPL-MCNC: 4.2 MMOL/L — SIGNIFICANT CHANGE UP (ref 3.5–5.3)
POTASSIUM SERPL-SCNC: 4.2 MMOL/L — SIGNIFICANT CHANGE UP (ref 3.5–5.3)
POTASSIUM UR-SCNC: 23.1 MMOL/L — SIGNIFICANT CHANGE UP
PROT ?TM UR-MCNC: 90 MG/DL — SIGNIFICANT CHANGE UP
PROT SERPL-MCNC: 6.8 G/DL — SIGNIFICANT CHANGE UP (ref 6–8.3)
PROT/CREAT UR-RTO: 2.7 RATIO — HIGH (ref 0–0.2)
RBC # BLD: 2.95 M/UL — LOW (ref 3.8–5.2)
RBC # FLD: 13.2 % — SIGNIFICANT CHANGE UP (ref 10.3–14.5)
S AUREUS DNA NOSE QL NAA+PROBE: SIGNIFICANT CHANGE UP
SODIUM SERPL-SCNC: 137 MMOL/L — SIGNIFICANT CHANGE UP (ref 135–145)
SODIUM UR-SCNC: 98 MMOL/L — SIGNIFICANT CHANGE UP
T4 AB SER-ACNC: 6.24 UG/DL — SIGNIFICANT CHANGE UP (ref 5.1–13)
THC UR QL: NEGATIVE — SIGNIFICANT CHANGE UP
TIBC SERPL-MCNC: 194 UG/DL — LOW (ref 220–430)
TRIGL SERPL-MCNC: 44 MG/DL — SIGNIFICANT CHANGE UP
TSH SERPL-MCNC: 0.51 UIU/ML — SIGNIFICANT CHANGE UP (ref 0.27–4.2)
UIBC SERPL-MCNC: 141 UG/DL — SIGNIFICANT CHANGE UP (ref 110–370)
UUN UR-MCNC: 311.9 MG/DL — SIGNIFICANT CHANGE UP
VIT B12 SERPL-MCNC: 714 PG/ML — SIGNIFICANT CHANGE UP (ref 200–900)
WBC # BLD: 3.87 K/UL — SIGNIFICANT CHANGE UP (ref 3.8–10.5)
WBC # FLD AUTO: 3.87 K/UL — SIGNIFICANT CHANGE UP (ref 3.8–10.5)

## 2025-08-26 PROCEDURE — 99223 1ST HOSP IP/OBS HIGH 75: CPT

## 2025-08-26 PROCEDURE — G0316 PROLONG INPT EVAL ADD15 M: CPT

## 2025-08-26 RX ORDER — HEPARIN SODIUM 1000 [USP'U]/ML
5000 INJECTION INTRAVENOUS; SUBCUTANEOUS EVERY 12 HOURS
Refills: 0 | Status: DISCONTINUED | OUTPATIENT
Start: 2025-08-26 | End: 2025-08-28

## 2025-08-26 RX ORDER — ATORVASTATIN CALCIUM 80 MG/1
1 TABLET, FILM COATED ORAL
Refills: 0 | DISCHARGE

## 2025-08-26 RX ORDER — ACETAMINOPHEN 500 MG/5ML
2 LIQUID (ML) ORAL
Refills: 0 | DISCHARGE

## 2025-08-26 RX ORDER — MAGNESIUM, ALUMINUM HYDROXIDE 200-200 MG
30 TABLET,CHEWABLE ORAL EVERY 4 HOURS
Refills: 0 | Status: DISCONTINUED | OUTPATIENT
Start: 2025-08-26 | End: 2025-08-28

## 2025-08-26 RX ORDER — DONEPEZIL HYDROCHLORIDE 5 MG/1
10 TABLET ORAL AT BEDTIME
Refills: 0 | Status: DISCONTINUED | OUTPATIENT
Start: 2025-08-26 | End: 2025-08-28

## 2025-08-26 RX ORDER — INSULIN LISPRO 100 U/ML
INJECTION, SOLUTION INTRAVENOUS; SUBCUTANEOUS AT BEDTIME
Refills: 0 | Status: DISCONTINUED | OUTPATIENT
Start: 2025-08-26 | End: 2025-08-28

## 2025-08-26 RX ORDER — ALBUTEROL SULFATE 2.5 MG/3ML
2 VIAL, NEBULIZER (ML) INHALATION EVERY 6 HOURS
Refills: 0 | Status: DISCONTINUED | OUTPATIENT
Start: 2025-08-26 | End: 2025-08-28

## 2025-08-26 RX ORDER — MELATONIN 5 MG
6 TABLET ORAL AT BEDTIME
Refills: 0 | Status: DISCONTINUED | OUTPATIENT
Start: 2025-08-26 | End: 2025-08-28

## 2025-08-26 RX ORDER — ONDANSETRON HCL/PF 4 MG/2 ML
4 VIAL (ML) INJECTION EVERY 8 HOURS
Refills: 0 | Status: DISCONTINUED | OUTPATIENT
Start: 2025-08-26 | End: 2025-08-28

## 2025-08-26 RX ORDER — ACETAMINOPHEN 500 MG/5ML
650 LIQUID (ML) ORAL EVERY 6 HOURS
Refills: 0 | Status: DISCONTINUED | OUTPATIENT
Start: 2025-08-26 | End: 2025-08-28

## 2025-08-26 RX ORDER — LOSARTAN POTASSIUM 100 MG/1
1 TABLET, FILM COATED ORAL
Refills: 0 | DISCHARGE

## 2025-08-26 RX ORDER — LOSARTAN POTASSIUM 100 MG/1
50 TABLET, FILM COATED ORAL DAILY
Refills: 0 | Status: DISCONTINUED | OUTPATIENT
Start: 2025-08-26 | End: 2025-08-28

## 2025-08-26 RX ORDER — DONEPEZIL HYDROCHLORIDE 5 MG/1
1 TABLET ORAL
Refills: 0 | DISCHARGE

## 2025-08-26 RX ORDER — AMLODIPINE BESYLATE 10 MG/1
10 TABLET ORAL DAILY
Refills: 0 | Status: DISCONTINUED | OUTPATIENT
Start: 2025-08-26 | End: 2025-08-28

## 2025-08-26 RX ORDER — MONTELUKAST SODIUM 10 MG/1
1 TABLET ORAL
Refills: 0 | DISCHARGE

## 2025-08-26 RX ORDER — SERTRALINE 100 MG/1
25 TABLET, FILM COATED ORAL DAILY
Refills: 0 | Status: DISCONTINUED | OUTPATIENT
Start: 2025-08-26 | End: 2025-08-28

## 2025-08-26 RX ORDER — SODIUM CHLORIDE 9 G/1000ML
1000 INJECTION, SOLUTION INTRAVENOUS
Refills: 0 | Status: DISCONTINUED | OUTPATIENT
Start: 2025-08-26 | End: 2025-08-28

## 2025-08-26 RX ORDER — ASPIRIN 325 MG
1 TABLET ORAL
Refills: 0 | DISCHARGE

## 2025-08-26 RX ORDER — DEXTROSE 50 % IN WATER 50 %
15 SYRINGE (ML) INTRAVENOUS ONCE
Refills: 0 | Status: DISCONTINUED | OUTPATIENT
Start: 2025-08-26 | End: 2025-08-28

## 2025-08-26 RX ORDER — SERTRALINE 100 MG/1
1 TABLET, FILM COATED ORAL
Refills: 0 | DISCHARGE

## 2025-08-26 RX ORDER — MONTELUKAST SODIUM 10 MG/1
10 TABLET ORAL DAILY
Refills: 0 | Status: DISCONTINUED | OUTPATIENT
Start: 2025-08-26 | End: 2025-08-28

## 2025-08-26 RX ORDER — ATORVASTATIN CALCIUM 80 MG/1
40 TABLET, FILM COATED ORAL AT BEDTIME
Refills: 0 | Status: DISCONTINUED | OUTPATIENT
Start: 2025-08-26 | End: 2025-08-28

## 2025-08-26 RX ORDER — ALBUTEROL SULFATE 2.5 MG/3ML
2 VIAL, NEBULIZER (ML) INHALATION
Refills: 0 | DISCHARGE

## 2025-08-26 RX ORDER — MELATONIN 5 MG
3 TABLET ORAL AT BEDTIME
Refills: 0 | Status: DISCONTINUED | OUTPATIENT
Start: 2025-08-26 | End: 2025-08-26

## 2025-08-26 RX ORDER — DEXTROSE 50 % IN WATER 50 %
25 SYRINGE (ML) INTRAVENOUS ONCE
Refills: 0 | Status: DISCONTINUED | OUTPATIENT
Start: 2025-08-26 | End: 2025-08-28

## 2025-08-26 RX ORDER — DAPAGLIFLOZIN 5 MG/1
1 TABLET, FILM COATED ORAL
Refills: 0 | DISCHARGE

## 2025-08-26 RX ORDER — DEXTROSE 50 % IN WATER 50 %
15 SYRINGE (ML) INTRAVENOUS ONCE
Refills: 0 | Status: COMPLETED | OUTPATIENT
Start: 2025-08-26 | End: 2025-08-26

## 2025-08-26 RX ORDER — GLUCAGON 3 MG/1
1 POWDER NASAL ONCE
Refills: 0 | Status: DISCONTINUED | OUTPATIENT
Start: 2025-08-26 | End: 2025-08-28

## 2025-08-26 RX ORDER — INSULIN LISPRO 100 U/ML
INJECTION, SOLUTION INTRAVENOUS; SUBCUTANEOUS
Refills: 0 | Status: DISCONTINUED | OUTPATIENT
Start: 2025-08-26 | End: 2025-08-28

## 2025-08-26 RX ORDER — ASPIRIN 325 MG
81 TABLET ORAL DAILY
Refills: 0 | Status: DISCONTINUED | OUTPATIENT
Start: 2025-08-26 | End: 2025-08-28

## 2025-08-26 RX ORDER — DEXTROSE 50 % IN WATER 50 %
12.5 SYRINGE (ML) INTRAVENOUS ONCE
Refills: 0 | Status: DISCONTINUED | OUTPATIENT
Start: 2025-08-26 | End: 2025-08-28

## 2025-08-26 RX ADMIN — MONTELUKAST SODIUM 10 MILLIGRAM(S): 10 TABLET ORAL at 12:26

## 2025-08-26 RX ADMIN — Medication 81 MILLIGRAM(S): at 12:26

## 2025-08-26 RX ADMIN — Medication 650 MILLIGRAM(S): at 09:55

## 2025-08-26 RX ADMIN — Medication 650 MILLIGRAM(S): at 10:55

## 2025-08-26 RX ADMIN — SERTRALINE 25 MILLIGRAM(S): 100 TABLET, FILM COATED ORAL at 12:26

## 2025-08-26 RX ADMIN — LOSARTAN POTASSIUM 50 MILLIGRAM(S): 100 TABLET, FILM COATED ORAL at 15:53

## 2025-08-26 RX ADMIN — HEPARIN SODIUM 5000 UNIT(S): 1000 INJECTION INTRAVENOUS; SUBCUTANEOUS at 17:19

## 2025-08-26 RX ADMIN — Medication 1 APPLICATION(S): at 12:27

## 2025-08-26 RX ADMIN — INSULIN LISPRO 1: 100 INJECTION, SOLUTION INTRAVENOUS; SUBCUTANEOUS at 17:59

## 2025-08-26 RX ADMIN — AMLODIPINE BESYLATE 10 MILLIGRAM(S): 10 TABLET ORAL at 07:59

## 2025-08-26 RX ADMIN — ATORVASTATIN CALCIUM 40 MILLIGRAM(S): 80 TABLET, FILM COATED ORAL at 22:32

## 2025-08-26 RX ADMIN — DONEPEZIL HYDROCHLORIDE 10 MILLIGRAM(S): 5 TABLET ORAL at 22:32

## 2025-08-27 LAB
ANION GAP SERPL CALC-SCNC: 11 MMOL/L — SIGNIFICANT CHANGE UP (ref 7–14)
B BURGDOR C6 AB SER-ACNC: NEGATIVE — SIGNIFICANT CHANGE UP
B BURGDOR IGG+IGM SER-ACNC: 0.07 INDEX — SIGNIFICANT CHANGE UP (ref 0.01–0.9)
BUN SERPL-MCNC: 31 MG/DL — HIGH (ref 7–23)
CALCIUM SERPL-MCNC: 9.2 MG/DL — SIGNIFICANT CHANGE UP (ref 8.4–10.5)
CHLORIDE SERPL-SCNC: 102 MMOL/L — SIGNIFICANT CHANGE UP (ref 98–107)
CO2 SERPL-SCNC: 22 MMOL/L — SIGNIFICANT CHANGE UP (ref 22–31)
CREAT SERPL-MCNC: 0.99 MG/DL — SIGNIFICANT CHANGE UP (ref 0.5–1.3)
CULTURE RESULTS: ABNORMAL
EGFR: 58 ML/MIN/1.73M2 — LOW
EGFR: 58 ML/MIN/1.73M2 — LOW
GLUCOSE BLDC GLUCOMTR-MCNC: 151 MG/DL — HIGH (ref 70–99)
GLUCOSE BLDC GLUCOMTR-MCNC: 181 MG/DL — HIGH (ref 70–99)
GLUCOSE BLDC GLUCOMTR-MCNC: 198 MG/DL — HIGH (ref 70–99)
GLUCOSE BLDC GLUCOMTR-MCNC: 206 MG/DL — HIGH (ref 70–99)
GLUCOSE SERPL-MCNC: 162 MG/DL — HIGH (ref 70–99)
POTASSIUM SERPL-MCNC: 4.4 MMOL/L — SIGNIFICANT CHANGE UP (ref 3.5–5.3)
POTASSIUM SERPL-SCNC: 4.4 MMOL/L — SIGNIFICANT CHANGE UP (ref 3.5–5.3)
SODIUM SERPL-SCNC: 135 MMOL/L — SIGNIFICANT CHANGE UP (ref 135–145)
SPECIMEN SOURCE: SIGNIFICANT CHANGE UP
T PALLIDUM AB TITR SER: NEGATIVE — SIGNIFICANT CHANGE UP

## 2025-08-27 PROCEDURE — 99222 1ST HOSP IP/OBS MODERATE 55: CPT

## 2025-08-27 RX ADMIN — SERTRALINE 25 MILLIGRAM(S): 100 TABLET, FILM COATED ORAL at 12:40

## 2025-08-27 RX ADMIN — ATORVASTATIN CALCIUM 40 MILLIGRAM(S): 80 TABLET, FILM COATED ORAL at 21:30

## 2025-08-27 RX ADMIN — AMLODIPINE BESYLATE 10 MILLIGRAM(S): 10 TABLET ORAL at 05:53

## 2025-08-27 RX ADMIN — HEPARIN SODIUM 5000 UNIT(S): 1000 INJECTION INTRAVENOUS; SUBCUTANEOUS at 17:41

## 2025-08-27 RX ADMIN — HEPARIN SODIUM 5000 UNIT(S): 1000 INJECTION INTRAVENOUS; SUBCUTANEOUS at 10:41

## 2025-08-27 RX ADMIN — DONEPEZIL HYDROCHLORIDE 10 MILLIGRAM(S): 5 TABLET ORAL at 21:33

## 2025-08-27 RX ADMIN — MONTELUKAST SODIUM 10 MILLIGRAM(S): 10 TABLET ORAL at 12:40

## 2025-08-27 RX ADMIN — INSULIN LISPRO 1: 100 INJECTION, SOLUTION INTRAVENOUS; SUBCUTANEOUS at 17:40

## 2025-08-27 RX ADMIN — INSULIN LISPRO 1: 100 INJECTION, SOLUTION INTRAVENOUS; SUBCUTANEOUS at 08:45

## 2025-08-27 RX ADMIN — Medication 1 APPLICATION(S): at 12:43

## 2025-08-27 RX ADMIN — Medication 81 MILLIGRAM(S): at 12:44

## 2025-08-27 RX ADMIN — LOSARTAN POTASSIUM 50 MILLIGRAM(S): 100 TABLET, FILM COATED ORAL at 05:53

## 2025-08-27 RX ADMIN — INSULIN LISPRO 1: 100 INJECTION, SOLUTION INTRAVENOUS; SUBCUTANEOUS at 12:40

## 2025-08-28 ENCOUNTER — TRANSCRIPTION ENCOUNTER (OUTPATIENT)
Age: 80
End: 2025-08-28

## 2025-08-28 VITALS
RESPIRATION RATE: 18 BRPM | OXYGEN SATURATION: 99 % | TEMPERATURE: 98 F | DIASTOLIC BLOOD PRESSURE: 61 MMHG | HEART RATE: 67 BPM | SYSTOLIC BLOOD PRESSURE: 132 MMHG

## 2025-08-28 LAB
ANION GAP SERPL CALC-SCNC: 13 MMOL/L — SIGNIFICANT CHANGE UP (ref 7–14)
BUN SERPL-MCNC: 37 MG/DL — HIGH (ref 7–23)
CALCIUM SERPL-MCNC: 9.1 MG/DL — SIGNIFICANT CHANGE UP (ref 8.4–10.5)
CHLORIDE SERPL-SCNC: 101 MMOL/L — SIGNIFICANT CHANGE UP (ref 98–107)
CO2 SERPL-SCNC: 21 MMOL/L — LOW (ref 22–31)
CREAT SERPL-MCNC: 1.34 MG/DL — HIGH (ref 0.5–1.3)
EGFR: 41 ML/MIN/1.73M2 — LOW
EGFR: 41 ML/MIN/1.73M2 — LOW
GLUCOSE BLDC GLUCOMTR-MCNC: 114 MG/DL — HIGH (ref 70–99)
GLUCOSE BLDC GLUCOMTR-MCNC: 255 MG/DL — HIGH (ref 70–99)
GLUCOSE SERPL-MCNC: 124 MG/DL — HIGH (ref 70–99)
HCT VFR BLD CALC: 29.2 % — LOW (ref 34.5–45)
HGB BLD-MCNC: 9.1 G/DL — LOW (ref 11.5–15.5)
MCHC RBC-ENTMCNC: 30.3 PG — SIGNIFICANT CHANGE UP (ref 27–34)
MCHC RBC-ENTMCNC: 31.2 G/DL — LOW (ref 32–36)
MCV RBC AUTO: 97.3 FL — SIGNIFICANT CHANGE UP (ref 80–100)
NRBC # BLD AUTO: 0 K/UL — SIGNIFICANT CHANGE UP (ref 0–0)
NRBC # FLD: 0 K/UL — SIGNIFICANT CHANGE UP (ref 0–0)
NRBC BLD AUTO-RTO: 0 /100 WBCS — SIGNIFICANT CHANGE UP (ref 0–0)
PLATELET # BLD AUTO: 229 K/UL — SIGNIFICANT CHANGE UP (ref 150–400)
PMV BLD: 10.5 FL — SIGNIFICANT CHANGE UP (ref 7–13)
POTASSIUM SERPL-MCNC: 4.5 MMOL/L — SIGNIFICANT CHANGE UP (ref 3.5–5.3)
POTASSIUM SERPL-SCNC: 4.5 MMOL/L — SIGNIFICANT CHANGE UP (ref 3.5–5.3)
RBC # BLD: 3 M/UL — LOW (ref 3.8–5.2)
RBC # FLD: 13.1 % — SIGNIFICANT CHANGE UP (ref 10.3–14.5)
SODIUM SERPL-SCNC: 135 MMOL/L — SIGNIFICANT CHANGE UP (ref 135–145)
WBC # BLD: 4.36 K/UL — SIGNIFICANT CHANGE UP (ref 3.8–10.5)
WBC # FLD AUTO: 4.36 K/UL — SIGNIFICANT CHANGE UP (ref 3.8–10.5)

## 2025-08-28 RX ORDER — MELATONIN 5 MG
2 TABLET ORAL
Qty: 0 | Refills: 0 | DISCHARGE
Start: 2025-08-28

## 2025-08-28 RX ORDER — INSULIN GLARGINE-YFGN 100 [IU]/ML
5 INJECTION, SOLUTION SUBCUTANEOUS
Qty: 0 | Refills: 0 | DISCHARGE

## 2025-08-28 RX ORDER — GABAPENTIN 400 MG/1
2 CAPSULE ORAL
Refills: 0 | DISCHARGE

## 2025-08-28 RX ADMIN — INSULIN LISPRO 3: 100 INJECTION, SOLUTION INTRAVENOUS; SUBCUTANEOUS at 12:03

## 2025-08-28 RX ADMIN — Medication 1 APPLICATION(S): at 12:04

## 2025-08-28 RX ADMIN — Medication 81 MILLIGRAM(S): at 12:03

## 2025-08-28 RX ADMIN — HEPARIN SODIUM 5000 UNIT(S): 1000 INJECTION INTRAVENOUS; SUBCUTANEOUS at 05:15

## 2025-08-28 RX ADMIN — AMLODIPINE BESYLATE 10 MILLIGRAM(S): 10 TABLET ORAL at 05:30

## 2025-08-28 RX ADMIN — MONTELUKAST SODIUM 10 MILLIGRAM(S): 10 TABLET ORAL at 12:04

## 2025-08-28 RX ADMIN — SERTRALINE 25 MILLIGRAM(S): 100 TABLET, FILM COATED ORAL at 12:04

## 2025-08-28 RX ADMIN — Medication 100 MILLILITER(S): at 08:48

## 2025-08-28 RX ADMIN — LOSARTAN POTASSIUM 50 MILLIGRAM(S): 100 TABLET, FILM COATED ORAL at 05:36

## 2025-08-30 LAB — COPPER RBC-MCNC: 78.3 MCG/DL — SIGNIFICANT CHANGE UP (ref 59–91)

## 2025-09-01 LAB
CULTURE RESULTS: SIGNIFICANT CHANGE UP
CULTURE RESULTS: SIGNIFICANT CHANGE UP
SPECIMEN SOURCE: SIGNIFICANT CHANGE UP
SPECIMEN SOURCE: SIGNIFICANT CHANGE UP